# Patient Record
Sex: FEMALE | Race: WHITE | Employment: OTHER | URBAN - METROPOLITAN AREA
[De-identification: names, ages, dates, MRNs, and addresses within clinical notes are randomized per-mention and may not be internally consistent; named-entity substitution may affect disease eponyms.]

---

## 2017-01-19 ENCOUNTER — TELEPHONE (OUTPATIENT)
Dept: INTERNAL MEDICINE | Age: 67
End: 2017-01-19

## 2017-01-19 RX ORDER — AMOXICILLIN 500 MG/1
CAPSULE ORAL
Qty: 4 CAPSULE | Refills: 1 | Status: SHIPPED | OUTPATIENT
Start: 2017-01-19 | End: 2017-09-25

## 2017-01-25 RX ORDER — APIXABAN 5 MG/1
TABLET, FILM COATED ORAL
Qty: 74 TABLET | Refills: 0 | Status: SHIPPED | OUTPATIENT
Start: 2017-01-25 | End: 2017-02-25 | Stop reason: SDUPTHER

## 2017-02-20 ENCOUNTER — OFFICE VISIT (OUTPATIENT)
Dept: INTERNAL MEDICINE | Age: 67
End: 2017-02-20

## 2017-02-20 VITALS
DIASTOLIC BLOOD PRESSURE: 82 MMHG | BODY MASS INDEX: 36.41 KG/M2 | WEIGHT: 232 LBS | HEIGHT: 67 IN | SYSTOLIC BLOOD PRESSURE: 110 MMHG | HEART RATE: 84 BPM

## 2017-02-20 DIAGNOSIS — I82.4Z2 DEEP VEIN THROMBOSIS (DVT) OF DISTAL VEIN OF LEFT LOWER EXTREMITY, UNSPECIFIED CHRONICITY (HCC): Primary | ICD-10-CM

## 2017-02-20 DIAGNOSIS — E11.9 TYPE 2 DIABETES MELLITUS WITHOUT COMPLICATION, WITHOUT LONG-TERM CURRENT USE OF INSULIN (HCC): ICD-10-CM

## 2017-02-20 DIAGNOSIS — D64.9 ANEMIA, UNSPECIFIED TYPE: ICD-10-CM

## 2017-02-20 DIAGNOSIS — I10 ESSENTIAL HYPERTENSION: ICD-10-CM

## 2017-02-20 PROCEDURE — 3014F SCREEN MAMMO DOC REV: CPT | Performed by: INTERNAL MEDICINE

## 2017-02-20 PROCEDURE — G8400 PT W/DXA NO RESULTS DOC: HCPCS | Performed by: INTERNAL MEDICINE

## 2017-02-20 PROCEDURE — 3044F HG A1C LEVEL LT 7.0%: CPT | Performed by: INTERNAL MEDICINE

## 2017-02-20 PROCEDURE — 1123F ACP DISCUSS/DSCN MKR DOCD: CPT | Performed by: INTERNAL MEDICINE

## 2017-02-20 PROCEDURE — 1036F TOBACCO NON-USER: CPT | Performed by: INTERNAL MEDICINE

## 2017-02-20 PROCEDURE — 3017F COLORECTAL CA SCREEN DOC REV: CPT | Performed by: INTERNAL MEDICINE

## 2017-02-20 PROCEDURE — G8484 FLU IMMUNIZE NO ADMIN: HCPCS | Performed by: INTERNAL MEDICINE

## 2017-02-20 PROCEDURE — 1090F PRES/ABSN URINE INCON ASSESS: CPT | Performed by: INTERNAL MEDICINE

## 2017-02-20 PROCEDURE — 99214 OFFICE O/P EST MOD 30 MIN: CPT | Performed by: INTERNAL MEDICINE

## 2017-02-20 PROCEDURE — G8419 CALC BMI OUT NRM PARAM NOF/U: HCPCS | Performed by: INTERNAL MEDICINE

## 2017-02-20 PROCEDURE — G8427 DOCREV CUR MEDS BY ELIG CLIN: HCPCS | Performed by: INTERNAL MEDICINE

## 2017-02-20 PROCEDURE — 4040F PNEUMOC VAC/ADMIN/RCVD: CPT | Performed by: INTERNAL MEDICINE

## 2017-02-20 PROCEDURE — G8598 ASA/ANTIPLAT THER USED: HCPCS | Performed by: INTERNAL MEDICINE

## 2017-02-20 ASSESSMENT — PATIENT HEALTH QUESTIONNAIRE - PHQ9
SUM OF ALL RESPONSES TO PHQ9 QUESTIONS 1 & 2: 0
1. LITTLE INTEREST OR PLEASURE IN DOING THINGS: 0
2. FEELING DOWN, DEPRESSED OR HOPELESS: 0
SUM OF ALL RESPONSES TO PHQ QUESTIONS 1-9: 0

## 2017-02-20 ASSESSMENT — ENCOUNTER SYMPTOMS: RESPIRATORY NEGATIVE: 1

## 2017-02-24 ENCOUNTER — HOSPITAL ENCOUNTER (OUTPATIENT)
Dept: VASCULAR LAB | Age: 67
Discharge: HOME | End: 2017-02-24
Attending: INTERNAL MEDICINE | Admitting: INTERNAL MEDICINE

## 2017-02-24 DIAGNOSIS — I82.4Z2 ACUTE EMBOLISM AND THROMBOSIS OF DEEP VEIN OF LEFT DISTAL LOWER EXTREMITY (HCC): ICD-10-CM

## 2017-02-24 DIAGNOSIS — I82.4Z2 DEEP VEIN THROMBOSIS (DVT) OF DISTAL VEIN OF LEFT LOWER EXTREMITY, UNSPECIFIED CHRONICITY (HCC): Primary | ICD-10-CM

## 2017-02-27 RX ORDER — APIXABAN 5 MG/1
TABLET, FILM COATED ORAL
Qty: 60 TABLET | Refills: 1 | Status: SHIPPED | OUTPATIENT
Start: 2017-02-27 | End: 2017-09-25

## 2017-02-28 ENCOUNTER — TELEPHONE (OUTPATIENT)
Dept: INTERNAL MEDICINE | Age: 67
End: 2017-02-28

## 2017-03-08 RX ORDER — TRIAMTERENE AND HYDROCHLOROTHIAZIDE 75; 50 MG/1; MG/1
TABLET ORAL
Qty: 30 TABLET | Refills: 5 | Status: SHIPPED | OUTPATIENT
Start: 2017-03-08 | End: 2017-09-04 | Stop reason: SDUPTHER

## 2017-03-30 DIAGNOSIS — D64.9 ANEMIA, UNSPECIFIED TYPE: ICD-10-CM

## 2017-03-30 LAB
BASOPHILS ABSOLUTE: 0.1 K/UL (ref 0–0.2)
BASOPHILS RELATIVE PERCENT: 0.9 %
EOSINOPHILS ABSOLUTE: 0 K/UL (ref 0–0.6)
EOSINOPHILS RELATIVE PERCENT: 0.3 %
FERRITIN: 20.9 NG/ML (ref 15–150)
HCT VFR BLD CALC: 40.2 % (ref 36–48)
HEMOGLOBIN: 13 G/DL (ref 12–16)
IRON SATURATION: 64 % (ref 15–50)
IRON: 211 UG/DL (ref 37–145)
LYMPHOCYTES ABSOLUTE: 1.5 K/UL (ref 1–5.1)
LYMPHOCYTES RELATIVE PERCENT: 25.6 %
MCH RBC QN AUTO: 27.1 PG (ref 26–34)
MCHC RBC AUTO-ENTMCNC: 32.4 G/DL (ref 31–36)
MCV RBC AUTO: 83.6 FL (ref 80–100)
MONOCYTES ABSOLUTE: 0.5 K/UL (ref 0–1.3)
MONOCYTES RELATIVE PERCENT: 8.4 %
NEUTROPHILS ABSOLUTE: 3.8 K/UL (ref 1.7–7.7)
NEUTROPHILS RELATIVE PERCENT: 64.8 %
PDW BLD-RTO: 20.4 % (ref 12.4–15.4)
PLATELET # BLD: 326 K/UL (ref 135–450)
PMV BLD AUTO: 8.7 FL (ref 5–10.5)
RBC # BLD: 4.81 M/UL (ref 4–5.2)
TOTAL IRON BINDING CAPACITY: 332 UG/DL (ref 260–445)
WBC # BLD: 5.9 K/UL (ref 4–11)

## 2017-04-10 RX ORDER — VENLAFAXINE HYDROCHLORIDE 75 MG/1
CAPSULE, EXTENDED RELEASE ORAL
Qty: 30 CAPSULE | Refills: 5 | Status: SHIPPED | OUTPATIENT
Start: 2017-04-10 | End: 2017-10-11 | Stop reason: SDUPTHER

## 2017-06-12 RX ORDER — ROPINIROLE 1 MG/1
TABLET, FILM COATED ORAL
Qty: 30 TABLET | Refills: 5 | Status: SHIPPED | OUTPATIENT
Start: 2017-06-12 | End: 2017-12-16 | Stop reason: SDUPTHER

## 2017-09-05 RX ORDER — TRIAMTERENE AND HYDROCHLOROTHIAZIDE 75; 50 MG/1; MG/1
TABLET ORAL
Qty: 30 TABLET | Refills: 5 | Status: SHIPPED | OUTPATIENT
Start: 2017-09-05 | End: 2017-11-17

## 2017-09-14 RX ORDER — LEVOTHYROXINE SODIUM 0.1 MG/1
TABLET ORAL
Qty: 90 TABLET | Refills: 3 | Status: SHIPPED | OUTPATIENT
Start: 2017-09-14 | End: 2018-08-29 | Stop reason: SDUPTHER

## 2017-09-25 ENCOUNTER — OFFICE VISIT (OUTPATIENT)
Dept: INTERNAL MEDICINE | Age: 67
End: 2017-09-25

## 2017-09-25 VITALS
SYSTOLIC BLOOD PRESSURE: 118 MMHG | WEIGHT: 238 LBS | DIASTOLIC BLOOD PRESSURE: 80 MMHG | HEIGHT: 67 IN | BODY MASS INDEX: 37.35 KG/M2 | HEART RATE: 68 BPM

## 2017-09-25 DIAGNOSIS — E11.9 TYPE 2 DIABETES MELLITUS WITHOUT COMPLICATION, WITHOUT LONG-TERM CURRENT USE OF INSULIN (HCC): ICD-10-CM

## 2017-09-25 DIAGNOSIS — F32.A DEPRESSION, UNSPECIFIED DEPRESSION TYPE: ICD-10-CM

## 2017-09-25 DIAGNOSIS — I49.8 BRADYARRHYTHMIA: ICD-10-CM

## 2017-09-25 DIAGNOSIS — F32.4 DEPRESSION, MAJOR, SINGLE EPISODE, IN PARTIAL REMISSION (HCC): ICD-10-CM

## 2017-09-25 DIAGNOSIS — N28.9 RENAL INSUFFICIENCY: ICD-10-CM

## 2017-09-25 DIAGNOSIS — G47.33 OSA (OBSTRUCTIVE SLEEP APNEA): ICD-10-CM

## 2017-09-25 DIAGNOSIS — I25.10 CORONARY ARTERY DISEASE INVOLVING NATIVE CORONARY ARTERY OF NATIVE HEART WITHOUT ANGINA PECTORIS: ICD-10-CM

## 2017-09-25 DIAGNOSIS — I10 ESSENTIAL HYPERTENSION: ICD-10-CM

## 2017-09-25 DIAGNOSIS — E03.9 HYPOTHYROIDISM, UNSPECIFIED TYPE: ICD-10-CM

## 2017-09-25 DIAGNOSIS — R82.81 PYURIA: ICD-10-CM

## 2017-09-25 DIAGNOSIS — M17.11 ARTHRITIS OF KNEE, RIGHT: ICD-10-CM

## 2017-09-25 PROCEDURE — 4040F PNEUMOC VAC/ADMIN/RCVD: CPT | Performed by: INTERNAL MEDICINE

## 2017-09-25 PROCEDURE — 3014F SCREEN MAMMO DOC REV: CPT | Performed by: INTERNAL MEDICINE

## 2017-09-25 PROCEDURE — 99215 OFFICE O/P EST HI 40 MIN: CPT | Performed by: INTERNAL MEDICINE

## 2017-09-25 PROCEDURE — 3017F COLORECTAL CA SCREEN DOC REV: CPT | Performed by: INTERNAL MEDICINE

## 2017-09-25 PROCEDURE — G8419 CALC BMI OUT NRM PARAM NOF/U: HCPCS | Performed by: INTERNAL MEDICINE

## 2017-09-25 PROCEDURE — G8598 ASA/ANTIPLAT THER USED: HCPCS | Performed by: INTERNAL MEDICINE

## 2017-09-25 PROCEDURE — G8427 DOCREV CUR MEDS BY ELIG CLIN: HCPCS | Performed by: INTERNAL MEDICINE

## 2017-09-25 PROCEDURE — 1090F PRES/ABSN URINE INCON ASSESS: CPT | Performed by: INTERNAL MEDICINE

## 2017-09-25 PROCEDURE — 3046F HEMOGLOBIN A1C LEVEL >9.0%: CPT | Performed by: INTERNAL MEDICINE

## 2017-09-25 PROCEDURE — 1036F TOBACCO NON-USER: CPT | Performed by: INTERNAL MEDICINE

## 2017-09-25 PROCEDURE — 1123F ACP DISCUSS/DSCN MKR DOCD: CPT | Performed by: INTERNAL MEDICINE

## 2017-09-25 PROCEDURE — G8400 PT W/DXA NO RESULTS DOC: HCPCS | Performed by: INTERNAL MEDICINE

## 2017-09-25 RX ORDER — CEPHALEXIN 500 MG/1
500 CAPSULE ORAL 3 TIMES DAILY
Qty: 21 CAPSULE | Refills: 0 | Status: SHIPPED | OUTPATIENT
Start: 2017-09-25 | End: 2017-11-17

## 2017-09-25 ASSESSMENT — ENCOUNTER SYMPTOMS
APNEA: 1
TROUBLE SWALLOWING: 0
EYE DISCHARGE: 0
GASTROINTESTINAL NEGATIVE: 1

## 2017-10-11 RX ORDER — VENLAFAXINE HYDROCHLORIDE 75 MG/1
CAPSULE, EXTENDED RELEASE ORAL
Qty: 30 CAPSULE | Refills: 5 | Status: SHIPPED | OUTPATIENT
Start: 2017-10-11 | End: 2018-03-26 | Stop reason: SDUPTHER

## 2017-11-06 DIAGNOSIS — N28.9 RENAL INSUFFICIENCY: ICD-10-CM

## 2017-11-06 DIAGNOSIS — I10 ESSENTIAL HYPERTENSION: ICD-10-CM

## 2017-11-06 DIAGNOSIS — I49.8 BRADYARRHYTHMIA: ICD-10-CM

## 2017-11-06 LAB
A/G RATIO: 1.4 (ref 1.1–2.2)
ALBUMIN SERPL-MCNC: 4 G/DL (ref 3.4–5)
ALP BLD-CCNC: 98 U/L (ref 40–129)
ALT SERPL-CCNC: 9 U/L (ref 10–40)
ANION GAP SERPL CALCULATED.3IONS-SCNC: 16 MMOL/L (ref 3–16)
AST SERPL-CCNC: 14 U/L (ref 15–37)
BACTERIA: ABNORMAL /HPF
BASOPHILS ABSOLUTE: 0.1 K/UL (ref 0–0.2)
BASOPHILS RELATIVE PERCENT: 0.7 %
BILIRUB SERPL-MCNC: 0.4 MG/DL (ref 0–1)
BILIRUBIN URINE: ABNORMAL
BLOOD, URINE: NEGATIVE
BUN BLDV-MCNC: 25 MG/DL (ref 7–20)
CALCIUM SERPL-MCNC: 9.8 MG/DL (ref 8.3–10.6)
CHLORIDE BLD-SCNC: 96 MMOL/L (ref 99–110)
CLARITY: ABNORMAL
CO2: 26 MMOL/L (ref 21–32)
COLOR: ABNORMAL
CREAT SERPL-MCNC: 1.1 MG/DL (ref 0.6–1.2)
EOSINOPHILS ABSOLUTE: 0.1 K/UL (ref 0–0.6)
EOSINOPHILS RELATIVE PERCENT: 1.1 %
EPITHELIAL CELLS, UA: 7 /HPF (ref 0–5)
GFR AFRICAN AMERICAN: 60
GFR NON-AFRICAN AMERICAN: 50
GLOBULIN: 2.8 G/DL
GLUCOSE BLD-MCNC: 116 MG/DL (ref 70–99)
GLUCOSE URINE: NEGATIVE MG/DL
HCT VFR BLD CALC: 41.2 % (ref 36–48)
HEMOGLOBIN: 13.3 G/DL (ref 12–16)
HYALINE CASTS: 5 /LPF (ref 0–8)
KETONES, URINE: NEGATIVE MG/DL
LEUKOCYTE ESTERASE, URINE: ABNORMAL
LYMPHOCYTES ABSOLUTE: 1.3 K/UL (ref 1–5.1)
LYMPHOCYTES RELATIVE PERCENT: 14.5 %
MCH RBC QN AUTO: 28.8 PG (ref 26–34)
MCHC RBC AUTO-ENTMCNC: 32.3 G/DL (ref 31–36)
MCV RBC AUTO: 89.3 FL (ref 80–100)
MICROSCOPIC EXAMINATION: YES
MONOCYTES ABSOLUTE: 0.7 K/UL (ref 0–1.3)
MONOCYTES RELATIVE PERCENT: 7.5 %
NEUTROPHILS ABSOLUTE: 6.9 K/UL (ref 1.7–7.7)
NEUTROPHILS RELATIVE PERCENT: 76.2 %
NITRITE, URINE: NEGATIVE
PDW BLD-RTO: 15.6 % (ref 12.4–15.4)
PH UA: 7
PLATELET # BLD: 473 K/UL (ref 135–450)
PMV BLD AUTO: 8.4 FL (ref 5–10.5)
POTASSIUM SERPL-SCNC: 3.8 MMOL/L (ref 3.5–5.1)
PROTEIN UA: NEGATIVE MG/DL
RBC # BLD: 4.61 M/UL (ref 4–5.2)
RBC UA: ABNORMAL /HPF (ref 0–2)
SODIUM BLD-SCNC: 138 MMOL/L (ref 136–145)
SPECIFIC GRAVITY UA: 1.02
TOTAL PROTEIN: 6.8 G/DL (ref 6.4–8.2)
TSH REFLEX FT4: 3.38 UIU/ML (ref 0.27–4.2)
URINE REFLEX TO CULTURE: YES
URINE TYPE: ABNORMAL
UROBILINOGEN, URINE: 1 E.U./DL
WBC # BLD: 9.1 K/UL (ref 4–11)
WBC UA: 3 /HPF (ref 0–5)

## 2017-11-08 LAB — URINE CULTURE, ROUTINE: NORMAL

## 2017-11-17 ENCOUNTER — OFFICE VISIT (OUTPATIENT)
Dept: INTERNAL MEDICINE | Age: 67
End: 2017-11-17

## 2017-11-17 VITALS
SYSTOLIC BLOOD PRESSURE: 92 MMHG | DIASTOLIC BLOOD PRESSURE: 72 MMHG | HEART RATE: 68 BPM | HEIGHT: 67 IN | BODY MASS INDEX: 36.1 KG/M2 | WEIGHT: 230 LBS

## 2017-11-17 DIAGNOSIS — R82.81 PYURIA: ICD-10-CM

## 2017-11-17 DIAGNOSIS — E11.9 TYPE 2 DIABETES MELLITUS WITHOUT COMPLICATION, WITHOUT LONG-TERM CURRENT USE OF INSULIN (HCC): ICD-10-CM

## 2017-11-17 DIAGNOSIS — N28.9 RENAL INSUFFICIENCY: ICD-10-CM

## 2017-11-17 DIAGNOSIS — G47.33 OSA (OBSTRUCTIVE SLEEP APNEA): ICD-10-CM

## 2017-11-17 DIAGNOSIS — I95.1 ORTHOSTATIC HYPOTENSION: ICD-10-CM

## 2017-11-17 DIAGNOSIS — E03.9 HYPOTHYROIDISM, UNSPECIFIED TYPE: ICD-10-CM

## 2017-11-17 DIAGNOSIS — I25.10 CORONARY ARTERY DISEASE INVOLVING NATIVE CORONARY ARTERY OF NATIVE HEART WITHOUT ANGINA PECTORIS: ICD-10-CM

## 2017-11-17 DIAGNOSIS — I10 ESSENTIAL HYPERTENSION: ICD-10-CM

## 2017-11-17 DIAGNOSIS — D64.9 ANEMIA, UNSPECIFIED TYPE: ICD-10-CM

## 2017-11-17 DIAGNOSIS — M17.11 ARTHRITIS OF KNEE, RIGHT: ICD-10-CM

## 2017-11-17 PROCEDURE — 99215 OFFICE O/P EST HI 40 MIN: CPT | Performed by: INTERNAL MEDICINE

## 2017-11-17 PROCEDURE — G8400 PT W/DXA NO RESULTS DOC: HCPCS | Performed by: INTERNAL MEDICINE

## 2017-11-17 PROCEDURE — 3017F COLORECTAL CA SCREEN DOC REV: CPT | Performed by: INTERNAL MEDICINE

## 2017-11-17 PROCEDURE — G8427 DOCREV CUR MEDS BY ELIG CLIN: HCPCS | Performed by: INTERNAL MEDICINE

## 2017-11-17 PROCEDURE — 4040F PNEUMOC VAC/ADMIN/RCVD: CPT | Performed by: INTERNAL MEDICINE

## 2017-11-17 PROCEDURE — 1090F PRES/ABSN URINE INCON ASSESS: CPT | Performed by: INTERNAL MEDICINE

## 2017-11-17 PROCEDURE — G8598 ASA/ANTIPLAT THER USED: HCPCS | Performed by: INTERNAL MEDICINE

## 2017-11-17 PROCEDURE — G8484 FLU IMMUNIZE NO ADMIN: HCPCS | Performed by: INTERNAL MEDICINE

## 2017-11-17 PROCEDURE — 1036F TOBACCO NON-USER: CPT | Performed by: INTERNAL MEDICINE

## 2017-11-17 PROCEDURE — 1123F ACP DISCUSS/DSCN MKR DOCD: CPT | Performed by: INTERNAL MEDICINE

## 2017-11-17 PROCEDURE — 3014F SCREEN MAMMO DOC REV: CPT | Performed by: INTERNAL MEDICINE

## 2017-11-17 PROCEDURE — 3046F HEMOGLOBIN A1C LEVEL >9.0%: CPT | Performed by: INTERNAL MEDICINE

## 2017-11-17 PROCEDURE — G8417 CALC BMI ABV UP PARAM F/U: HCPCS | Performed by: INTERNAL MEDICINE

## 2017-11-17 ASSESSMENT — ENCOUNTER SYMPTOMS
TROUBLE SWALLOWING: 0
CHEST TIGHTNESS: 0
GASTROINTESTINAL NEGATIVE: 1
SHORTNESS OF BREATH: 0
ALLERGIC/IMMUNOLOGIC NEGATIVE: 1

## 2017-11-17 NOTE — ASSESSMENT & PLAN NOTE
Patient's daughter is here to help give history. Patient has lightheadedness with position change and with bending over. Has 36 mmHg drop today with orthostatic changes. We'll stop Maxide in follow-up in 2 months after labs.

## 2017-11-17 NOTE — PROGRESS NOTES
place, and time. She has normal strength. She displays no tremor. No cranial nerve deficit. Coordination and gait normal.   No focal signs   Skin: She is not diaphoretic. No pallor. Psychiatric: She has a normal mood and affect. Her behavior is normal. Judgment and thought content normal.       ASSESSMENT:       Encounter Diagnoses   Name Primary?  Hypothyroidism, unspecified type     Essential hypertension     Type 2 diabetes mellitus without complication, without long-term current use of insulin (HCC)     Orthostatic hypotension     Renal insufficiency     Coronary artery disease involving native coronary artery of native heart without angina pectoris     Arthritis of knee, right     Pyuria     Anemia, unspecified type     ADILENE (obstructive sleep apnea)        Orthostatic hypotension  Patient's daughter is here to help give history. Patient has lightheadedness with position change and with bending over. Has 36 mmHg drop today with orthostatic changes. We'll stop Maxide in follow-up in 2 months after labs. Renal insufficiency  Creatinine 1.1 with GFR 50. Stopping Maxide and recheck in 2 months. Encourage hydration. No NSAID use. Hypothyroidism  Recheck TFT with next labs    Hypertension  See orthostatic hypotension. Stopping Maxide. Type 2 diabetes mellitus without complication (HCC)  No Rx. Sugars have been okay. A1c with next labs. CAD (coronary artery disease)  Nonobstructive. No chest pain or other cardiac symptoms except orthostatic hypotension. Arthritis of knee, right  Status post total knee replacement approximate 6 weeks ago and she is doing great. Pyuria  Culture-negative. No treatment. Anemia  Hemoglobin 13.3 stable.     ADILENE (obstructive sleep apnea)  CPAP        PLAN:  See ASSESSMENT for evaluation & PLAN    Orders Placed This Encounter   Procedures    Comprehensive Metabolic Panel     Standing Status:   Future     Standing Expiration Date:   11/17/2018    TSH without Reflex     Standing Status:   Future     Standing Expiration Date:   11/17/2018    T4, Free     Standing Status:   Future     Standing Expiration Date:   11/17/2018    Hemoglobin A1C     Standing Status:   Future     Standing Expiration Date:   11/17/2018       PSH, PMH, SH and FH reviewed and noted. Recent and past labs, tests and consults also reviewed. Recent or new meds also reviewed. Lengthy and thorough review of patient's current problem and other potential comorbid issues affecting patient - discussion w/ patient and accompanying family members. Questions and concerns were addressed, as well as any potential treatment options.

## 2017-12-18 RX ORDER — ROPINIROLE 1 MG/1
TABLET, FILM COATED ORAL
Qty: 30 TABLET | Refills: 5 | Status: SHIPPED | OUTPATIENT
Start: 2017-12-18 | End: 2018-06-16 | Stop reason: SDUPTHER

## 2018-01-25 DIAGNOSIS — E11.9 TYPE 2 DIABETES MELLITUS WITHOUT COMPLICATION, WITHOUT LONG-TERM CURRENT USE OF INSULIN (HCC): ICD-10-CM

## 2018-01-25 DIAGNOSIS — E03.9 HYPOTHYROIDISM, UNSPECIFIED TYPE: ICD-10-CM

## 2018-01-25 DIAGNOSIS — I10 ESSENTIAL HYPERTENSION: ICD-10-CM

## 2018-01-25 LAB
A/G RATIO: 1.6 (ref 1.1–2.2)
ALBUMIN SERPL-MCNC: 3.9 G/DL (ref 3.4–5)
ALP BLD-CCNC: 81 U/L (ref 40–129)
ALT SERPL-CCNC: 8 U/L (ref 10–40)
ANION GAP SERPL CALCULATED.3IONS-SCNC: 13 MMOL/L (ref 3–16)
AST SERPL-CCNC: 13 U/L (ref 15–37)
BILIRUB SERPL-MCNC: 0.4 MG/DL (ref 0–1)
BUN BLDV-MCNC: 15 MG/DL (ref 7–20)
CALCIUM SERPL-MCNC: 9.5 MG/DL (ref 8.3–10.6)
CHLORIDE BLD-SCNC: 99 MMOL/L (ref 99–110)
CO2: 28 MMOL/L (ref 21–32)
CREAT SERPL-MCNC: 0.9 MG/DL (ref 0.6–1.2)
GFR AFRICAN AMERICAN: >60
GFR NON-AFRICAN AMERICAN: >60
GLOBULIN: 2.4 G/DL
GLUCOSE BLD-MCNC: 113 MG/DL (ref 70–99)
POTASSIUM SERPL-SCNC: 4.3 MMOL/L (ref 3.5–5.1)
SODIUM BLD-SCNC: 140 MMOL/L (ref 136–145)
T4 FREE: 1.6 NG/DL (ref 0.9–1.8)
TOTAL PROTEIN: 6.3 G/DL (ref 6.4–8.2)
TSH SERPL DL<=0.05 MIU/L-ACNC: 2.1 UIU/ML (ref 0.27–4.2)

## 2018-01-26 LAB
ESTIMATED AVERAGE GLUCOSE: 131.2 MG/DL
HBA1C MFR BLD: 6.2 %

## 2018-01-29 ENCOUNTER — OFFICE VISIT (OUTPATIENT)
Dept: INTERNAL MEDICINE | Age: 68
End: 2018-01-29

## 2018-01-29 VITALS
OXYGEN SATURATION: 98 % | HEIGHT: 67 IN | SYSTOLIC BLOOD PRESSURE: 138 MMHG | DIASTOLIC BLOOD PRESSURE: 88 MMHG | BODY MASS INDEX: 38.3 KG/M2 | WEIGHT: 244 LBS | HEART RATE: 68 BPM

## 2018-01-29 DIAGNOSIS — G47.33 OSA (OBSTRUCTIVE SLEEP APNEA): ICD-10-CM

## 2018-01-29 DIAGNOSIS — F32.A DEPRESSION, UNSPECIFIED DEPRESSION TYPE: ICD-10-CM

## 2018-01-29 DIAGNOSIS — I95.1 ORTHOSTATIC HYPOTENSION: ICD-10-CM

## 2018-01-29 DIAGNOSIS — N28.9 RENAL INSUFFICIENCY: ICD-10-CM

## 2018-01-29 DIAGNOSIS — I10 ESSENTIAL HYPERTENSION: ICD-10-CM

## 2018-01-29 DIAGNOSIS — E11.9 TYPE 2 DIABETES MELLITUS WITHOUT COMPLICATION, WITHOUT LONG-TERM CURRENT USE OF INSULIN (HCC): ICD-10-CM

## 2018-01-29 DIAGNOSIS — E03.9 HYPOTHYROIDISM, UNSPECIFIED TYPE: ICD-10-CM

## 2018-01-29 DIAGNOSIS — G25.81 RESTLESS LEG SYNDROME: ICD-10-CM

## 2018-01-29 DIAGNOSIS — F32.5 MAJOR DEPRESSION, SINGLE EPISODE, IN COMPLETE REMISSION (HCC): ICD-10-CM

## 2018-01-29 DIAGNOSIS — I25.10 CORONARY ARTERY DISEASE INVOLVING NATIVE CORONARY ARTERY OF NATIVE HEART WITHOUT ANGINA PECTORIS: ICD-10-CM

## 2018-01-29 DIAGNOSIS — Z00.00 PREVENTATIVE HEALTH CARE: ICD-10-CM

## 2018-01-29 DIAGNOSIS — E04.1 THYROID NODULE: ICD-10-CM

## 2018-01-29 PROBLEM — M17.11 ARTHRITIS OF KNEE, RIGHT: Status: RESOLVED | Noted: 2017-09-25 | Resolved: 2018-01-29

## 2018-01-29 PROCEDURE — G8599 NO ASA/ANTIPLAT THER USE RNG: HCPCS | Performed by: INTERNAL MEDICINE

## 2018-01-29 PROCEDURE — 3044F HG A1C LEVEL LT 7.0%: CPT | Performed by: INTERNAL MEDICINE

## 2018-01-29 PROCEDURE — G0439 PPPS, SUBSEQ VISIT: HCPCS | Performed by: INTERNAL MEDICINE

## 2018-01-29 ASSESSMENT — ENCOUNTER SYMPTOMS
ALLERGIC/IMMUNOLOGIC NEGATIVE: 1
GASTROINTESTINAL NEGATIVE: 1
TROUBLE SWALLOWING: 0
RESPIRATORY NEGATIVE: 1

## 2018-01-29 NOTE — PROGRESS NOTES
complication (HCC)  P0N stable 6.2. No Rx. Thyroid nodule  Possible small nodule on the right. Get ultrasound. Restless leg syndrome  Controlled    Renal insufficiency  GFR now greater than 60. Off diuretic. Orthostatic hypotension  Resolved off hydrochlorothiazide. Hypertension  Off hydrochlorothiazide, she feels better and no orthostatic symptoms. BP slightly elevated todayshe is having her dog euthanized today. She will monitor BP follow-up in 6 months. Depression  Clinically doing wellcontinue Effexor. CAD (coronary artery disease)  No angina symptoms. ADILENE (obstructive sleep apnea)  CPAP        PLAN:  See ASSESSMENT for evaluation & PLAN    Orders Placed This Encounter   Procedures    US Thyroid     Standing Status:   Future     Standing Expiration Date:   1/29/2019     Order Specific Question:   Reason for exam:     Answer:   possible small right nodule noted on exam    Comprehensive Metabolic Panel     Standing Status:   Future     Standing Expiration Date:   1/29/2019    Lipid Panel     Standing Status:   Future     Standing Expiration Date:   1/29/2019     Order Specific Question:   Is Patient Fasting?/# of Hours     Answer:   yes - 8 hours    Hemoglobin A1C     Standing Status:   Future     Standing Expiration Date:   1/29/2019    TSH without Reflex     Standing Status:   Future     Standing Expiration Date:   1/29/2019    T4, Free     Standing Status:   Future     Standing Expiration Date:   1/29/2019       PSH, PMH, SH and FH reviewed and noted. Recent and past labs, tests and consults also reviewed. Recent or new meds also reviewed.

## 2018-02-05 ENCOUNTER — HOSPITAL ENCOUNTER (OUTPATIENT)
Dept: ULTRASOUND IMAGING | Age: 68
Discharge: HOME | End: 2018-02-05
Attending: INTERNAL MEDICINE | Admitting: INTERNAL MEDICINE

## 2018-02-05 DIAGNOSIS — E04.1 NONTOXIC SINGLE THYROID NODULE: ICD-10-CM

## 2018-02-05 DIAGNOSIS — E04.1 THYROID NODULE: ICD-10-CM

## 2018-03-26 RX ORDER — VENLAFAXINE HYDROCHLORIDE 75 MG/1
CAPSULE, EXTENDED RELEASE ORAL
Qty: 30 CAPSULE | Refills: 5 | Status: SHIPPED | OUTPATIENT
Start: 2018-03-26 | End: 2018-09-15 | Stop reason: SDUPTHER

## 2018-04-11 PROBLEM — Z00.00 PREVENTATIVE HEALTH CARE: Status: RESOLVED | Noted: 2018-01-29 | Resolved: 2018-04-11

## 2018-06-18 RX ORDER — ROPINIROLE 1 MG/1
TABLET, FILM COATED ORAL
Qty: 30 TABLET | Refills: 5 | Status: SHIPPED | OUTPATIENT
Start: 2018-06-18 | End: 2018-12-15 | Stop reason: SDUPTHER

## 2018-07-31 ENCOUNTER — TELEPHONE (OUTPATIENT)
Dept: INTERNAL MEDICINE | Age: 68
End: 2018-07-31

## 2018-08-29 RX ORDER — LEVOTHYROXINE SODIUM 0.1 MG/1
TABLET ORAL
Qty: 90 TABLET | Refills: 3 | Status: SHIPPED | OUTPATIENT
Start: 2018-08-29 | End: 2019-08-12 | Stop reason: SDUPTHER

## 2018-09-14 DIAGNOSIS — E03.9 HYPOTHYROIDISM, UNSPECIFIED TYPE: ICD-10-CM

## 2018-09-14 DIAGNOSIS — E11.9 TYPE 2 DIABETES MELLITUS WITHOUT COMPLICATION, WITHOUT LONG-TERM CURRENT USE OF INSULIN (HCC): ICD-10-CM

## 2018-09-14 DIAGNOSIS — I10 ESSENTIAL HYPERTENSION: ICD-10-CM

## 2018-09-14 LAB
A/G RATIO: 1.5 (ref 1.1–2.2)
ALBUMIN SERPL-MCNC: 4.1 G/DL (ref 3.4–5)
ALP BLD-CCNC: 92 U/L (ref 40–129)
ALT SERPL-CCNC: 15 U/L (ref 10–40)
ANION GAP SERPL CALCULATED.3IONS-SCNC: 16 MMOL/L (ref 3–16)
AST SERPL-CCNC: 20 U/L (ref 15–37)
BILIRUB SERPL-MCNC: 0.4 MG/DL (ref 0–1)
BUN BLDV-MCNC: 23 MG/DL (ref 7–20)
CALCIUM SERPL-MCNC: 9.7 MG/DL (ref 8.3–10.6)
CHLORIDE BLD-SCNC: 95 MMOL/L (ref 99–110)
CHOLESTEROL, TOTAL: 246 MG/DL (ref 0–199)
CO2: 29 MMOL/L (ref 21–32)
CREAT SERPL-MCNC: 1 MG/DL (ref 0.6–1.2)
GFR AFRICAN AMERICAN: >60
GFR NON-AFRICAN AMERICAN: 55
GLOBULIN: 2.7 G/DL
GLUCOSE BLD-MCNC: 137 MG/DL (ref 70–99)
HDLC SERPL-MCNC: 44 MG/DL (ref 40–60)
LDL CHOLESTEROL CALCULATED: 144 MG/DL
POTASSIUM SERPL-SCNC: 4.1 MMOL/L (ref 3.5–5.1)
SODIUM BLD-SCNC: 140 MMOL/L (ref 136–145)
T4 FREE: 1.8 NG/DL (ref 0.9–1.8)
TOTAL PROTEIN: 6.8 G/DL (ref 6.4–8.2)
TRIGL SERPL-MCNC: 292 MG/DL (ref 0–150)
TSH SERPL DL<=0.05 MIU/L-ACNC: 0.84 UIU/ML (ref 0.27–4.2)
VLDLC SERPL CALC-MCNC: 58 MG/DL

## 2018-09-15 LAB
ESTIMATED AVERAGE GLUCOSE: 157.1 MG/DL
HBA1C MFR BLD: 7.1 %

## 2018-09-15 RX ORDER — VENLAFAXINE HYDROCHLORIDE 75 MG/1
CAPSULE, EXTENDED RELEASE ORAL
Qty: 30 CAPSULE | Refills: 5 | Status: SHIPPED | OUTPATIENT
Start: 2018-09-15 | End: 2019-08-16

## 2018-10-22 ENCOUNTER — TELEPHONE (OUTPATIENT)
Dept: INTERNAL MEDICINE CLINIC | Age: 68
End: 2018-10-22

## 2018-10-22 RX ORDER — ALBUTEROL SULFATE 90 UG/1
2 AEROSOL, METERED RESPIRATORY (INHALATION) EVERY 4 HOURS PRN
Qty: 1 INHALER | Refills: 0 | Status: SHIPPED | OUTPATIENT
Start: 2018-10-22 | End: 2019-09-17

## 2018-10-22 NOTE — TELEPHONE ENCOUNTER
Patient has appt 10/26 but has URI with iwheezing, coughing. Patient has a hx of asthma and requesting to be put on Dr. Sheri Orta schedule before then. Patient doesn't have an inhaler so she isn't sleeping due to coughing.

## 2018-10-26 ENCOUNTER — OFFICE VISIT (OUTPATIENT)
Dept: INTERNAL MEDICINE CLINIC | Age: 68
End: 2018-10-26
Payer: MEDICARE

## 2018-10-26 VITALS
DIASTOLIC BLOOD PRESSURE: 68 MMHG | SYSTOLIC BLOOD PRESSURE: 128 MMHG | HEIGHT: 67 IN | BODY MASS INDEX: 41.12 KG/M2 | WEIGHT: 262 LBS

## 2018-10-26 DIAGNOSIS — E11.9 TYPE 2 DIABETES MELLITUS WITHOUT COMPLICATION, WITHOUT LONG-TERM CURRENT USE OF INSULIN (HCC): ICD-10-CM

## 2018-10-26 DIAGNOSIS — E66.01 MORBID OBESITY WITH BMI OF 40.0-44.9, ADULT (HCC): ICD-10-CM

## 2018-10-26 DIAGNOSIS — E04.1 THYROID NODULE: ICD-10-CM

## 2018-10-26 DIAGNOSIS — I10 ESSENTIAL HYPERTENSION: ICD-10-CM

## 2018-10-26 DIAGNOSIS — E03.9 HYPOTHYROIDISM, UNSPECIFIED TYPE: ICD-10-CM

## 2018-10-26 DIAGNOSIS — E78.2 HYPERLIPIDEMIA, MIXED: ICD-10-CM

## 2018-10-26 DIAGNOSIS — D64.9 ANEMIA, UNSPECIFIED TYPE: Primary | ICD-10-CM

## 2018-10-26 DIAGNOSIS — N28.9 RENAL INSUFFICIENCY: ICD-10-CM

## 2018-10-26 DIAGNOSIS — J45.909 ACUTE ASTHMATIC BRONCHITIS: ICD-10-CM

## 2018-10-26 PROBLEM — I95.1 ORTHOSTATIC HYPOTENSION: Status: RESOLVED | Noted: 2017-11-17 | Resolved: 2018-10-26

## 2018-10-26 PROCEDURE — 1090F PRES/ABSN URINE INCON ASSESS: CPT | Performed by: INTERNAL MEDICINE

## 2018-10-26 PROCEDURE — G8599 NO ASA/ANTIPLAT THER USE RNG: HCPCS | Performed by: INTERNAL MEDICINE

## 2018-10-26 PROCEDURE — 3017F COLORECTAL CA SCREEN DOC REV: CPT | Performed by: INTERNAL MEDICINE

## 2018-10-26 PROCEDURE — G8417 CALC BMI ABV UP PARAM F/U: HCPCS | Performed by: INTERNAL MEDICINE

## 2018-10-26 PROCEDURE — G8400 PT W/DXA NO RESULTS DOC: HCPCS | Performed by: INTERNAL MEDICINE

## 2018-10-26 PROCEDURE — 1036F TOBACCO NON-USER: CPT | Performed by: INTERNAL MEDICINE

## 2018-10-26 PROCEDURE — 3288F FALL RISK ASSESSMENT DOCD: CPT | Performed by: INTERNAL MEDICINE

## 2018-10-26 PROCEDURE — 1101F PT FALLS ASSESS-DOCD LE1/YR: CPT | Performed by: INTERNAL MEDICINE

## 2018-10-26 PROCEDURE — G8484 FLU IMMUNIZE NO ADMIN: HCPCS | Performed by: INTERNAL MEDICINE

## 2018-10-26 PROCEDURE — 2022F DILAT RTA XM EVC RTNOPTHY: CPT | Performed by: INTERNAL MEDICINE

## 2018-10-26 PROCEDURE — 1123F ACP DISCUSS/DSCN MKR DOCD: CPT | Performed by: INTERNAL MEDICINE

## 2018-10-26 PROCEDURE — 3045F PR MOST RECENT HEMOGLOBIN A1C LEVEL 7.0-9.0%: CPT | Performed by: INTERNAL MEDICINE

## 2018-10-26 PROCEDURE — G8427 DOCREV CUR MEDS BY ELIG CLIN: HCPCS | Performed by: INTERNAL MEDICINE

## 2018-10-26 PROCEDURE — 4040F PNEUMOC VAC/ADMIN/RCVD: CPT | Performed by: INTERNAL MEDICINE

## 2018-10-26 PROCEDURE — 99214 OFFICE O/P EST MOD 30 MIN: CPT | Performed by: INTERNAL MEDICINE

## 2018-10-26 RX ORDER — ATORVASTATIN CALCIUM 10 MG/1
10 TABLET, FILM COATED ORAL DAILY
Qty: 90 TABLET | Refills: 3 | Status: SHIPPED | OUTPATIENT
Start: 2018-10-26 | End: 2019-05-24 | Stop reason: SDUPTHER

## 2018-10-26 RX ORDER — AZITHROMYCIN 500 MG/1
TABLET, FILM COATED ORAL
Qty: 3 TABLET | Refills: 0 | Status: SHIPPED | OUTPATIENT
Start: 2018-10-26 | End: 2018-11-05

## 2018-10-26 RX ORDER — METFORMIN HYDROCHLORIDE 500 MG/1
500 TABLET, EXTENDED RELEASE ORAL DAILY
Qty: 90 TABLET | Refills: 3 | Status: SHIPPED | OUTPATIENT
Start: 2018-10-26 | End: 2019-05-24 | Stop reason: SDUPTHER

## 2018-10-26 ASSESSMENT — ENCOUNTER SYMPTOMS
RHINORRHEA: 1
BACK PAIN: 1
WHEEZING: 1
SORE THROAT: 0
SHORTNESS OF BREATH: 0
COUGH: 1
GASTROINTESTINAL NEGATIVE: 1

## 2018-10-26 NOTE — PROGRESS NOTES
SUBJECTIVE:  Patient ID: Karla Zamora is an 79 y.o. female. HPI: Patient here today for the f/u of chronic problems-- see Problem List and associated comments. New issues or complaints include (alsosee Assessment for more details): The labs reviewed. Patient's sugar and weight are both increased. Her cholesterols gone up as well. We discussed her diet and she does have a poor diet with usually prepared dinners. She is also having approximately 1-2 weeks of respiratory symptoms with cough and wheezing. Her sister is also sick with the same thing. Denies any cardiovascular symptoms. Nonsmoker. Review of Systems   Constitutional: Positive for unexpected weight change. Negative for chills and fever. HENT: Positive for rhinorrhea. Negative for sore throat. Respiratory: Positive for cough and wheezing. Negative for shortness of breath. Cardiovascular: Negative for chest pain. Gastrointestinal: Negative. Genitourinary: Negative. Musculoskeletal: Positive for arthralgias and back pain. Neurological: Negative. Psychiatric/Behavioral: Negative. OBJECTIVE:    /68 (Site: Right Upper Arm)   Ht 5' 7\" (1.702 m)   Wt 262 lb (118.8 kg)   BMI 41.04 kg/m²      Physical Exam   Constitutional: She is oriented to person, place, and time. She appears well-developed and well-nourished. No distress. Overweight   HENT:   Head: Normocephalic and atraumatic. Mouth/Throat: No oropharyngeal exudate. Eyes: Right eye exhibits no discharge. Left eye exhibits no discharge. No scleral icterus. Neck: No thyromegaly present. Cardiovascular: Normal rate, regular rhythm and normal heart sounds. Pulmonary/Chest: Effort normal. No stridor. No respiratory distress. She has wheezes. She has no rales. Semi-productive cough   Abdominal: Soft. Bowel sounds are normal.   Musculoskeletal: She exhibits no edema. Lymphadenopathy:     She has no cervical adenopathy.    Neurological: She is

## 2018-10-29 PROBLEM — M47.817 LUMBOSACRAL SPONDYLOSIS WITHOUT MYELOPATHY: Chronic | Status: ACTIVE | Noted: 2018-10-29

## 2018-10-29 PROBLEM — M51.379 DEGENERATION OF LUMBAR OR LUMBOSACRAL INTERVERTEBRAL DISC: Status: ACTIVE | Noted: 2018-10-29

## 2018-10-29 PROBLEM — M51.37 DEGENERATION OF LUMBAR OR LUMBOSACRAL INTERVERTEBRAL DISC: Chronic | Status: ACTIVE | Noted: 2018-10-29

## 2018-10-29 PROBLEM — M51.26 DISPLACEMENT OF LUMBAR INTERVERTEBRAL DISC WITHOUT MYELOPATHY: Chronic | Status: ACTIVE | Noted: 2018-10-29

## 2018-10-29 PROBLEM — M51.37 DEGENERATION OF LUMBAR OR LUMBOSACRAL INTERVERTEBRAL DISC: Status: ACTIVE | Noted: 2018-10-29

## 2018-10-29 PROBLEM — M51.26 DISPLACEMENT OF LUMBAR INTERVERTEBRAL DISC WITHOUT MYELOPATHY: Status: ACTIVE | Noted: 2018-10-29

## 2018-10-29 PROBLEM — M48.061 SPINAL STENOSIS, LUMBAR REGION, WITHOUT NEUROGENIC CLAUDICATION: Status: ACTIVE | Noted: 2018-10-29

## 2018-10-29 PROBLEM — M51.379 DEGENERATION OF LUMBAR OR LUMBOSACRAL INTERVERTEBRAL DISC: Chronic | Status: ACTIVE | Noted: 2018-10-29

## 2018-10-29 PROBLEM — M47.817 LUMBOSACRAL SPONDYLOSIS WITHOUT MYELOPATHY: Status: ACTIVE | Noted: 2018-10-29

## 2018-10-29 PROBLEM — M48.061 SPINAL STENOSIS, LUMBAR REGION, WITHOUT NEUROGENIC CLAUDICATION: Chronic | Status: ACTIVE | Noted: 2018-10-29

## 2018-11-09 ENCOUNTER — OFFICE VISIT (OUTPATIENT)
Dept: INTERNAL MEDICINE CLINIC | Age: 68
End: 2018-11-09
Payer: MEDICARE

## 2018-11-09 VITALS
HEIGHT: 67 IN | DIASTOLIC BLOOD PRESSURE: 84 MMHG | TEMPERATURE: 97.8 F | OXYGEN SATURATION: 98 % | WEIGHT: 263.8 LBS | RESPIRATION RATE: 16 BRPM | SYSTOLIC BLOOD PRESSURE: 130 MMHG | HEART RATE: 81 BPM | BODY MASS INDEX: 41.4 KG/M2

## 2018-11-09 DIAGNOSIS — J45.909 ACUTE ASTHMATIC BRONCHITIS: Primary | ICD-10-CM

## 2018-11-09 PROCEDURE — G8400 PT W/DXA NO RESULTS DOC: HCPCS | Performed by: INTERNAL MEDICINE

## 2018-11-09 PROCEDURE — 1101F PT FALLS ASSESS-DOCD LE1/YR: CPT | Performed by: INTERNAL MEDICINE

## 2018-11-09 PROCEDURE — 1123F ACP DISCUSS/DSCN MKR DOCD: CPT | Performed by: INTERNAL MEDICINE

## 2018-11-09 PROCEDURE — G8427 DOCREV CUR MEDS BY ELIG CLIN: HCPCS | Performed by: INTERNAL MEDICINE

## 2018-11-09 PROCEDURE — G8484 FLU IMMUNIZE NO ADMIN: HCPCS | Performed by: INTERNAL MEDICINE

## 2018-11-09 PROCEDURE — 99213 OFFICE O/P EST LOW 20 MIN: CPT | Performed by: INTERNAL MEDICINE

## 2018-11-09 PROCEDURE — 4040F PNEUMOC VAC/ADMIN/RCVD: CPT | Performed by: INTERNAL MEDICINE

## 2018-11-09 PROCEDURE — G8417 CALC BMI ABV UP PARAM F/U: HCPCS | Performed by: INTERNAL MEDICINE

## 2018-11-09 PROCEDURE — 1090F PRES/ABSN URINE INCON ASSESS: CPT | Performed by: INTERNAL MEDICINE

## 2018-11-09 PROCEDURE — 3017F COLORECTAL CA SCREEN DOC REV: CPT | Performed by: INTERNAL MEDICINE

## 2018-11-09 PROCEDURE — 1036F TOBACCO NON-USER: CPT | Performed by: INTERNAL MEDICINE

## 2018-11-09 PROCEDURE — G8599 NO ASA/ANTIPLAT THER USE RNG: HCPCS | Performed by: INTERNAL MEDICINE

## 2018-11-09 RX ORDER — METHYLPREDNISOLONE 4 MG/1
TABLET ORAL
Qty: 1 KIT | Refills: 1 | Status: SHIPPED | OUTPATIENT
Start: 2018-11-09 | End: 2018-11-15

## 2018-11-09 ASSESSMENT — PATIENT HEALTH QUESTIONNAIRE - PHQ9
SUM OF ALL RESPONSES TO PHQ QUESTIONS 1-9: 0
SUM OF ALL RESPONSES TO PHQ QUESTIONS 1-9: 0
2. FEELING DOWN, DEPRESSED OR HOPELESS: 0
SUM OF ALL RESPONSES TO PHQ9 QUESTIONS 1 & 2: 0
1. LITTLE INTEREST OR PLEASURE IN DOING THINGS: 0

## 2018-11-09 ASSESSMENT — ENCOUNTER SYMPTOMS
ABDOMINAL PAIN: 0
WHEEZING: 1
BACK PAIN: 0
NAUSEA: 0
EYE REDNESS: 0
SHORTNESS OF BREATH: 0
CHEST TIGHTNESS: 0
COUGH: 1

## 2018-12-17 RX ORDER — ROPINIROLE 1 MG/1
TABLET, FILM COATED ORAL
Qty: 30 TABLET | Refills: 5 | Status: SHIPPED | OUTPATIENT
Start: 2018-12-17 | End: 2019-06-12 | Stop reason: SDUPTHER

## 2019-01-28 PROBLEM — M51.26 DISC DISPLACEMENT, LUMBAR: Status: ACTIVE | Noted: 2019-01-28

## 2019-01-28 PROBLEM — M51.369 LUMBAR DEGENERATIVE DISC DISEASE: Status: ACTIVE | Noted: 2019-01-28

## 2019-01-28 PROBLEM — M51.369 LUMBAR DEGENERATIVE DISC DISEASE: Chronic | Status: ACTIVE | Noted: 2019-01-28

## 2019-01-28 PROBLEM — M48.061 SPINAL STENOSIS OF LUMBAR REGION: Status: ACTIVE | Noted: 2019-01-28

## 2019-01-28 PROBLEM — M51.26 DISC DISPLACEMENT, LUMBAR: Chronic | Status: ACTIVE | Noted: 2019-01-28

## 2019-01-28 PROBLEM — M51.36 LUMBAR DEGENERATIVE DISC DISEASE: Status: ACTIVE | Noted: 2019-01-28

## 2019-01-28 PROBLEM — M51.36 LUMBAR DEGENERATIVE DISC DISEASE: Chronic | Status: ACTIVE | Noted: 2019-01-28

## 2019-01-28 PROBLEM — M48.061 SPINAL STENOSIS OF LUMBAR REGION: Chronic | Status: ACTIVE | Noted: 2019-01-28

## 2019-03-03 DIAGNOSIS — J45.909 ACUTE ASTHMATIC BRONCHITIS: ICD-10-CM

## 2019-05-20 DIAGNOSIS — E11.9 TYPE 2 DIABETES MELLITUS WITHOUT COMPLICATION, WITHOUT LONG-TERM CURRENT USE OF INSULIN (HCC): ICD-10-CM

## 2019-05-20 DIAGNOSIS — I10 ESSENTIAL HYPERTENSION: ICD-10-CM

## 2019-05-20 DIAGNOSIS — D64.9 ANEMIA, UNSPECIFIED TYPE: ICD-10-CM

## 2019-05-20 DIAGNOSIS — E03.9 HYPOTHYROIDISM, UNSPECIFIED TYPE: ICD-10-CM

## 2019-05-20 LAB
A/G RATIO: 1.5 (ref 1.1–2.2)
ALBUMIN SERPL-MCNC: 4 G/DL (ref 3.4–5)
ALP BLD-CCNC: 89 U/L (ref 40–129)
ALT SERPL-CCNC: 12 U/L (ref 10–40)
ANION GAP SERPL CALCULATED.3IONS-SCNC: 17 MMOL/L (ref 3–16)
AST SERPL-CCNC: 14 U/L (ref 15–37)
BASOPHILS ABSOLUTE: 0 K/UL (ref 0–0.2)
BASOPHILS RELATIVE PERCENT: 0.5 %
BILIRUB SERPL-MCNC: 0.3 MG/DL (ref 0–1)
BUN BLDV-MCNC: 20 MG/DL (ref 7–20)
CALCIUM SERPL-MCNC: 9.9 MG/DL (ref 8.3–10.6)
CHLORIDE BLD-SCNC: 105 MMOL/L (ref 99–110)
CHOLESTEROL, TOTAL: 153 MG/DL (ref 0–199)
CO2: 23 MMOL/L (ref 21–32)
CREAT SERPL-MCNC: 0.9 MG/DL (ref 0.6–1.2)
EOSINOPHILS ABSOLUTE: 0.2 K/UL (ref 0–0.6)
EOSINOPHILS RELATIVE PERCENT: 2.2 %
GFR AFRICAN AMERICAN: >60
GFR NON-AFRICAN AMERICAN: >60
GLOBULIN: 2.6 G/DL
GLUCOSE BLD-MCNC: 131 MG/DL (ref 70–99)
HCT VFR BLD CALC: 37.8 % (ref 36–48)
HDLC SERPL-MCNC: 47 MG/DL (ref 40–60)
HEMOGLOBIN: 12.3 G/DL (ref 12–16)
LDL CHOLESTEROL CALCULATED: 65 MG/DL
LYMPHOCYTES ABSOLUTE: 1.7 K/UL (ref 1–5.1)
LYMPHOCYTES RELATIVE PERCENT: 21.8 %
MCH RBC QN AUTO: 26 PG (ref 26–34)
MCHC RBC AUTO-ENTMCNC: 32.5 G/DL (ref 31–36)
MCV RBC AUTO: 79.9 FL (ref 80–100)
MONOCYTES ABSOLUTE: 0.6 K/UL (ref 0–1.3)
MONOCYTES RELATIVE PERCENT: 7.7 %
NEUTROPHILS ABSOLUTE: 5.2 K/UL (ref 1.7–7.7)
NEUTROPHILS RELATIVE PERCENT: 67.8 %
PDW BLD-RTO: 18 % (ref 12.4–15.4)
PLATELET # BLD: 347 K/UL (ref 135–450)
PMV BLD AUTO: 8.6 FL (ref 5–10.5)
POTASSIUM SERPL-SCNC: 4.6 MMOL/L (ref 3.5–5.1)
RBC # BLD: 4.72 M/UL (ref 4–5.2)
SODIUM BLD-SCNC: 145 MMOL/L (ref 136–145)
T4 FREE: 1.6 NG/DL (ref 0.9–1.8)
TOTAL PROTEIN: 6.6 G/DL (ref 6.4–8.2)
TRIGL SERPL-MCNC: 207 MG/DL (ref 0–150)
TSH SERPL DL<=0.05 MIU/L-ACNC: 1.72 UIU/ML (ref 0.27–4.2)
VLDLC SERPL CALC-MCNC: 41 MG/DL
WBC # BLD: 7.7 K/UL (ref 4–11)

## 2019-05-21 LAB
ESTIMATED AVERAGE GLUCOSE: 148.5 MG/DL
HBA1C MFR BLD: 6.8 %

## 2019-05-24 ENCOUNTER — OFFICE VISIT (OUTPATIENT)
Dept: INTERNAL MEDICINE CLINIC | Age: 69
End: 2019-05-24
Payer: MEDICARE

## 2019-05-24 VITALS
HEIGHT: 67 IN | BODY MASS INDEX: 40.18 KG/M2 | OXYGEN SATURATION: 98 % | HEART RATE: 66 BPM | SYSTOLIC BLOOD PRESSURE: 128 MMHG | WEIGHT: 256 LBS | DIASTOLIC BLOOD PRESSURE: 80 MMHG

## 2019-05-24 DIAGNOSIS — F32.4 MAJOR DEPRESSIVE DISORDER, SINGLE EPISODE, IN PARTIAL REMISSION (HCC): ICD-10-CM

## 2019-05-24 DIAGNOSIS — E11.9 TYPE 2 DIABETES MELLITUS WITHOUT COMPLICATION, WITHOUT LONG-TERM CURRENT USE OF INSULIN (HCC): ICD-10-CM

## 2019-05-24 DIAGNOSIS — I10 ESSENTIAL HYPERTENSION: ICD-10-CM

## 2019-05-24 DIAGNOSIS — E78.2 HYPERLIPIDEMIA, MIXED: ICD-10-CM

## 2019-05-24 DIAGNOSIS — N28.9 RENAL INSUFFICIENCY: ICD-10-CM

## 2019-05-24 DIAGNOSIS — R82.81 PYURIA: ICD-10-CM

## 2019-05-24 DIAGNOSIS — E03.9 HYPOTHYROIDISM, UNSPECIFIED TYPE: Primary | ICD-10-CM

## 2019-05-24 DIAGNOSIS — D64.9 ANEMIA, UNSPECIFIED TYPE: ICD-10-CM

## 2019-05-24 DIAGNOSIS — E66.01 MORBID OBESITY WITH BMI OF 40.0-44.9, ADULT (HCC): ICD-10-CM

## 2019-05-24 DIAGNOSIS — I25.10 CORONARY ARTERY DISEASE INVOLVING NATIVE CORONARY ARTERY OF NATIVE HEART WITHOUT ANGINA PECTORIS: ICD-10-CM

## 2019-05-24 PROBLEM — J45.909 ACUTE ASTHMATIC BRONCHITIS: Status: RESOLVED | Noted: 2018-10-26 | Resolved: 2019-05-24

## 2019-05-24 PROCEDURE — G8400 PT W/DXA NO RESULTS DOC: HCPCS | Performed by: INTERNAL MEDICINE

## 2019-05-24 PROCEDURE — G8417 CALC BMI ABV UP PARAM F/U: HCPCS | Performed by: INTERNAL MEDICINE

## 2019-05-24 PROCEDURE — G8427 DOCREV CUR MEDS BY ELIG CLIN: HCPCS | Performed by: INTERNAL MEDICINE

## 2019-05-24 PROCEDURE — 4040F PNEUMOC VAC/ADMIN/RCVD: CPT | Performed by: INTERNAL MEDICINE

## 2019-05-24 PROCEDURE — 99214 OFFICE O/P EST MOD 30 MIN: CPT | Performed by: INTERNAL MEDICINE

## 2019-05-24 PROCEDURE — 1123F ACP DISCUSS/DSCN MKR DOCD: CPT | Performed by: INTERNAL MEDICINE

## 2019-05-24 PROCEDURE — 3017F COLORECTAL CA SCREEN DOC REV: CPT | Performed by: INTERNAL MEDICINE

## 2019-05-24 PROCEDURE — 1036F TOBACCO NON-USER: CPT | Performed by: INTERNAL MEDICINE

## 2019-05-24 PROCEDURE — G8599 NO ASA/ANTIPLAT THER USE RNG: HCPCS | Performed by: INTERNAL MEDICINE

## 2019-05-24 PROCEDURE — 2022F DILAT RTA XM EVC RTNOPTHY: CPT | Performed by: INTERNAL MEDICINE

## 2019-05-24 PROCEDURE — 1090F PRES/ABSN URINE INCON ASSESS: CPT | Performed by: INTERNAL MEDICINE

## 2019-05-24 PROCEDURE — 3044F HG A1C LEVEL LT 7.0%: CPT | Performed by: INTERNAL MEDICINE

## 2019-05-24 RX ORDER — METFORMIN HYDROCHLORIDE 500 MG/1
500 TABLET, EXTENDED RELEASE ORAL DAILY
Qty: 90 TABLET | Refills: 3 | Status: SHIPPED | OUTPATIENT
Start: 2019-05-24 | End: 2019-08-12 | Stop reason: SDUPTHER

## 2019-05-24 RX ORDER — ATORVASTATIN CALCIUM 10 MG/1
10 TABLET, FILM COATED ORAL DAILY
Qty: 90 TABLET | Refills: 3 | Status: SHIPPED | OUTPATIENT
Start: 2019-05-24 | End: 2019-08-20 | Stop reason: SDUPTHER

## 2019-05-24 ASSESSMENT — ENCOUNTER SYMPTOMS
GASTROINTESTINAL NEGATIVE: 1
BACK PAIN: 1
RESPIRATORY NEGATIVE: 1

## 2019-05-24 ASSESSMENT — PATIENT HEALTH QUESTIONNAIRE - PHQ9
SUM OF ALL RESPONSES TO PHQ QUESTIONS 1-9: 0
2. FEELING DOWN, DEPRESSED OR HOPELESS: 0
1. LITTLE INTEREST OR PLEASURE IN DOING THINGS: 0
SUM OF ALL RESPONSES TO PHQ QUESTIONS 1-9: 0
SUM OF ALL RESPONSES TO PHQ9 QUESTIONS 1 & 2: 0

## 2019-05-24 NOTE — PROGRESS NOTES
Lymphadenopathy:     She has no cervical adenopathy. Neurological: She is alert and oriented to person, place, and time. She has normal strength. She displays no tremor. No cranial nerve deficit. Coordination and gait normal.   No focal signs   Skin: She is not diaphoretic. No pallor. Psychiatric: She has a normal mood and affect. Her behavior is normal. Judgment and thought content normal.       ASSESSMENT:       Encounter Diagnoses   Name Primary?  Hypothyroidism, unspecified type Yes    Morbid obesity with BMI of 40.0-44.9, adult ([facility])     Type 2 diabetes mellitus without complication, without long-term current use of insulin ([facility])     Major depressive disorder, single episode, in partial remission ([address]     Essential hypertension     Anemia, unspecified type     Hyperlipidemia, mixed     Renal insufficiency     Pyuria     Coronary artery disease involving native coronary artery of native heart without angina pectoris        Type 2 diabetes mellitus without complication ([address]  Improved to 6.8. Continue Rx-metformin. Continue weight loss. She may meet with our dietitian.     Hypertension  BP well controlled    Hypothyroidism  Continue levothyroxine 100 µg    CAD (coronary artery disease)  No angina symptoms    Renal insufficiency  GFR greater than 60    Hyperlipidemia, mixed  Lipids much better on 10 mg Lipitor continue Rx        PLAN:See ASSESSMENT for evaluation & PLAN     Orders Placed This Encounter   Procedures    Comprehensive Metabolic Panel     Standing Status:   Future     Standing Expiration Date:   5/23/2020    Lipid Panel     Standing Status:   Future     Standing Expiration Date:   5/23/2020     Order Specific Question:   Is Patient Fasting?/# of Hours     Answer:   yes - 8 hours    Hemoglobin A1C     Standing Status:   Future     Standing Expiration Date:   5/23/2020    T4, Free     Standing Status:   Future     Standing Expiration Date:   5/23/2020    TSH without Reflex Standing Status:   Future     Standing Expiration Date:   5/23/2020    CBC Auto Differential     Standing Status:   Future     Standing Expiration Date:   5/23/2020    Microalbumin / Creatinine Urine Ratio     Standing Status:   Future     Standing Expiration Date:   5/23/2020    Urinalysis Reflex to Culture     Standing Status:   Future     Standing Expiration Date:   5/24/2020     Order Specific Question:   SPECIFY(EX-CATH,MIDSTREAM,CYSTO,ETC)? Answer:   NA       PSH, PMH, SH and FH reviewed and noted. Recent and past labs, tests and consultsalso reviewed. Recent or new meds also reviewed.

## 2019-06-12 RX ORDER — ROPINIROLE 1 MG/1
TABLET, FILM COATED ORAL
Qty: 30 TABLET | Refills: 5 | Status: SHIPPED | OUTPATIENT
Start: 2019-06-12 | End: 2019-08-12 | Stop reason: SDUPTHER

## 2019-06-21 DIAGNOSIS — J45.909 ACUTE ASTHMATIC BRONCHITIS: ICD-10-CM

## 2019-07-01 ENCOUNTER — OFFICE VISIT (OUTPATIENT)
Dept: INTERNAL MEDICINE CLINIC | Age: 69
End: 2019-07-01
Payer: MEDICARE

## 2019-07-01 VITALS
DIASTOLIC BLOOD PRESSURE: 88 MMHG | HEIGHT: 67 IN | BODY MASS INDEX: 40.62 KG/M2 | WEIGHT: 258.8 LBS | HEART RATE: 64 BPM | SYSTOLIC BLOOD PRESSURE: 138 MMHG | OXYGEN SATURATION: 97 %

## 2019-07-01 DIAGNOSIS — M19.071 ARTHRITIS OF RIGHT FOOT: Primary | ICD-10-CM

## 2019-07-01 PROCEDURE — G8417 CALC BMI ABV UP PARAM F/U: HCPCS | Performed by: INTERNAL MEDICINE

## 2019-07-01 PROCEDURE — 1036F TOBACCO NON-USER: CPT | Performed by: INTERNAL MEDICINE

## 2019-07-01 PROCEDURE — 3017F COLORECTAL CA SCREEN DOC REV: CPT | Performed by: INTERNAL MEDICINE

## 2019-07-01 PROCEDURE — G8400 PT W/DXA NO RESULTS DOC: HCPCS | Performed by: INTERNAL MEDICINE

## 2019-07-01 PROCEDURE — G8427 DOCREV CUR MEDS BY ELIG CLIN: HCPCS | Performed by: INTERNAL MEDICINE

## 2019-07-01 PROCEDURE — 4040F PNEUMOC VAC/ADMIN/RCVD: CPT | Performed by: INTERNAL MEDICINE

## 2019-07-01 PROCEDURE — 1090F PRES/ABSN URINE INCON ASSESS: CPT | Performed by: INTERNAL MEDICINE

## 2019-07-01 PROCEDURE — 99213 OFFICE O/P EST LOW 20 MIN: CPT | Performed by: INTERNAL MEDICINE

## 2019-07-01 PROCEDURE — G8599 NO ASA/ANTIPLAT THER USE RNG: HCPCS | Performed by: INTERNAL MEDICINE

## 2019-07-01 PROCEDURE — 1123F ACP DISCUSS/DSCN MKR DOCD: CPT | Performed by: INTERNAL MEDICINE

## 2019-07-01 ASSESSMENT — ENCOUNTER SYMPTOMS
COUGH: 0
EYE REDNESS: 0
SHORTNESS OF BREATH: 0
ABDOMINAL PAIN: 0
CHEST TIGHTNESS: 0
BACK PAIN: 0
NAUSEA: 0

## 2019-07-16 DIAGNOSIS — J45.909 ACUTE ASTHMATIC BRONCHITIS: ICD-10-CM

## 2019-07-22 ENCOUNTER — APPOINTMENT (RX ONLY)
Dept: URBAN - METROPOLITAN AREA CLINIC 170 | Facility: CLINIC | Age: 69
Setting detail: DERMATOLOGY
End: 2019-07-22

## 2019-07-22 DIAGNOSIS — Z85.820 PERSONAL HISTORY OF MALIGNANT MELANOMA OF SKIN: ICD-10-CM

## 2019-07-22 DIAGNOSIS — D18.0 HEMANGIOMA: ICD-10-CM

## 2019-07-22 DIAGNOSIS — L81.4 OTHER MELANIN HYPERPIGMENTATION: ICD-10-CM

## 2019-07-22 DIAGNOSIS — L82.1 OTHER SEBORRHEIC KERATOSIS: ICD-10-CM

## 2019-07-22 DIAGNOSIS — D22 MELANOCYTIC NEVI: ICD-10-CM

## 2019-07-22 PROBLEM — D22.5 MELANOCYTIC NEVI OF TRUNK: Status: ACTIVE | Noted: 2019-07-22

## 2019-07-22 PROBLEM — D18.01 HEMANGIOMA OF SKIN AND SUBCUTANEOUS TISSUE: Status: ACTIVE | Noted: 2019-07-22

## 2019-07-22 PROCEDURE — ? COUNSELING

## 2019-07-22 PROCEDURE — 99213 OFFICE O/P EST LOW 20 MIN: CPT

## 2019-07-22 PROCEDURE — ? INVENTORY

## 2019-07-22 ASSESSMENT — LOCATION SIMPLE DESCRIPTION DERM
LOCATION SIMPLE: RIGHT BREAST
LOCATION SIMPLE: CHEST
LOCATION SIMPLE: RIGHT UPPER ARM

## 2019-07-22 ASSESSMENT — LOCATION DETAILED DESCRIPTION DERM
LOCATION DETAILED: RIGHT MEDIAL SUPERIOR CHEST
LOCATION DETAILED: LOWER STERNUM
LOCATION DETAILED: RIGHT ANTECUBITAL SKIN
LOCATION DETAILED: RIGHT MEDIAL BREAST 2-3:00 REGION

## 2019-07-22 ASSESSMENT — LOCATION ZONE DERM
LOCATION ZONE: ARM
LOCATION ZONE: TRUNK

## 2019-07-22 NOTE — HPI: EVALUATION OF SKIN LESION(S)
What Type Of Note Output Would You Prefer (Optional)?: Bullet Format
Hpi Title: Evaluation of Skin Lesions
How Severe Are Your Spot(S)?: mild
Have Your Spot(S) Been Treated In The Past?: has not been treated
Location: Right forearm
Year Removed: 2012

## 2019-08-07 ENCOUNTER — OFFICE VISIT (OUTPATIENT)
Dept: INTERNAL MEDICINE CLINIC | Age: 69
End: 2019-08-07

## 2019-08-07 DIAGNOSIS — E11.9 TYPE 2 DIABETES MELLITUS WITHOUT COMPLICATION, WITHOUT LONG-TERM CURRENT USE OF INSULIN (HCC): Primary | ICD-10-CM

## 2019-08-07 NOTE — PROGRESS NOTES
05/20/2019    K 4.6 05/20/2019     05/20/2019    CO2 23 05/20/2019       Diabetes Medications: Yes  Knows name and dose of prescribed medications Yes  Knows prescribed schedule for medicationsYes  Recent change in medication type/dosage: No  Stores  medications properlyYes  Comments:     Monitoring:   Has BG meter: No    Anthropometric Measurements: Wt:   Wt Readings from Last 3 Encounters:   07/01/19 258 lb 12.8 oz (117.4 kg)   05/24/19 256 lb (116.1 kg)   11/09/18 263 lb 12.8 oz (119.7 kg)      BMI:   BMI Readings from Last 3 Encounters:   07/01/19 40.53 kg/m²   05/24/19 40.10 kg/m²   11/09/18 41.32 kg/m²     260 before Gastric Bypass in 2012, lost down to 180.   Kept off for 6 months, then regained   After surgery, drank protein shakes for 2 - 3 months  Till has problems tolerating rice, noodles, steak, roast beef  Now limits meat intake to chicken, pork chop    Patient's stated goal weight: under 200  7% Weight loss goal weight: 240 lb    Physical Activity History:   Physical activity: not really, other than routine household chores  Obstacles to activity: back pain due to arthritis    Food and Nutrition History:   Nutrition Awareness/Previous DSMES: none  Beverage consumption: water, skim milk occasionally coffee with powdered lightener  Alcohol consumption: none  Frequency of Meals Eaten away from home:none  Food Availability Problems: No  Usual Food consumption:   FOOD RECALL  Up at 9 - 10 am  First meal--Usual time: 10 - 11:30 or skips  Today: egg and coffee  Different Day: [place] breakfast biscuits (4) and coffee    Snack  none    Second meal--Usual time: 6 pm   Recent: leftover chicken noodle soup, egg salad sandwich  Different Day: chicken tenders (unbreaded), zucchini, salad, italian dressing    Snack  Pretzels    Third meal--Usual time: none     Barriers:   -asthma, arthritis, emotions         Follow Up Plan: declined    Referring Provider: Sylvie Calix MD    Time spent with patient: 39

## 2019-08-12 RX ORDER — LEVOTHYROXINE SODIUM 0.1 MG/1
TABLET ORAL
Qty: 90 TABLET | Refills: 3 | Status: SHIPPED | OUTPATIENT
Start: 2019-08-12 | End: 2019-08-20 | Stop reason: SDUPTHER

## 2019-08-12 RX ORDER — ROPINIROLE 1 MG/1
TABLET, FILM COATED ORAL
Qty: 90 TABLET | Refills: 3 | Status: SHIPPED | OUTPATIENT
Start: 2019-08-12 | End: 2020-06-09

## 2019-08-12 RX ORDER — METFORMIN HYDROCHLORIDE 500 MG/1
500 TABLET, EXTENDED RELEASE ORAL DAILY
Qty: 90 TABLET | Refills: 3 | Status: SHIPPED | OUTPATIENT
Start: 2019-08-12 | End: 2019-08-20 | Stop reason: SDUPTHER

## 2019-08-15 ENCOUNTER — TELEPHONE (OUTPATIENT)
Dept: INTERNAL MEDICINE CLINIC | Age: 69
End: 2019-08-15

## 2019-08-15 ENCOUNTER — NURSE ONLY (OUTPATIENT)
Dept: INTERNAL MEDICINE CLINIC | Age: 69
End: 2019-08-15

## 2019-08-15 VITALS — DIASTOLIC BLOOD PRESSURE: 96 MMHG | SYSTOLIC BLOOD PRESSURE: 160 MMHG

## 2019-08-15 PROCEDURE — 99999 PR OFFICE/OUTPT VISIT,PROCEDURE ONLY: CPT | Performed by: INTERNAL MEDICINE

## 2019-08-15 PROCEDURE — 2000F BLOOD PRESSURE MEASURE: CPT | Performed by: INTERNAL MEDICINE

## 2019-08-15 NOTE — TELEPHONE ENCOUNTER
Patient here for nurse visit for BP check. Patient stated that she had been experiencing dizziness and left sided face tingling sensation. Patient's BP in left arm was 160/100. Rechecked BP in right arm and it was 162/110. Spoke with Dr. Michel Robles due to Dr. Aldo Eduardo being out of the office and she recommended patient go to the ED. Patient verbalized understanding and stated that she will be going today.

## 2019-08-16 ENCOUNTER — OFFICE VISIT (OUTPATIENT)
Dept: INTERNAL MEDICINE CLINIC | Age: 69
End: 2019-08-16
Payer: MEDICARE

## 2019-08-16 VITALS
DIASTOLIC BLOOD PRESSURE: 96 MMHG | HEART RATE: 69 BPM | HEIGHT: 67 IN | BODY MASS INDEX: 39.39 KG/M2 | SYSTOLIC BLOOD PRESSURE: 158 MMHG | WEIGHT: 251 LBS | OXYGEN SATURATION: 99 %

## 2019-08-16 DIAGNOSIS — R42 VERTIGO: ICD-10-CM

## 2019-08-16 DIAGNOSIS — M48.061 SPINAL STENOSIS OF LUMBAR REGION WITHOUT NEUROGENIC CLAUDICATION: Chronic | ICD-10-CM

## 2019-08-16 DIAGNOSIS — N28.9 RENAL INSUFFICIENCY: ICD-10-CM

## 2019-08-16 DIAGNOSIS — I10 ESSENTIAL HYPERTENSION: ICD-10-CM

## 2019-08-16 PROCEDURE — G8400 PT W/DXA NO RESULTS DOC: HCPCS | Performed by: INTERNAL MEDICINE

## 2019-08-16 PROCEDURE — 99214 OFFICE O/P EST MOD 30 MIN: CPT | Performed by: INTERNAL MEDICINE

## 2019-08-16 PROCEDURE — 4040F PNEUMOC VAC/ADMIN/RCVD: CPT | Performed by: INTERNAL MEDICINE

## 2019-08-16 PROCEDURE — G8427 DOCREV CUR MEDS BY ELIG CLIN: HCPCS | Performed by: INTERNAL MEDICINE

## 2019-08-16 PROCEDURE — 1090F PRES/ABSN URINE INCON ASSESS: CPT | Performed by: INTERNAL MEDICINE

## 2019-08-16 PROCEDURE — G8417 CALC BMI ABV UP PARAM F/U: HCPCS | Performed by: INTERNAL MEDICINE

## 2019-08-16 PROCEDURE — 3017F COLORECTAL CA SCREEN DOC REV: CPT | Performed by: INTERNAL MEDICINE

## 2019-08-16 PROCEDURE — 1036F TOBACCO NON-USER: CPT | Performed by: INTERNAL MEDICINE

## 2019-08-16 PROCEDURE — 1123F ACP DISCUSS/DSCN MKR DOCD: CPT | Performed by: INTERNAL MEDICINE

## 2019-08-16 PROCEDURE — G8599 NO ASA/ANTIPLAT THER USE RNG: HCPCS | Performed by: INTERNAL MEDICINE

## 2019-08-16 RX ORDER — TRIAMTERENE AND HYDROCHLOROTHIAZIDE 37.5; 25 MG/1; MG/1
1 TABLET ORAL DAILY
Qty: 90 TABLET | Refills: 3 | Status: SHIPPED | OUTPATIENT
Start: 2019-08-16 | End: 2020-08-25

## 2019-08-16 ASSESSMENT — ENCOUNTER SYMPTOMS
RESPIRATORY NEGATIVE: 1
BACK PAIN: 1

## 2019-08-16 NOTE — PROGRESS NOTES
SUBJECTIVE:  Patient ID: Irlanda Reyes is an 76 y.o. female. HPI: Patient here today for the f/u of chronic problems-- see Problem List and associated comments. New issues or complaints include (alsosee Assessment for more details): Patient blood pressure has been creeping up recently. It was noticed to be elevated at her pain doctor's office. She stopped by our office yesterday and it was still elevated. Patient went to the emergency room was confirmed all elevated as well. She had multiple tests done which were all normal.  She has been off her hydrochlorothiazide for several months now. She still has occasional lightheadedness which sounds more like occasional episodes of vertigo. She recently received an RAE for her back but none of her pain medications have been changed recently. Review of Systems   Constitutional: Negative for activity change and appetite change. Respiratory: Negative. Cardiovascular: Negative. Genitourinary: Negative. Musculoskeletal: Positive for back pain. Neurological: Positive for dizziness. Negative for headaches. Psychiatric/Behavioral: Negative. OBJECTIVE:    BP (!) 158/96 (Site: Right Upper Arm, Position: Sitting, Cuff Size: Large Adult)   Pulse 69   Ht 5' 7\" (1.702 m)   Wt 251 lb (113.9 kg)   SpO2 99%   BMI 39.31 kg/m²      Physical Exam   Constitutional: She is oriented to person, place, and time. She appears well-developed and well-nourished. Overweight   Eyes: Pupils are equal, round, and reactive to light. EOM are normal.   Cardiovascular: Normal rate, regular rhythm and normal heart sounds. Pulmonary/Chest: Effort normal and breath sounds normal. No respiratory distress. Abdominal: Soft. Bowel sounds are normal.   Musculoskeletal: She exhibits no edema. Neurological: She is alert and oriented to person, place, and time. She has normal strength. She displays no tremor.  Coordination and gait normal.   Mildly positive Jorge-Hallpike

## 2019-08-20 RX ORDER — METFORMIN HYDROCHLORIDE 500 MG/1
500 TABLET, EXTENDED RELEASE ORAL DAILY
Qty: 90 TABLET | Refills: 3 | Status: SHIPPED | OUTPATIENT
Start: 2019-08-20 | End: 2019-10-06 | Stop reason: SDUPTHER

## 2019-08-20 RX ORDER — ATORVASTATIN CALCIUM 10 MG/1
10 TABLET, FILM COATED ORAL DAILY
Qty: 90 TABLET | Refills: 3 | Status: SHIPPED | OUTPATIENT
Start: 2019-08-20 | End: 2019-10-07 | Stop reason: SDUPTHER

## 2019-08-20 RX ORDER — LEVOTHYROXINE SODIUM 0.1 MG/1
TABLET ORAL
Qty: 90 TABLET | Refills: 3 | Status: SHIPPED | OUTPATIENT
Start: 2019-08-20 | End: 2021-02-22 | Stop reason: SDUPTHER

## 2019-08-23 ENCOUNTER — TELEPHONE (OUTPATIENT)
Dept: INTERNAL MEDICINE CLINIC | Age: 69
End: 2019-08-23

## 2019-08-23 RX ORDER — LEVOTHYROXINE SODIUM 0.05 MG/1
TABLET ORAL
Qty: 180 TABLET | Refills: 2 | Status: SHIPPED | OUTPATIENT
Start: 2019-08-23 | End: 2020-04-07

## 2019-09-04 ENCOUNTER — TELEPHONE (OUTPATIENT)
Dept: INTERNAL MEDICINE CLINIC | Age: 69
End: 2019-09-04

## 2019-09-04 RX ORDER — AMOXICILLIN AND CLAVULANATE POTASSIUM 875; 125 MG/1; MG/1
1 TABLET, FILM COATED ORAL 2 TIMES DAILY
Qty: 20 TABLET | Refills: 0 | Status: SHIPPED | OUTPATIENT
Start: 2019-09-04 | End: 2019-09-14

## 2019-09-05 ENCOUNTER — TELEPHONE (OUTPATIENT)
Dept: INTERNAL MEDICINE CLINIC | Age: 69
End: 2019-09-05

## 2019-09-05 RX ORDER — ALBUTEROL SULFATE 90 UG/1
2 AEROSOL, METERED RESPIRATORY (INHALATION) EVERY 6 HOURS PRN
Qty: 1 INHALER | Refills: 1 | Status: SHIPPED | OUTPATIENT
Start: 2019-09-05 | End: 2020-03-27 | Stop reason: SDUPTHER

## 2019-09-05 NOTE — TELEPHONE ENCOUNTER
Pt states she needs a rescue inhaler also sent to pharmacy. Pt inform every time she experience allergy symptoms it triggers her asthma.      [facility] [address] [facility]

## 2019-09-13 ENCOUNTER — TELEPHONE (OUTPATIENT)
Dept: INTERNAL MEDICINE CLINIC | Age: 69
End: 2019-09-13

## 2019-09-13 RX ORDER — AZITHROMYCIN 250 MG/1
250 TABLET, FILM COATED ORAL SEE ADMIN INSTRUCTIONS
Qty: 6 TABLET | Refills: 0 | Status: SHIPPED | OUTPATIENT
Start: 2019-09-13 | End: 2019-09-17 | Stop reason: SDUPTHER

## 2019-09-17 ENCOUNTER — HOSPITAL ENCOUNTER (OUTPATIENT)
Age: 69
End: 2019-09-17
Payer: MEDICARE

## 2019-09-17 ENCOUNTER — OFFICE VISIT (OUTPATIENT)
Dept: INTERNAL MEDICINE CLINIC | Age: 69
End: 2019-09-17
Payer: MEDICARE

## 2019-09-17 ENCOUNTER — HOSPITAL ENCOUNTER (OUTPATIENT)
Dept: GENERAL RADIOLOGY | Age: 69
Discharge: HOME | End: 2019-09-17
Payer: MEDICARE

## 2019-09-17 VITALS
SYSTOLIC BLOOD PRESSURE: 128 MMHG | BODY MASS INDEX: 39.55 KG/M2 | TEMPERATURE: 98.7 F | DIASTOLIC BLOOD PRESSURE: 80 MMHG | HEIGHT: 67 IN | WEIGHT: 252 LBS | HEART RATE: 68 BPM | OXYGEN SATURATION: 96 %

## 2019-09-17 DIAGNOSIS — J22 LOWER RESPIRATORY INFECTION: ICD-10-CM

## 2019-09-17 PROCEDURE — 3017F COLORECTAL CA SCREEN DOC REV: CPT | Performed by: INTERNAL MEDICINE

## 2019-09-17 PROCEDURE — G8427 DOCREV CUR MEDS BY ELIG CLIN: HCPCS | Performed by: INTERNAL MEDICINE

## 2019-09-17 PROCEDURE — 99213 OFFICE O/P EST LOW 20 MIN: CPT | Performed by: INTERNAL MEDICINE

## 2019-09-17 PROCEDURE — G8599 NO ASA/ANTIPLAT THER USE RNG: HCPCS | Performed by: INTERNAL MEDICINE

## 2019-09-17 PROCEDURE — 1123F ACP DISCUSS/DSCN MKR DOCD: CPT | Performed by: INTERNAL MEDICINE

## 2019-09-17 PROCEDURE — G8417 CALC BMI ABV UP PARAM F/U: HCPCS | Performed by: INTERNAL MEDICINE

## 2019-09-17 PROCEDURE — 4040F PNEUMOC VAC/ADMIN/RCVD: CPT | Performed by: INTERNAL MEDICINE

## 2019-09-17 PROCEDURE — 1036F TOBACCO NON-USER: CPT | Performed by: INTERNAL MEDICINE

## 2019-09-17 PROCEDURE — 1090F PRES/ABSN URINE INCON ASSESS: CPT | Performed by: INTERNAL MEDICINE

## 2019-09-17 PROCEDURE — G8400 PT W/DXA NO RESULTS DOC: HCPCS | Performed by: INTERNAL MEDICINE

## 2019-09-17 PROCEDURE — 71046 X-RAY EXAM CHEST 2 VIEWS: CPT

## 2019-09-17 RX ORDER — AZITHROMYCIN 250 MG/1
250 TABLET, FILM COATED ORAL SEE ADMIN INSTRUCTIONS
Qty: 6 TABLET | Refills: 0 | Status: SHIPPED | OUTPATIENT
Start: 2019-09-17 | End: 2019-09-22

## 2019-09-17 ASSESSMENT — ENCOUNTER SYMPTOMS
GASTROINTESTINAL NEGATIVE: 1
COUGH: 1
WHEEZING: 0
STRIDOR: 0
RHINORRHEA: 1
SHORTNESS OF BREATH: 0
SORE THROAT: 0

## 2019-09-17 ASSESSMENT — PATIENT HEALTH QUESTIONNAIRE - PHQ9
SUM OF ALL RESPONSES TO PHQ QUESTIONS 1-9: 0
2. FEELING DOWN, DEPRESSED OR HOPELESS: 0
SUM OF ALL RESPONSES TO PHQ9 QUESTIONS 1 & 2: 0
1. LITTLE INTEREST OR PLEASURE IN DOING THINGS: 0
SUM OF ALL RESPONSES TO PHQ QUESTIONS 1-9: 0

## 2019-10-07 RX ORDER — METFORMIN HYDROCHLORIDE 500 MG/1
TABLET, EXTENDED RELEASE ORAL
Qty: 90 TABLET | Refills: 3 | Status: SHIPPED | OUTPATIENT
Start: 2019-10-07 | End: 2020-12-09

## 2019-10-07 RX ORDER — ATORVASTATIN CALCIUM 10 MG/1
TABLET, FILM COATED ORAL
Qty: 90 TABLET | Refills: 3 | Status: SHIPPED | OUTPATIENT
Start: 2019-10-07 | End: 2020-12-09

## 2019-12-19 ENCOUNTER — HOSPITAL ENCOUNTER (OUTPATIENT)
Age: 69
Setting detail: OUTPATIENT SURGERY
Discharge: HOME | End: 2019-12-19
Attending: PAIN MEDICINE | Admitting: PAIN MEDICINE
Payer: MEDICARE

## 2019-12-19 VITALS
BODY MASS INDEX: 37.03 KG/M2 | WEIGHT: 250 LBS | SYSTOLIC BLOOD PRESSURE: 148 MMHG | OXYGEN SATURATION: 100 % | HEIGHT: 69 IN | DIASTOLIC BLOOD PRESSURE: 78 MMHG | TEMPERATURE: 96.7 F | HEART RATE: 63 BPM | RESPIRATION RATE: 16 BRPM

## 2019-12-19 LAB
GLUCOSE BLD-MCNC: 117 MG/DL (ref 70–99)
PERFORMED ON: ABNORMAL

## 2019-12-19 PROCEDURE — 7100000011 HC PHASE II RECOVERY - ADDTL 15 MIN: Performed by: PAIN MEDICINE

## 2019-12-19 PROCEDURE — 99152 MOD SED SAME PHYS/QHP 5/>YRS: CPT | Performed by: PAIN MEDICINE

## 2019-12-19 PROCEDURE — 77003 FLUOROGUIDE FOR SPINE INJECT: CPT | Performed by: PAIN MEDICINE

## 2019-12-19 PROCEDURE — 6360000002 HC RX W HCPCS: Performed by: PAIN MEDICINE

## 2019-12-19 PROCEDURE — 3600000012 HC SURGERY LEVEL 2 ADDTL 15MIN: Performed by: PAIN MEDICINE

## 2019-12-19 PROCEDURE — 2709999900 HC NON-CHARGEABLE SUPPLY: Performed by: PAIN MEDICINE

## 2019-12-19 PROCEDURE — 2500000003 HC RX 250 WO HCPCS: Performed by: PAIN MEDICINE

## 2019-12-19 PROCEDURE — 7100000010 HC PHASE II RECOVERY - FIRST 15 MIN: Performed by: PAIN MEDICINE

## 2019-12-19 PROCEDURE — 3600000002 HC SURGERY LEVEL 2 BASE: Performed by: PAIN MEDICINE

## 2019-12-19 PROCEDURE — 99153 MOD SED SAME PHYS/QHP EA: CPT | Performed by: PAIN MEDICINE

## 2019-12-19 PROCEDURE — 2580000003 HC RX 258

## 2019-12-19 PROCEDURE — 64635 DESTROY LUMB/SAC FACET JNT: CPT | Performed by: PAIN MEDICINE

## 2019-12-19 PROCEDURE — 64636 DESTROY L/S FACET JNT ADDL: CPT | Performed by: PAIN MEDICINE

## 2019-12-19 RX ORDER — LIDOCAINE HYDROCHLORIDE 10 MG/ML
INJECTION, SOLUTION EPIDURAL; INFILTRATION; INTRACAUDAL; PERINEURAL PRN
Status: DISCONTINUED | OUTPATIENT
Start: 2019-12-19 | End: 2019-12-19 | Stop reason: ALTCHOICE

## 2019-12-19 RX ORDER — FENTANYL CITRATE 50 UG/ML
INJECTION, SOLUTION INTRAMUSCULAR; INTRAVENOUS PRN
Status: DISCONTINUED | OUTPATIENT
Start: 2019-12-19 | End: 2019-12-19 | Stop reason: ALTCHOICE

## 2019-12-19 RX ORDER — SODIUM CHLORIDE, SODIUM LACTATE, POTASSIUM CHLORIDE, CALCIUM CHLORIDE 600; 310; 30; 20 MG/100ML; MG/100ML; MG/100ML; MG/100ML
INJECTION, SOLUTION INTRAVENOUS
Status: COMPLETED
Start: 2019-12-19 | End: 2019-12-19

## 2019-12-19 RX ORDER — SODIUM CHLORIDE, SODIUM LACTATE, POTASSIUM CHLORIDE, CALCIUM CHLORIDE 600; 310; 30; 20 MG/100ML; MG/100ML; MG/100ML; MG/100ML
INJECTION, SOLUTION INTRAVENOUS CONTINUOUS
Status: CANCELLED | OUTPATIENT
Start: 2019-12-19

## 2019-12-19 RX ORDER — MIDAZOLAM HYDROCHLORIDE 1 MG/ML
INJECTION INTRAMUSCULAR; INTRAVENOUS PRN
Status: DISCONTINUED | OUTPATIENT
Start: 2019-12-19 | End: 2019-12-19 | Stop reason: ALTCHOICE

## 2019-12-19 RX ORDER — LIDOCAINE HYDROCHLORIDE 20 MG/ML
INJECTION, SOLUTION EPIDURAL; INFILTRATION; INTRACAUDAL; PERINEURAL PRN
Status: DISCONTINUED | OUTPATIENT
Start: 2019-12-19 | End: 2019-12-19 | Stop reason: ALTCHOICE

## 2019-12-19 RX ADMIN — LIDOCAINE HYDROCHLORIDE 0.1 ML: 10 INJECTION, SOLUTION EPIDURAL; INFILTRATION; INTRACAUDAL; PERINEURAL at 12:36

## 2019-12-19 RX ADMIN — SODIUM CHLORIDE, POTASSIUM CHLORIDE, SODIUM LACTATE AND CALCIUM CHLORIDE 1000 ML: 600; 310; 30; 20 INJECTION, SOLUTION INTRAVENOUS at 12:36

## 2019-12-19 ASSESSMENT — PAIN - FUNCTIONAL ASSESSMENT
PAIN_FUNCTIONAL_ASSESSMENT: 0-10
PAIN_FUNCTIONAL_ASSESSMENT: PREVENTS OR INTERFERES WITH MANY ACTIVE NOT PASSIVE ACTIVITIES

## 2019-12-19 ASSESSMENT — PAIN DESCRIPTION - DESCRIPTORS: DESCRIPTORS: STABBING;SQUEEZING

## 2020-03-27 RX ORDER — ALBUTEROL SULFATE 90 UG/1
2 AEROSOL, METERED RESPIRATORY (INHALATION) EVERY 6 HOURS PRN
Qty: 1 INHALER | Refills: 1 | Status: SHIPPED | OUTPATIENT
Start: 2020-03-27 | End: 2021-01-05

## 2020-03-27 NOTE — TELEPHONE ENCOUNTER
The patient is requesting a refill    albuterol sulfate  (90 Base) MCG/ACT inhaler     [facility]/pharmacy #[id number]- [place], [place] - [address]. - P [phone] - F [phone]

## 2020-04-07 RX ORDER — LEVOTHYROXINE SODIUM 0.05 MG/1
TABLET ORAL
Qty: 180 TABLET | Refills: 0 | Status: SHIPPED | OUTPATIENT
Start: 2020-04-07 | End: 2020-07-13

## 2020-06-09 RX ORDER — ROPINIROLE 1 MG/1
TABLET, FILM COATED ORAL
Qty: 90 TABLET | Refills: 1 | Status: SHIPPED | OUTPATIENT
Start: 2020-06-09 | End: 2020-12-09

## 2020-07-13 RX ORDER — LEVOTHYROXINE SODIUM 0.05 MG/1
TABLET ORAL
Qty: 180 TABLET | Refills: 1 | Status: SHIPPED | OUTPATIENT
Start: 2020-07-13 | End: 2020-07-29 | Stop reason: SDUPTHER

## 2020-07-22 ENCOUNTER — APPOINTMENT (RX ONLY)
Dept: URBAN - METROPOLITAN AREA CLINIC 170 | Facility: CLINIC | Age: 70
Setting detail: DERMATOLOGY
End: 2020-07-22

## 2020-07-22 DIAGNOSIS — L82.1 OTHER SEBORRHEIC KERATOSIS: ICD-10-CM

## 2020-07-22 DIAGNOSIS — L81.4 OTHER MELANIN HYPERPIGMENTATION: ICD-10-CM

## 2020-07-22 DIAGNOSIS — D18.0 HEMANGIOMA: ICD-10-CM

## 2020-07-22 DIAGNOSIS — Z85.820 PERSONAL HISTORY OF MALIGNANT MELANOMA OF SKIN: ICD-10-CM

## 2020-07-22 DIAGNOSIS — D22 MELANOCYTIC NEVI: ICD-10-CM

## 2020-07-22 PROBLEM — D22.5 MELANOCYTIC NEVI OF TRUNK: Status: ACTIVE | Noted: 2020-07-22

## 2020-07-22 PROBLEM — D18.01 HEMANGIOMA OF SKIN AND SUBCUTANEOUS TISSUE: Status: ACTIVE | Noted: 2020-07-22

## 2020-07-22 PROCEDURE — 99213 OFFICE O/P EST LOW 20 MIN: CPT

## 2020-07-22 PROCEDURE — ? ADDITIONAL NOTES

## 2020-07-22 PROCEDURE — ? FULL BODY SKIN EXAM

## 2020-07-22 PROCEDURE — ? COUNSELING

## 2020-07-22 ASSESSMENT — LOCATION SIMPLE DESCRIPTION DERM
LOCATION SIMPLE: RIGHT UPPER ARM
LOCATION SIMPLE: RIGHT BREAST
LOCATION SIMPLE: CHEST

## 2020-07-22 ASSESSMENT — LOCATION ZONE DERM
LOCATION ZONE: TRUNK
LOCATION ZONE: ARM

## 2020-07-22 ASSESSMENT — LOCATION DETAILED DESCRIPTION DERM
LOCATION DETAILED: RIGHT MEDIAL SUPERIOR CHEST
LOCATION DETAILED: RIGHT ANTECUBITAL SKIN
LOCATION DETAILED: RIGHT MEDIAL BREAST 2-3:00 REGION
LOCATION DETAILED: LOWER STERNUM

## 2020-07-29 ENCOUNTER — OFFICE VISIT (OUTPATIENT)
Dept: INTERNAL MEDICINE CLINIC | Age: 70
End: 2020-07-29
Payer: MEDICARE

## 2020-07-29 VITALS
HEIGHT: 69 IN | SYSTOLIC BLOOD PRESSURE: 128 MMHG | TEMPERATURE: 98 F | WEIGHT: 256.6 LBS | BODY MASS INDEX: 38 KG/M2 | DIASTOLIC BLOOD PRESSURE: 68 MMHG

## 2020-07-29 DIAGNOSIS — E78.2 HYPERLIPIDEMIA, MIXED: ICD-10-CM

## 2020-07-29 DIAGNOSIS — D64.9 ANEMIA, UNSPECIFIED TYPE: ICD-10-CM

## 2020-07-29 DIAGNOSIS — I10 ESSENTIAL HYPERTENSION: ICD-10-CM

## 2020-07-29 DIAGNOSIS — M10.072 ACUTE IDIOPATHIC GOUT INVOLVING TOE OF LEFT FOOT: ICD-10-CM

## 2020-07-29 DIAGNOSIS — E03.9 HYPOTHYROIDISM, UNSPECIFIED TYPE: ICD-10-CM

## 2020-07-29 DIAGNOSIS — N28.9 RENAL INSUFFICIENCY: ICD-10-CM

## 2020-07-29 DIAGNOSIS — E11.9 TYPE 2 DIABETES MELLITUS WITHOUT COMPLICATION, WITHOUT LONG-TERM CURRENT USE OF INSULIN (HCC): ICD-10-CM

## 2020-07-29 PROBLEM — R82.81 PYURIA: Status: RESOLVED | Noted: 2017-09-25 | Resolved: 2020-07-29

## 2020-07-29 PROBLEM — R42 VERTIGO: Status: RESOLVED | Noted: 2019-08-16 | Resolved: 2020-07-29

## 2020-07-29 PROBLEM — J22 LOWER RESPIRATORY INFECTION: Status: RESOLVED | Noted: 2019-09-17 | Resolved: 2020-07-29

## 2020-07-29 PROBLEM — M48.061 SPINAL STENOSIS, LUMBAR REGION, WITHOUT NEUROGENIC CLAUDICATION: Status: RESOLVED | Noted: 2018-10-29 | Resolved: 2020-07-29

## 2020-07-29 PROBLEM — M10.071 ACUTE IDIOPATHIC GOUT INVOLVING TOE OF RIGHT FOOT: Status: ACTIVE | Noted: 2020-07-29

## 2020-07-29 LAB
A/G RATIO: 1.6 (ref 1.1–2.2)
ALBUMIN SERPL-MCNC: 4.1 G/DL (ref 3.4–5)
ALP BLD-CCNC: 89 U/L (ref 40–129)
ALT SERPL-CCNC: 16 U/L (ref 10–40)
ANION GAP SERPL CALCULATED.3IONS-SCNC: 19 MMOL/L (ref 3–16)
AST SERPL-CCNC: 19 U/L (ref 15–37)
BASOPHILS ABSOLUTE: 0 K/UL (ref 0–0.2)
BASOPHILS RELATIVE PERCENT: 0.5 %
BILIRUB SERPL-MCNC: 0.4 MG/DL (ref 0–1)
BILIRUBIN URINE: NEGATIVE
BLOOD, URINE: NEGATIVE
BUN BLDV-MCNC: 21 MG/DL (ref 7–20)
CALCIUM SERPL-MCNC: 9.8 MG/DL (ref 8.3–10.6)
CHLORIDE BLD-SCNC: 97 MMOL/L (ref 99–110)
CHOLESTEROL, TOTAL: 158 MG/DL (ref 0–199)
CLARITY: CLEAR
CO2: 26 MMOL/L (ref 21–32)
COLOR: YELLOW
CREAT SERPL-MCNC: 0.9 MG/DL (ref 0.6–1.2)
CREATININE URINE: 176 MG/DL (ref 28–259)
EOSINOPHILS ABSOLUTE: 0.2 K/UL (ref 0–0.6)
EOSINOPHILS RELATIVE PERCENT: 2.4 %
EPITHELIAL CELLS, UA: 5 /HPF (ref 0–5)
ESTIMATED AVERAGE GLUCOSE: 180 MG/DL
GFR AFRICAN AMERICAN: >60
GFR NON-AFRICAN AMERICAN: >60
GLOBULIN: 2.5 G/DL
GLUCOSE BLD-MCNC: 153 MG/DL (ref 70–99)
GLUCOSE URINE: NEGATIVE MG/DL
HBA1C MFR BLD: 7.9 %
HCT VFR BLD CALC: 38.9 % (ref 36–48)
HDLC SERPL-MCNC: 45 MG/DL (ref 40–60)
HEMOGLOBIN: 12.5 G/DL (ref 12–16)
HYALINE CASTS: 5 /LPF (ref 0–8)
KETONES, URINE: NEGATIVE MG/DL
LDL CHOLESTEROL CALCULATED: 59 MG/DL
LEUKOCYTE ESTERASE, URINE: ABNORMAL
LYMPHOCYTES ABSOLUTE: 2 K/UL (ref 1–5.1)
LYMPHOCYTES RELATIVE PERCENT: 19.4 %
MCH RBC QN AUTO: 24.1 PG (ref 26–34)
MCHC RBC AUTO-ENTMCNC: 32.1 G/DL (ref 31–36)
MCV RBC AUTO: 75.1 FL (ref 80–100)
MICROALBUMIN UR-MCNC: <1.2 MG/DL
MICROALBUMIN/CREAT UR-RTO: NORMAL MG/G (ref 0–30)
MICROSCOPIC EXAMINATION: YES
MONOCYTES ABSOLUTE: 0.8 K/UL (ref 0–1.3)
MONOCYTES RELATIVE PERCENT: 7.7 %
NEUTROPHILS ABSOLUTE: 7.1 K/UL (ref 1.7–7.7)
NEUTROPHILS RELATIVE PERCENT: 70 %
NITRITE, URINE: NEGATIVE
PDW BLD-RTO: 17.3 % (ref 12.4–15.4)
PH UA: 7 (ref 5–8)
PLATELET # BLD: 409 K/UL (ref 135–450)
PMV BLD AUTO: 8.6 FL (ref 5–10.5)
POTASSIUM SERPL-SCNC: 4.4 MMOL/L (ref 3.5–5.1)
PROTEIN UA: ABNORMAL MG/DL
RBC # BLD: 5.18 M/UL (ref 4–5.2)
RBC UA: 5 /HPF (ref 0–4)
SODIUM BLD-SCNC: 142 MMOL/L (ref 136–145)
SPECIFIC GRAVITY UA: 1.02 (ref 1–1.03)
T4 FREE: 1.6 NG/DL (ref 0.9–1.8)
TOTAL PROTEIN: 6.6 G/DL (ref 6.4–8.2)
TRIGL SERPL-MCNC: 269 MG/DL (ref 0–150)
TSH SERPL DL<=0.05 MIU/L-ACNC: 2.13 UIU/ML (ref 0.27–4.2)
URIC ACID, SERUM: 9.5 MG/DL (ref 2.6–6)
URINE REFLEX TO CULTURE: ABNORMAL
URINE TYPE: ABNORMAL
UROBILINOGEN, URINE: 0.2 E.U./DL
VLDLC SERPL CALC-MCNC: 54 MG/DL
WBC # BLD: 10.1 K/UL (ref 4–11)
WBC UA: 6 /HPF (ref 0–5)

## 2020-07-29 PROCEDURE — 1090F PRES/ABSN URINE INCON ASSESS: CPT | Performed by: INTERNAL MEDICINE

## 2020-07-29 PROCEDURE — 3017F COLORECTAL CA SCREEN DOC REV: CPT | Performed by: INTERNAL MEDICINE

## 2020-07-29 PROCEDURE — G8417 CALC BMI ABV UP PARAM F/U: HCPCS | Performed by: INTERNAL MEDICINE

## 2020-07-29 PROCEDURE — 2022F DILAT RTA XM EVC RTNOPTHY: CPT | Performed by: INTERNAL MEDICINE

## 2020-07-29 PROCEDURE — 99214 OFFICE O/P EST MOD 30 MIN: CPT | Performed by: INTERNAL MEDICINE

## 2020-07-29 PROCEDURE — 4040F PNEUMOC VAC/ADMIN/RCVD: CPT | Performed by: INTERNAL MEDICINE

## 2020-07-29 PROCEDURE — 1123F ACP DISCUSS/DSCN MKR DOCD: CPT | Performed by: INTERNAL MEDICINE

## 2020-07-29 PROCEDURE — G8400 PT W/DXA NO RESULTS DOC: HCPCS | Performed by: INTERNAL MEDICINE

## 2020-07-29 PROCEDURE — 3051F HG A1C>EQUAL 7.0%<8.0%: CPT | Performed by: INTERNAL MEDICINE

## 2020-07-29 PROCEDURE — 1036F TOBACCO NON-USER: CPT | Performed by: INTERNAL MEDICINE

## 2020-07-29 PROCEDURE — G8427 DOCREV CUR MEDS BY ELIG CLIN: HCPCS | Performed by: INTERNAL MEDICINE

## 2020-07-29 RX ORDER — PREDNISONE 10 MG/1
TABLET ORAL
Qty: 30 TABLET | Refills: 0 | Status: SHIPPED | OUTPATIENT
Start: 2020-07-29 | End: 2020-08-03

## 2020-07-29 ASSESSMENT — ENCOUNTER SYMPTOMS
BACK PAIN: 1
RESPIRATORY NEGATIVE: 1
GASTROINTESTINAL NEGATIVE: 1
CHEST TIGHTNESS: 0

## 2020-07-29 NOTE — PROGRESS NOTES
SUBJECTIVE:  Patient ID: Anson Hayes is an 71 y.o. female. HPI: Patient here today for the f/u of chronic problems-- see Problem List and associated comments. New issues or complaints include (also see Assessment for more details): Approximately 3 days of pain in her left great toe. She awoke with the pain suddenly. She recalls no trauma. She has never had gout attacks before. She has had significant lumbar disc disease and she is seeing some specialist.  She is now going to pain management where she has received RAE's. Her last injection was approximately 5 months ago. It did raise her sugar temporarily. She believes her sugars are doing well but she is not sure. She is compliant with all of her other medication. To the [place] pandemic she has not been back here for follow-up. She has not any signs or symptoms of infection during the pandemic. Review of Systems   Constitutional: Negative for fatigue and fever. Eyes: Negative for visual disturbance. Respiratory: Negative. Negative for chest tightness. Cardiovascular: Negative. Negative for chest pain. Gastrointestinal: Negative. Genitourinary: Negative for dysuria. Musculoskeletal: Positive for arthralgias and back pain. Allergic/Immunologic: Negative for immunocompromised state. Neurological: Negative for light-headedness. Psychiatric/Behavioral: Negative. OBJECTIVE:    /68 (Site: Right Upper Arm)   Temp 98 °F (36.7 °C)   Ht 5' 9\" (1.753 m)   Wt 256 lb 9.6 oz (116.4 kg)   BMI 37.89 kg/m²      Physical Exam  Constitutional:       Appearance: Normal appearance. She is obese. She is not ill-appearing. Eyes:      General: No scleral icterus. Extraocular Movements: Extraocular movements intact. Neck:      Vascular: No carotid bruit. Cardiovascular:      Rate and Rhythm: Normal rate and regular rhythm. Pulses: Normal pulses.            Carotid pulses are 2+ on the right side and 2+ on the left side.       Radial pulses are 2+ on the right side and 2+ on the left side. Posterior tibial pulses are 2+ on the right side and 2+ on the left side. Heart sounds: No murmur. No gallop. Pulmonary:      Effort: Pulmonary effort is normal. No respiratory distress. Breath sounds: Normal breath sounds. No wheezing or rales. Abdominal:      General: Bowel sounds are normal.      Palpations: Abdomen is soft. Musculoskeletal:      Right lower leg: No edema. Left lower leg: No edema. Feet:    Lymphadenopathy:      Cervical: No cervical adenopathy. Skin:     Coloration: Skin is not pale. Neurological:      General: No focal deficit present. Mental Status: She is alert and oriented to person, place, and time. Psychiatric:         Mood and Affect: Mood normal.         Thought Content: Thought content normal.         Judgment: Judgment normal.         ASSESSMENT:       Encounter Diagnoses   Name Primary?  Anemia, unspecified type Yes    Acute idiopathic gout involving toe of left foot     Hyperlipidemia, mixed     Essential hypertension     Hypothyroidism, unspecified type     Renal insufficiency     Type 2 diabetes mellitus without complication, without long-term current use of insulin (HCC)     Lumbar degenerative disc disease     Coronary artery disease involving native coronary artery of native heart without angina pectoris        Acute idiopathic gout involving toe of left foot  First attack. Check labs and uric acid level. Prednisone Rx-will elevate sugars temporarily. May need allopurinol. Lumbar degenerative disc disease  Status post evaluation. She has been to pain management and received RAE. Type 2 diabetes mellitus without complication (HCC)  Oral medications. Check labs today. Sugars will temporarily elevate with prednisone.     Renal insufficiency  Check labs-no NSAIDs for her gout attack    Hypothyroidism  Check TFT on 100

## 2020-07-30 LAB — URINE CULTURE, ROUTINE: NORMAL

## 2020-07-31 ENCOUNTER — TELEPHONE (OUTPATIENT)
Dept: INTERNAL MEDICINE CLINIC | Age: 70
End: 2020-07-31

## 2020-08-03 ENCOUNTER — OFFICE VISIT (OUTPATIENT)
Dept: INTERNAL MEDICINE CLINIC | Age: 70
End: 2020-08-03
Payer: MEDICARE

## 2020-08-03 VITALS
TEMPERATURE: 97.9 F | WEIGHT: 256.2 LBS | HEIGHT: 69 IN | BODY MASS INDEX: 37.95 KG/M2 | SYSTOLIC BLOOD PRESSURE: 128 MMHG | DIASTOLIC BLOOD PRESSURE: 70 MMHG

## 2020-08-03 PROBLEM — E79.0 HYPERURICEMIA: Status: ACTIVE | Noted: 2020-07-29

## 2020-08-03 PROCEDURE — 2022F DILAT RTA XM EVC RTNOPTHY: CPT | Performed by: INTERNAL MEDICINE

## 2020-08-03 PROCEDURE — 3051F HG A1C>EQUAL 7.0%<8.0%: CPT | Performed by: INTERNAL MEDICINE

## 2020-08-03 PROCEDURE — 1036F TOBACCO NON-USER: CPT | Performed by: INTERNAL MEDICINE

## 2020-08-03 PROCEDURE — 4040F PNEUMOC VAC/ADMIN/RCVD: CPT | Performed by: INTERNAL MEDICINE

## 2020-08-03 PROCEDURE — 1123F ACP DISCUSS/DSCN MKR DOCD: CPT | Performed by: INTERNAL MEDICINE

## 2020-08-03 PROCEDURE — 1090F PRES/ABSN URINE INCON ASSESS: CPT | Performed by: INTERNAL MEDICINE

## 2020-08-03 PROCEDURE — 99214 OFFICE O/P EST MOD 30 MIN: CPT | Performed by: INTERNAL MEDICINE

## 2020-08-03 PROCEDURE — G8417 CALC BMI ABV UP PARAM F/U: HCPCS | Performed by: INTERNAL MEDICINE

## 2020-08-03 PROCEDURE — G8400 PT W/DXA NO RESULTS DOC: HCPCS | Performed by: INTERNAL MEDICINE

## 2020-08-03 PROCEDURE — G8427 DOCREV CUR MEDS BY ELIG CLIN: HCPCS | Performed by: INTERNAL MEDICINE

## 2020-08-03 PROCEDURE — 3017F COLORECTAL CA SCREEN DOC REV: CPT | Performed by: INTERNAL MEDICINE

## 2020-08-03 RX ORDER — ALLOPURINOL 100 MG/1
100 TABLET ORAL DAILY
Qty: 90 TABLET | Refills: 3 | Status: SHIPPED | OUTPATIENT
Start: 2020-08-03 | End: 2021-05-18

## 2020-08-03 RX ORDER — PREDNISONE 10 MG/1
TABLET ORAL
Qty: 30 TABLET | Refills: 0 | Status: SHIPPED | OUTPATIENT
Start: 2020-08-03 | End: 2020-08-13

## 2020-08-03 RX ORDER — CANAGLIFLOZIN 100 MG/1
100 TABLET, FILM COATED ORAL
Qty: 30 TABLET | Refills: 11 | Status: SHIPPED | OUTPATIENT
Start: 2020-08-03 | End: 2021-07-13

## 2020-08-03 ASSESSMENT — ENCOUNTER SYMPTOMS
TROUBLE SWALLOWING: 0
RESPIRATORY NEGATIVE: 1
ABDOMINAL PAIN: 0
ABDOMINAL DISTENTION: 1
DIARRHEA: 0

## 2020-08-03 NOTE — ASSESSMENT & PLAN NOTE
Sugars increased-A1c 7.9. She has already met with our dietitian and failed dietary compliance. Status post bypass surgery. Discussed diet. Trying to add SGLT2 medication for her diabetes and hopefully is also helpful weight loss. Continue metformin.

## 2020-08-03 NOTE — PROGRESS NOTES
Neurological:      General: No focal deficit present. Mental Status: She is alert and oriented to person, place, and time. Psychiatric:         Mood and Affect: Mood normal.         Behavior: Behavior normal.         Judgment: Judgment normal.         ASSESSMENT:       Encounter Diagnoses   Name Primary?  Type 2 diabetes mellitus without complication, without long-term current use of insulin (HCC) Yes    Pyuria     Anemia, unspecified type     Acute idiopathic gout involving toe of left foot     Hyperlipidemia, mixed     Essential hypertension     Hypothyroidism, unspecified type     Renal insufficiency     Hyperuricemia        Hyperuricemia  9.5. Start allopurinol under milligrams. Gave prednisone prescription to keep on hand for travel. Hypertension  BP okay    Type 2 diabetes mellitus without complication ([facility])  Sugars increased-A1c 7.9. She has already met with our dietitian and failed dietary compliance. Status post bypass surgery. Discussed diet. Trying to add SGLT2 medication for her diabetes and hopefully is also helpful weight loss. Continue metformin. Renal insufficiency  GFR greater than 60    Hypothyroidism  Continue levothyroxine 100 mcg    Pyuria  Asymptomatic. No Rx at this time.         PLAN:See ASSESSMENT for evaluation & PLAN     Orders Placed This Encounter   Procedures    CBC Auto Differential     Standing Status:   Future     Standing Expiration Date:   8/3/2021    Comprehensive Metabolic Panel     Standing Status:   Future     Standing Expiration Date:   8/3/2021    Lipid Panel     Standing Status:   Future     Standing Expiration Date:   8/3/2021     Order Specific Question:   Is Patient Fasting?/# of Hours     Answer:   yes - 8 hours    Hemoglobin A1C     Standing Status:   Future     Standing Expiration Date:   8/3/2021    TSH without Reflex     Standing Status:   Future     Standing Expiration Date:   8/3/2021    T4, Free     Standing Status:   Future Standing Expiration Date:   8/3/2021    Uric Acid     Standing Status:   Future     Standing Expiration Date:   8/3/2021    Urinalysis Reflex to Culture     Standing Status:   Future     Standing Expiration Date:   8/3/2021     Order Specific Question:   SPECIFY(EX-CATH,MIDSTREAM,CYSTO,ETC)? Answer:   NA     , PMH, SH and FH reviewed and noted. Recent and past labs, tests and consultsalso reviewed. Recent or new meds also reviewed.

## 2020-08-25 RX ORDER — TRIAMTERENE AND HYDROCHLOROTHIAZIDE 37.5; 25 MG/1; MG/1
TABLET ORAL
Qty: 90 TABLET | Refills: 3 | Status: SHIPPED | OUTPATIENT
Start: 2020-08-25 | End: 2021-02-09

## 2020-10-20 NOTE — ASSESSMENT & PLAN NOTE
Off hydrochlorothiazide, she feels better and no orthostatic symptoms. BP slightly elevated todayshconine is having her dog euthanized today. She will monitor BP follow-up in 6 months. See Occupational Health Nurse/MA/Tech visit only.

## 2020-11-17 DIAGNOSIS — E11.9 TYPE 2 DIABETES MELLITUS WITHOUT COMPLICATION, WITHOUT LONG-TERM CURRENT USE OF INSULIN (HCC): ICD-10-CM

## 2020-11-17 DIAGNOSIS — E78.2 HYPERLIPIDEMIA, MIXED: ICD-10-CM

## 2020-11-17 DIAGNOSIS — R82.81 PYURIA: ICD-10-CM

## 2020-11-17 DIAGNOSIS — I10 ESSENTIAL HYPERTENSION: ICD-10-CM

## 2020-11-17 DIAGNOSIS — D64.9 ANEMIA, UNSPECIFIED TYPE: ICD-10-CM

## 2020-11-17 DIAGNOSIS — M10.072 ACUTE IDIOPATHIC GOUT INVOLVING TOE OF LEFT FOOT: ICD-10-CM

## 2020-11-17 DIAGNOSIS — N28.9 RENAL INSUFFICIENCY: ICD-10-CM

## 2020-11-17 DIAGNOSIS — E03.9 HYPOTHYROIDISM, UNSPECIFIED TYPE: ICD-10-CM

## 2020-11-17 LAB
A/G RATIO: 1.6 (ref 1.1–2.2)
ALBUMIN SERPL-MCNC: 3.9 G/DL (ref 3.4–5)
ALP BLD-CCNC: 72 U/L (ref 40–129)
ALT SERPL-CCNC: 12 U/L (ref 10–40)
ANION GAP SERPL CALCULATED.3IONS-SCNC: 13 MMOL/L (ref 3–16)
AST SERPL-CCNC: 17 U/L (ref 15–37)
BASOPHILS ABSOLUTE: 0.1 K/UL (ref 0–0.2)
BASOPHILS RELATIVE PERCENT: 0.7 %
BILIRUB SERPL-MCNC: 0.6 MG/DL (ref 0–1)
BUN BLDV-MCNC: 24 MG/DL (ref 7–20)
CALCIUM SERPL-MCNC: 10.2 MG/DL (ref 8.3–10.6)
CHLORIDE BLD-SCNC: 101 MMOL/L (ref 99–110)
CHOLESTEROL, TOTAL: 153 MG/DL (ref 0–199)
CO2: 28 MMOL/L (ref 21–32)
CREAT SERPL-MCNC: 1 MG/DL (ref 0.6–1.2)
EOSINOPHILS ABSOLUTE: 0.2 K/UL (ref 0–0.6)
EOSINOPHILS RELATIVE PERCENT: 2.6 %
GFR AFRICAN AMERICAN: >60
GFR NON-AFRICAN AMERICAN: 55
GLOBULIN: 2.4 G/DL
GLUCOSE BLD-MCNC: 162 MG/DL (ref 70–99)
HCT VFR BLD CALC: 38.5 % (ref 36–48)
HDLC SERPL-MCNC: 43 MG/DL (ref 40–60)
HEMOGLOBIN: 12 G/DL (ref 12–16)
LDL CHOLESTEROL CALCULATED: 67 MG/DL
LYMPHOCYTES ABSOLUTE: 1.5 K/UL (ref 1–5.1)
LYMPHOCYTES RELATIVE PERCENT: 21.4 %
MCH RBC QN AUTO: 23.3 PG (ref 26–34)
MCHC RBC AUTO-ENTMCNC: 31.3 G/DL (ref 31–36)
MCV RBC AUTO: 74.6 FL (ref 80–100)
MONOCYTES ABSOLUTE: 0.6 K/UL (ref 0–1.3)
MONOCYTES RELATIVE PERCENT: 8.3 %
NEUTROPHILS ABSOLUTE: 4.7 K/UL (ref 1.7–7.7)
NEUTROPHILS RELATIVE PERCENT: 67 %
PDW BLD-RTO: 18.4 % (ref 12.4–15.4)
PLATELET # BLD: 460 K/UL (ref 135–450)
PMV BLD AUTO: 8.7 FL (ref 5–10.5)
POTASSIUM SERPL-SCNC: 4.1 MMOL/L (ref 3.5–5.1)
RBC # BLD: 5.16 M/UL (ref 4–5.2)
SODIUM BLD-SCNC: 142 MMOL/L (ref 136–145)
T4 FREE: 1.5 NG/DL (ref 0.9–1.8)
TOTAL PROTEIN: 6.3 G/DL (ref 6.4–8.2)
TRIGL SERPL-MCNC: 217 MG/DL (ref 0–150)
TSH SERPL DL<=0.05 MIU/L-ACNC: 1.32 UIU/ML (ref 0.27–4.2)
URIC ACID, SERUM: 6.4 MG/DL (ref 2.6–6)
VLDLC SERPL CALC-MCNC: 43 MG/DL
WBC # BLD: 7 K/UL (ref 4–11)

## 2020-11-18 LAB
ESTIMATED AVERAGE GLUCOSE: 165.7 MG/DL
HBA1C MFR BLD: 7.4 %

## 2020-11-23 RX ORDER — FLUCONAZOLE 100 MG/1
100 TABLET ORAL DAILY
Qty: 10 TABLET | Refills: 0 | Status: SHIPPED | OUTPATIENT
Start: 2020-11-23 | End: 2020-12-03

## 2020-12-09 RX ORDER — ROPINIROLE 1 MG/1
TABLET, FILM COATED ORAL
Qty: 90 TABLET | Refills: 1 | Status: SHIPPED | OUTPATIENT
Start: 2020-12-09 | End: 2021-05-18

## 2020-12-09 RX ORDER — METFORMIN HYDROCHLORIDE 500 MG/1
TABLET, EXTENDED RELEASE ORAL
Qty: 90 TABLET | Refills: 3 | Status: SHIPPED | OUTPATIENT
Start: 2020-12-09 | End: 2022-02-09

## 2020-12-09 RX ORDER — ATORVASTATIN CALCIUM 10 MG/1
TABLET, FILM COATED ORAL
Qty: 90 TABLET | Refills: 3 | Status: SHIPPED | OUTPATIENT
Start: 2020-12-09 | End: 2021-11-12

## 2021-02-09 ENCOUNTER — OFFICE VISIT (OUTPATIENT)
Dept: INTERNAL MEDICINE CLINIC | Age: 71
End: 2021-02-09
Payer: MEDICARE

## 2021-02-09 VITALS
TEMPERATURE: 97.5 F | WEIGHT: 233.2 LBS | DIASTOLIC BLOOD PRESSURE: 70 MMHG | SYSTOLIC BLOOD PRESSURE: 116 MMHG | BODY MASS INDEX: 34.54 KG/M2 | HEIGHT: 69 IN

## 2021-02-09 DIAGNOSIS — G47.33 OSA (OBSTRUCTIVE SLEEP APNEA): ICD-10-CM

## 2021-02-09 DIAGNOSIS — E11.9 TYPE 2 DIABETES MELLITUS WITHOUT COMPLICATION, WITHOUT LONG-TERM CURRENT USE OF INSULIN (HCC): ICD-10-CM

## 2021-02-09 DIAGNOSIS — E78.2 HYPERLIPIDEMIA, MIXED: ICD-10-CM

## 2021-02-09 DIAGNOSIS — E03.9 HYPOTHYROIDISM, UNSPECIFIED TYPE: ICD-10-CM

## 2021-02-09 DIAGNOSIS — Z00.00 PREVENTATIVE HEALTH CARE: Primary | ICD-10-CM

## 2021-02-09 DIAGNOSIS — R82.81 PYURIA: ICD-10-CM

## 2021-02-09 DIAGNOSIS — E79.0 HYPERURICEMIA: ICD-10-CM

## 2021-02-09 DIAGNOSIS — Z00.00 ROUTINE GENERAL MEDICAL EXAMINATION AT A HEALTH CARE FACILITY: ICD-10-CM

## 2021-02-09 DIAGNOSIS — F32.A DEPRESSION, UNSPECIFIED DEPRESSION TYPE: ICD-10-CM

## 2021-02-09 DIAGNOSIS — Z95.0 PACEMAKER: ICD-10-CM

## 2021-02-09 DIAGNOSIS — N28.9 RENAL INSUFFICIENCY: ICD-10-CM

## 2021-02-09 DIAGNOSIS — D64.9 ANEMIA, UNSPECIFIED TYPE: ICD-10-CM

## 2021-02-09 DIAGNOSIS — I10 ESSENTIAL HYPERTENSION: ICD-10-CM

## 2021-02-09 PROCEDURE — G0439 PPPS, SUBSEQ VISIT: HCPCS | Performed by: INTERNAL MEDICINE

## 2021-02-09 PROCEDURE — 1090F PRES/ABSN URINE INCON ASSESS: CPT | Performed by: INTERNAL MEDICINE

## 2021-02-09 PROCEDURE — 1036F TOBACCO NON-USER: CPT | Performed by: INTERNAL MEDICINE

## 2021-02-09 PROCEDURE — G8484 FLU IMMUNIZE NO ADMIN: HCPCS | Performed by: INTERNAL MEDICINE

## 2021-02-09 PROCEDURE — 3017F COLORECTAL CA SCREEN DOC REV: CPT | Performed by: INTERNAL MEDICINE

## 2021-02-09 PROCEDURE — 2022F DILAT RTA XM EVC RTNOPTHY: CPT | Performed by: INTERNAL MEDICINE

## 2021-02-09 PROCEDURE — 99214 OFFICE O/P EST MOD 30 MIN: CPT | Performed by: INTERNAL MEDICINE

## 2021-02-09 PROCEDURE — G8417 CALC BMI ABV UP PARAM F/U: HCPCS | Performed by: INTERNAL MEDICINE

## 2021-02-09 PROCEDURE — G8400 PT W/DXA NO RESULTS DOC: HCPCS | Performed by: INTERNAL MEDICINE

## 2021-02-09 PROCEDURE — 3046F HEMOGLOBIN A1C LEVEL >9.0%: CPT | Performed by: INTERNAL MEDICINE

## 2021-02-09 PROCEDURE — 1123F ACP DISCUSS/DSCN MKR DOCD: CPT | Performed by: INTERNAL MEDICINE

## 2021-02-09 PROCEDURE — 4040F PNEUMOC VAC/ADMIN/RCVD: CPT | Performed by: INTERNAL MEDICINE

## 2021-02-09 PROCEDURE — G8427 DOCREV CUR MEDS BY ELIG CLIN: HCPCS | Performed by: INTERNAL MEDICINE

## 2021-02-09 ASSESSMENT — ENCOUNTER SYMPTOMS
RESPIRATORY NEGATIVE: 1
GASTROINTESTINAL NEGATIVE: 1
BACK PAIN: 1

## 2021-02-09 ASSESSMENT — LIFESTYLE VARIABLES: HOW OFTEN DO YOU HAVE A DRINK CONTAINING ALCOHOL: 0

## 2021-02-09 ASSESSMENT — PATIENT HEALTH QUESTIONNAIRE - PHQ9
1. LITTLE INTEREST OR PLEASURE IN DOING THINGS: 0
SUM OF ALL RESPONSES TO PHQ QUESTIONS 1-9: 0
SUM OF ALL RESPONSES TO PHQ QUESTIONS 1-9: 0
SUM OF ALL RESPONSES TO PHQ9 QUESTIONS 1 & 2: 0

## 2021-02-09 NOTE — ASSESSMENT & PLAN NOTE
Placed in 2012she was told recently she needs a new battery within the next several months. Per cardiology.

## 2021-02-09 NOTE — PROGRESS NOTES
SUBJECTIVE:  Patient ID: Sd Cousin is an 79 y.o. female. HPI: Patient here today for the f/u of chronic problems-- see Problem List and associated comments. New issues or complaints include (also see Assessment for more details): Here for general checkup. Overall she is doing very well during the winter. No signs or symptoms of Covid. She has been on Invokana since last summer and doing well. She had 1 incident of vaginitis but no recurrence. She has lost a substantial amount of weight taking the medication. She does feel lightheaded with position change. This is been going on the last couple weeks. She is due for a battery change on her pacemaker. Review of Systems   Constitutional: Negative for fatigue and fever. Eyes: Negative for visual disturbance. Respiratory: Negative. Cardiovascular: Negative. Gastrointestinal: Negative. Genitourinary: Negative. Negative for dysuria and vaginal discharge. Musculoskeletal: Positive for arthralgias and back pain. Allergic/Immunologic: Negative for immunocompromised state. Neurological: Positive for dizziness and light-headedness. Negative for headaches. Psychiatric/Behavioral: Negative for dysphoric mood. OBJECTIVE:    /70 (Site: Right Upper Arm, Position: Sitting)   Temp 97.5 °F (36.4 °C)   Ht 5' 9\" (1.753 m)   Wt 233 lb 3.2 oz (105.8 kg)   BMI 34.44 kg/m²      Physical Exam  Constitutional:       General: She is not in acute distress. Appearance: She is not ill-appearing. Comments: Overweight   HENT:      Head: Normocephalic and atraumatic. Right Ear: External ear normal.      Left Ear: External ear normal.   Eyes:      General: No scleral icterus. Right eye: No discharge. Left eye: No discharge. Extraocular Movements: Extraocular movements intact. Neck:      Vascular: No carotid bruit. Cardiovascular:      Rate and Rhythm: Normal rate and regular rhythm. Pulses:           Carotid pulses are 2+ on the right side and 2+ on the left side. Radial pulses are 2+ on the right side and 2+ on the left side. Posterior tibial pulses are 2+ on the right side and 2+ on the left side. Heart sounds: No murmur. No gallop. Pulmonary:      Effort: Pulmonary effort is normal. No respiratory distress. Breath sounds: Normal breath sounds. No stridor. No wheezing. Abdominal:      General: Bowel sounds are normal. There is no distension or abdominal bruit. Palpations: Abdomen is soft. There is no hepatomegaly, splenomegaly or mass. Musculoskeletal:      Right lower leg: No edema. Left lower leg: No edema. Lymphadenopathy:      Head:      Right side of head: No submandibular adenopathy. Left side of head: No submandibular adenopathy. Cervical: No cervical adenopathy. Upper Body:      Right upper body: No supraclavicular adenopathy. Left upper body: No supraclavicular adenopathy. Skin:     Coloration: Skin is not jaundiced or pale. Neurological:      General: No focal deficit present. Mental Status: She is alert and oriented to person, place, and time. Cranial Nerves: Cranial nerves are intact. Motor: Motor function is intact. No tremor. Coordination: Coordination is intact. Gait: Gait is intact. Psychiatric:         Attention and Perception: Attention normal.         Mood and Affect: Mood normal.         Speech: Speech normal.         Behavior: Behavior normal.         Thought Content: Thought content normal.         Cognition and Memory: Cognition normal.         Judgment: Judgment normal.         ASSESSMENT:       Encounter Diagnoses   Name Primary?     Routine general medical examination at a health care facility Yes    Type 2 diabetes mellitus without complication, without long-term current use of insulin (HCC)     Hypothyroidism, unspecified type     Hyperlipidemia, mixed  Essential hypertension     Renal insufficiency     Anemia, unspecified type     Pyuria     Hyperuricemia     Preventative health care     ADILENE (obstructive sleep apnea)     Depression, unspecified depression type     Pacemaker        Preventative health care  Here for routine checkup. Doing well. Labs reviewed. She is trying to get the Covid vaccine. No signs or symptoms of infection during the pandemic. Hyperuricemia  No gout attacks    Hyperlipidemia, mixed  Lipids okaycontinue Lipitor    Renal insufficiency  Stablecreatinine 1.0 with GFR 55recheck in 2 months    Anemia  Last CBC okay    ADILENE (obstructive sleep apnea)  CPAP    Type 2 diabetes mellitus without complication (HCC)  Last A1c improved to 7.4 on Invokana. This was 3 months ago. She is due for labs again in another 2 to 3 months. Continue Invokana. Noted weight loss of 23 pounds since last summer. Depression  Clinically doing well    Hypertension  She is having some orthostatic symptoms. BP systolic dropped over 20 points today. We will stop triamterene hydrochlorothiazide. Follow-up after labs in a couple months. Hypothyroidism  Continue 100 mcg    Pacemaker  Placed in 2012she was told recently she needs a new battery within the next several months. Per cardiology.         PLAN:See ASSESSMENT for evaluation & PLAN     Orders Placed This Encounter   Procedures    CBC Auto Differential     Standing Status:   Future     Standing Expiration Date:   2/9/2022    Comprehensive Metabolic Panel     Standing Status:   Future     Standing Expiration Date:   2/9/2022    Lipid Panel     Standing Status:   Future     Standing Expiration Date:   2/9/2022     Order Specific Question:   Is Patient Fasting?/# of Hours     Answer:   yes - 8 hours    Hemoglobin A1C     Standing Status:   Future     Standing Expiration Date:   2/9/2022    Urinalysis Reflex to Culture     Standing Status:   Future Standing Expiration Date:   2/9/2022     Order Specific Question:   SPECIFY(EX-CATH,MIDSTREAM,CYSTO,ETC)? Answer:   NA    T4, Free     Standing Status:   Future     Standing Expiration Date:   2/9/2022    TSH without Reflex     Standing Status:   Future     Standing Expiration Date:   2/9/2022    Uric Acid     Standing Status:   Future     Standing Expiration Date:   2/9/2022     , PMH, SH and FH reviewed and noted. Recent and past labs, tests and consultsalso reviewed. Recent or new meds also reviewed.

## 2021-02-09 NOTE — ASSESSMENT & PLAN NOTE
She is having some orthostatic symptoms. BP systolic dropped over 20 points today. We will stop triamterene hydrochlorothiazide. Follow-up after labs in a couple months.

## 2021-02-09 NOTE — ASSESSMENT & PLAN NOTE
Last A1c improved to 7.4 on Invokana. This was 3 months ago. She is due for labs again in another 2 to 3 months. Continue Invokana. Noted weight loss of 23 pounds since last summer.

## 2021-02-09 NOTE — PATIENT INSTRUCTIONS
Personalized Preventive Plan for Maikel Ellis - 2/9/2021  Medicare offers a range of preventive health benefits. Some of the tests and screenings are paid in full while other may be subject to a deductible, co-insurance, and/or copay. Some of these benefits include a comprehensive review of your medical history including lifestyle, illnesses that may run in your family, and various assessments and screenings as appropriate. After reviewing your medical record and screening and assessments performed today your provider may have ordered immunizations, labs, imaging, and/or referrals for you. A list of these orders (if applicable) as well as your Preventive Care list are included within your After Visit Summary for your review. Other Preventive Recommendations:    · A preventive eye exam performed by an eye specialist is recommended every 1-2 years to screen for glaucoma; cataracts, macular degeneration, and other eye disorders. · A preventive dental visit is recommended every 6 months. · Try to get at least 150 minutes of exercise per week or 10,000 steps per day on a pedometer . · Order or download the FREE \"Exercise & Physical Activity: Your Everyday Guide\" from [employer]. Call 4-934.926.7230 or search [employer] online. · You need 5674-4027 mg of calcium and 2170-4708 IU of vitamin D per day. It is possible to meet your calcium requirement with diet alone, but a vitamin D supplement is usually necessary to meet this goal.  · When exposed to the sun, use a sunscreen that protects against both UVA and UVB radiation with an SPF of 30 or greater. Reapply every 2 to 3 hours or after sweating, drying off with a towel, or swimming. · Always wear a seat belt when traveling in a car. Always wear a helmet when riding a bicycle or motorcycle.

## 2021-02-09 NOTE — ASSESSMENT & PLAN NOTE
Here for routine checkup. Doing well. Labs reviewed. She is trying to get the Covid vaccine. No signs or symptoms of infection during the pandemic.

## 2021-02-22 RX ORDER — LEVOTHYROXINE SODIUM 0.1 MG/1
TABLET ORAL
Qty: 90 TABLET | Refills: 3 | Status: SHIPPED | OUTPATIENT
Start: 2021-02-22 | End: 2021-12-09

## 2021-02-22 NOTE — TELEPHONE ENCOUNTER
Pt needs refills on   levothyroxine (SYNTHROID) 100 MCG tablet [[id number]    [facility]/pharmacy #[id number]- [place], [place] - [address] - P [phone] - F [phone]   [address] [facility] [address]   Phone:  [phone]  Fax:  [phone]

## 2021-03-02 ENCOUNTER — OFFICE VISIT (OUTPATIENT)
Dept: INTERNAL MEDICINE CLINIC | Age: 71
End: 2021-03-02
Payer: MEDICARE

## 2021-03-02 VITALS
WEIGHT: 242.8 LBS | TEMPERATURE: 97.9 F | SYSTOLIC BLOOD PRESSURE: 146 MMHG | BODY MASS INDEX: 35.96 KG/M2 | HEIGHT: 69 IN | DIASTOLIC BLOOD PRESSURE: 72 MMHG

## 2021-03-02 DIAGNOSIS — G25.81 RESTLESS LEG SYNDROME: ICD-10-CM

## 2021-03-02 DIAGNOSIS — E11.9 TYPE 2 DIABETES MELLITUS WITHOUT COMPLICATION, WITHOUT LONG-TERM CURRENT USE OF INSULIN (HCC): ICD-10-CM

## 2021-03-02 DIAGNOSIS — I10 ESSENTIAL HYPERTENSION: ICD-10-CM

## 2021-03-02 DIAGNOSIS — I95.1 ORTHOSTATIC HYPOTENSION: ICD-10-CM

## 2021-03-02 PROCEDURE — 4040F PNEUMOC VAC/ADMIN/RCVD: CPT | Performed by: INTERNAL MEDICINE

## 2021-03-02 PROCEDURE — 1123F ACP DISCUSS/DSCN MKR DOCD: CPT | Performed by: INTERNAL MEDICINE

## 2021-03-02 PROCEDURE — 1036F TOBACCO NON-USER: CPT | Performed by: INTERNAL MEDICINE

## 2021-03-02 PROCEDURE — G8427 DOCREV CUR MEDS BY ELIG CLIN: HCPCS | Performed by: INTERNAL MEDICINE

## 2021-03-02 PROCEDURE — G8484 FLU IMMUNIZE NO ADMIN: HCPCS | Performed by: INTERNAL MEDICINE

## 2021-03-02 PROCEDURE — 2022F DILAT RTA XM EVC RTNOPTHY: CPT | Performed by: INTERNAL MEDICINE

## 2021-03-02 PROCEDURE — 99214 OFFICE O/P EST MOD 30 MIN: CPT | Performed by: INTERNAL MEDICINE

## 2021-03-02 PROCEDURE — 1090F PRES/ABSN URINE INCON ASSESS: CPT | Performed by: INTERNAL MEDICINE

## 2021-03-02 PROCEDURE — 3017F COLORECTAL CA SCREEN DOC REV: CPT | Performed by: INTERNAL MEDICINE

## 2021-03-02 PROCEDURE — G8400 PT W/DXA NO RESULTS DOC: HCPCS | Performed by: INTERNAL MEDICINE

## 2021-03-02 PROCEDURE — 3046F HEMOGLOBIN A1C LEVEL >9.0%: CPT | Performed by: INTERNAL MEDICINE

## 2021-03-02 PROCEDURE — G8417 CALC BMI ABV UP PARAM F/U: HCPCS | Performed by: INTERNAL MEDICINE

## 2021-03-02 RX ORDER — TRIAMTERENE AND HYDROCHLOROTHIAZIDE 37.5; 25 MG/1; MG/1
TABLET ORAL
Qty: 45 TABLET | Refills: 3 | Status: SHIPPED | OUTPATIENT
Start: 2021-03-02 | End: 2021-12-09

## 2021-03-02 ASSESSMENT — PATIENT HEALTH QUESTIONNAIRE - PHQ9
SUM OF ALL RESPONSES TO PHQ QUESTIONS 1-9: 0
2. FEELING DOWN, DEPRESSED OR HOPELESS: 0
SUM OF ALL RESPONSES TO PHQ QUESTIONS 1-9: 0

## 2021-03-02 ASSESSMENT — ENCOUNTER SYMPTOMS: SHORTNESS OF BREATH: 1

## 2021-03-02 NOTE — ASSESSMENT & PLAN NOTE
BP up slightly since stopping the Maxide. The orthostatic symptoms have resolved. Some edema. Will restart Maxide 25 at 1/2 tablet daily. Monitor BP and swelling. Labs in 2 months.

## 2021-03-02 NOTE — PROGRESS NOTES
SUBJECTIVE:  Patient ID: Skyler King is an 79 y.o. female. HPI: Patient here today for the f/u of chronic problems-- see Problem List and associated comments. New issues or complaints include (also see Assessment for more details): Here for follow-up after the Maxide was held. The lightheadedness cleared but her blood pressure is going up and she has had occasional edema in her lower extremities. She recently saw cardiology and she may need a battery change in her pacer later this year. Her other medications are all stable and the same. Review of Systems   Constitutional: Negative for fatigue. Respiratory: Positive for shortness of breath (Stable). No PND or orthopnea   Cardiovascular: Positive for leg swelling. OBJECTIVE:    BP (!) 146/72 (Site: Left Upper Arm)   Temp 97.9 °F (36.6 °C)   Ht 5' 9\" (1.753 m)   Wt 242 lb 12.8 oz (110.1 kg)   BMI 35.86 kg/m²      Physical Exam  Constitutional:       General: She is not in acute distress. Appearance: She is not ill-appearing. Eyes:      Extraocular Movements: Extraocular movements intact. Cardiovascular:      Rate and Rhythm: Normal rate and regular rhythm. Pulmonary:      Effort: Pulmonary effort is normal. No respiratory distress. Breath sounds: Normal breath sounds. Musculoskeletal:      Right lower leg: Edema (Nonpitting ankle edema) present. Left lower leg: Edema (Nonpitting ankle edema) present. Neurological:      General: No focal deficit present. Mental Status: She is oriented to person, place, and time. Psychiatric:         Mood and Affect: Mood normal.         Thought Content: Thought content normal.         Judgment: Judgment normal.         ASSESSMENT:       Encounter Diagnoses   Name Primary?     Restless leg syndrome     Type 2 diabetes mellitus without complication, without long-term current use of insulin (HCC)     Essential hypertension     Orthostatic hypotension Restless leg syndrome  Continue ropiniroleworking well    Type 2 diabetes mellitus without complication ([facility]  Continue Invokanashe is able to get covered at $47 per month. Hypertension  BP up slightly since stopping the Maxide. The orthostatic symptoms have resolved. Some edema. Will restart Maxide 25 at 1/2 tablet daily. Monitor BP and swelling. Labs in 2 months. Orthostatic hypotension  Symptoms clinically resolved off Maxide. However BP is going up and some water retention. Will start 1/2 tablet of Maxide daily. PLAN:See ASSESSMENT for evaluation & PLAN     No orders of the defined types were placed in this encounter.    , PMH, SH and FH reviewed and noted. Recent and past labs, tests and consultsalso reviewed. Recent or new meds also reviewed.

## 2021-03-02 NOTE — ASSESSMENT & PLAN NOTE
Symptoms clinically resolved off Maxide. However BP is going up and some water retention. Will start 1/2 tablet of Maxide daily.

## 2021-03-08 ENCOUNTER — IMMUNIZATION (OUTPATIENT)
Dept: FAMILY MEDICINE CLINIC | Age: 71
End: 2021-03-08
Payer: MEDICARE

## 2021-03-08 PROCEDURE — 0001A COVID-19, PFIZER VACCINE 30MCG/0.3ML DOSE: CPT | Performed by: FAMILY MEDICINE

## 2021-03-08 PROCEDURE — 91300 COVID-19, PFIZER VACCINE 30MCG/0.3ML DOSE: CPT | Performed by: FAMILY MEDICINE

## 2021-03-11 PROBLEM — Z00.00 PREVENTATIVE HEALTH CARE: Status: RESOLVED | Noted: 2018-01-29 | Resolved: 2021-03-11

## 2021-03-29 ENCOUNTER — IMMUNIZATION (OUTPATIENT)
Dept: FAMILY MEDICINE CLINIC | Age: 71
End: 2021-03-29
Payer: MEDICARE

## 2021-03-29 PROCEDURE — 0002A COVID-19, PFIZER VACCINE 30MCG/0.3ML DOSE: CPT | Performed by: NURSE PRACTITIONER

## 2021-03-29 PROCEDURE — 91300 COVID-19, PFIZER VACCINE 30MCG/0.3ML DOSE: CPT | Performed by: NURSE PRACTITIONER

## 2021-05-03 DIAGNOSIS — N28.9 RENAL INSUFFICIENCY: ICD-10-CM

## 2021-05-03 DIAGNOSIS — E11.9 TYPE 2 DIABETES MELLITUS WITHOUT COMPLICATION, WITHOUT LONG-TERM CURRENT USE OF INSULIN (HCC): ICD-10-CM

## 2021-05-03 DIAGNOSIS — D64.9 ANEMIA, UNSPECIFIED TYPE: ICD-10-CM

## 2021-05-03 DIAGNOSIS — E03.9 HYPOTHYROIDISM, UNSPECIFIED TYPE: ICD-10-CM

## 2021-05-03 DIAGNOSIS — E78.2 HYPERLIPIDEMIA, MIXED: ICD-10-CM

## 2021-05-03 DIAGNOSIS — E79.0 HYPERURICEMIA: ICD-10-CM

## 2021-05-03 DIAGNOSIS — R82.81 PYURIA: ICD-10-CM

## 2021-05-03 DIAGNOSIS — I10 ESSENTIAL HYPERTENSION: ICD-10-CM

## 2021-05-04 LAB
A/G RATIO: 2 (ref 1.1–2.2)
ALBUMIN SERPL-MCNC: 4.4 G/DL (ref 3.4–5)
ALP BLD-CCNC: 78 U/L (ref 40–129)
ALT SERPL-CCNC: 11 U/L (ref 10–40)
ANION GAP SERPL CALCULATED.3IONS-SCNC: 17 MMOL/L (ref 3–16)
AST SERPL-CCNC: 18 U/L (ref 15–37)
BASOPHILS ABSOLUTE: 0.1 K/UL (ref 0–0.2)
BASOPHILS RELATIVE PERCENT: 0.9 %
BILIRUB SERPL-MCNC: 0.6 MG/DL (ref 0–1)
BILIRUBIN URINE: ABNORMAL
BLOOD, URINE: NEGATIVE
BUN BLDV-MCNC: 28 MG/DL (ref 7–20)
CALCIUM SERPL-MCNC: 9.5 MG/DL (ref 8.3–10.6)
CHLORIDE BLD-SCNC: 97 MMOL/L (ref 99–110)
CHOLESTEROL, TOTAL: 148 MG/DL (ref 0–199)
CLARITY: CLEAR
CO2: 24 MMOL/L (ref 21–32)
COLOR: YELLOW
CREAT SERPL-MCNC: 0.8 MG/DL (ref 0.6–1.2)
EOSINOPHILS ABSOLUTE: 0.2 K/UL (ref 0–0.6)
EOSINOPHILS RELATIVE PERCENT: 1.8 %
EPITHELIAL CELLS, UA: 5 /HPF (ref 0–5)
ESTIMATED AVERAGE GLUCOSE: 151.3 MG/DL
GFR AFRICAN AMERICAN: >60
GFR NON-AFRICAN AMERICAN: >60
GLOBULIN: 2.2 G/DL
GLUCOSE BLD-MCNC: 108 MG/DL (ref 70–99)
GLUCOSE URINE: >=1000 MG/DL
HBA1C MFR BLD: 6.9 %
HCT VFR BLD CALC: 39.3 % (ref 36–48)
HDLC SERPL-MCNC: 42 MG/DL (ref 40–60)
HEMOGLOBIN: 12.4 G/DL (ref 12–16)
HYALINE CASTS: 4 /LPF (ref 0–8)
KETONES, URINE: NEGATIVE MG/DL
LDL CHOLESTEROL CALCULATED: 66 MG/DL
LEUKOCYTE ESTERASE, URINE: ABNORMAL
LYMPHOCYTES ABSOLUTE: 1.9 K/UL (ref 1–5.1)
LYMPHOCYTES RELATIVE PERCENT: 18.3 %
MCH RBC QN AUTO: 23.7 PG (ref 26–34)
MCHC RBC AUTO-ENTMCNC: 31.5 G/DL (ref 31–36)
MCV RBC AUTO: 75.3 FL (ref 80–100)
MICROSCOPIC EXAMINATION: YES
MONOCYTES ABSOLUTE: 0.8 K/UL (ref 0–1.3)
MONOCYTES RELATIVE PERCENT: 8.1 %
NEUTROPHILS ABSOLUTE: 7.4 K/UL (ref 1.7–7.7)
NEUTROPHILS RELATIVE PERCENT: 70.9 %
NITRITE, URINE: NEGATIVE
PDW BLD-RTO: 19.6 % (ref 12.4–15.4)
PH UA: 6 (ref 5–8)
PLATELET # BLD: 416 K/UL (ref 135–450)
PMV BLD AUTO: 8.9 FL (ref 5–10.5)
POTASSIUM SERPL-SCNC: 4.1 MMOL/L (ref 3.5–5.1)
PROTEIN UA: NEGATIVE MG/DL
RBC # BLD: 5.22 M/UL (ref 4–5.2)
RBC UA: 2 /HPF (ref 0–4)
SODIUM BLD-SCNC: 138 MMOL/L (ref 136–145)
SPECIFIC GRAVITY UA: 1.03 (ref 1–1.03)
T4 FREE: 1.8 NG/DL (ref 0.9–1.8)
TOTAL PROTEIN: 6.6 G/DL (ref 6.4–8.2)
TRIGL SERPL-MCNC: 198 MG/DL (ref 0–150)
TSH SERPL DL<=0.05 MIU/L-ACNC: 2.27 UIU/ML (ref 0.27–4.2)
URIC ACID, SERUM: 4.7 MG/DL (ref 2.6–6)
URINE REFLEX TO CULTURE: ABNORMAL
URINE TYPE: ABNORMAL
UROBILINOGEN, URINE: 1 E.U./DL
VLDLC SERPL CALC-MCNC: 40 MG/DL
WBC # BLD: 10.5 K/UL (ref 4–11)
WBC UA: 1 /HPF (ref 0–5)

## 2021-05-18 RX ORDER — ALLOPURINOL 100 MG/1
TABLET ORAL
Qty: 90 TABLET | Refills: 3 | Status: SHIPPED | OUTPATIENT
Start: 2021-05-18 | End: 2022-07-21

## 2021-05-18 RX ORDER — ROPINIROLE 1 MG/1
TABLET, FILM COATED ORAL
Qty: 90 TABLET | Refills: 1 | Status: SHIPPED | OUTPATIENT
Start: 2021-05-18 | End: 2021-11-11

## 2021-07-13 DIAGNOSIS — E11.9 TYPE 2 DIABETES MELLITUS WITHOUT COMPLICATION, WITHOUT LONG-TERM CURRENT USE OF INSULIN (HCC): ICD-10-CM

## 2021-07-13 RX ORDER — CANAGLIFLOZIN 100 MG/1
TABLET, FILM COATED ORAL
Qty: 30 TABLET | Refills: 2 | Status: SHIPPED | OUTPATIENT
Start: 2021-07-13 | End: 2021-10-06

## 2021-07-30 DIAGNOSIS — N64.4 BREAST PAIN: Primary | ICD-10-CM

## 2021-08-24 ENCOUNTER — TELEPHONE (OUTPATIENT)
Dept: INTERNAL MEDICINE CLINIC | Age: 71
End: 2021-08-24

## 2021-08-26 ENCOUNTER — HOSPITAL ENCOUNTER (OUTPATIENT)
Age: 71
Setting detail: OUTPATIENT SURGERY
Discharge: HOME | End: 2021-08-26
Attending: PAIN MEDICINE | Admitting: PAIN MEDICINE
Payer: MEDICARE

## 2021-08-26 VITALS
DIASTOLIC BLOOD PRESSURE: 71 MMHG | BODY MASS INDEX: 34.21 KG/M2 | SYSTOLIC BLOOD PRESSURE: 137 MMHG | WEIGHT: 231 LBS | TEMPERATURE: 97.9 F | OXYGEN SATURATION: 97 % | HEART RATE: 60 BPM | RESPIRATION RATE: 18 BRPM | HEIGHT: 69 IN

## 2021-08-26 LAB
GLUCOSE BLD-MCNC: 126 MG/DL (ref 70–99)
PERFORMED ON: ABNORMAL

## 2021-08-26 PROCEDURE — 7100000010 HC PHASE II RECOVERY - FIRST 15 MIN: Performed by: PAIN MEDICINE

## 2021-08-26 PROCEDURE — 2500000003 HC RX 250 WO HCPCS

## 2021-08-26 PROCEDURE — 64635 DESTROY LUMB/SAC FACET JNT: CPT | Performed by: PAIN MEDICINE

## 2021-08-26 PROCEDURE — 77003 FLUOROGUIDE FOR SPINE INJECT: CPT | Performed by: PAIN MEDICINE

## 2021-08-26 PROCEDURE — 64636 DESTROY L/S FACET JNT ADDL: CPT | Performed by: PAIN MEDICINE

## 2021-08-26 PROCEDURE — 2709999900 HC NON-CHARGEABLE SUPPLY: Performed by: PAIN MEDICINE

## 2021-08-26 PROCEDURE — 99152 MOD SED SAME PHYS/QHP 5/>YRS: CPT | Performed by: PAIN MEDICINE

## 2021-08-26 PROCEDURE — 6360000002 HC RX W HCPCS: Performed by: PAIN MEDICINE

## 2021-08-26 PROCEDURE — 3600000012 HC SURGERY LEVEL 2 ADDTL 15MIN: Performed by: PAIN MEDICINE

## 2021-08-26 PROCEDURE — 99153 MOD SED SAME PHYS/QHP EA: CPT | Performed by: PAIN MEDICINE

## 2021-08-26 PROCEDURE — 7100000011 HC PHASE II RECOVERY - ADDTL 15 MIN: Performed by: PAIN MEDICINE

## 2021-08-26 PROCEDURE — 2580000003 HC RX 258: Performed by: PAIN MEDICINE

## 2021-08-26 PROCEDURE — 3600000002 HC SURGERY LEVEL 2 BASE: Performed by: PAIN MEDICINE

## 2021-08-26 PROCEDURE — 2500000003 HC RX 250 WO HCPCS: Performed by: PAIN MEDICINE

## 2021-08-26 RX ORDER — MIDAZOLAM HYDROCHLORIDE 1 MG/ML
INJECTION INTRAMUSCULAR; INTRAVENOUS PRN
Status: DISCONTINUED | OUTPATIENT
Start: 2021-08-26 | End: 2021-08-26 | Stop reason: ALTCHOICE

## 2021-08-26 RX ORDER — FENTANYL CITRATE 50 UG/ML
INJECTION, SOLUTION INTRAMUSCULAR; INTRAVENOUS PRN
Status: DISCONTINUED | OUTPATIENT
Start: 2021-08-26 | End: 2021-08-26 | Stop reason: ALTCHOICE

## 2021-08-26 RX ORDER — LIDOCAINE HYDROCHLORIDE 20 MG/ML
INJECTION, SOLUTION EPIDURAL; INFILTRATION; INTRACAUDAL; PERINEURAL
Status: DISCONTINUED
Start: 2021-08-26 | End: 2021-08-26 | Stop reason: HOSPADM

## 2021-08-26 RX ORDER — LIDOCAINE HYDROCHLORIDE 20 MG/ML
INJECTION, SOLUTION INFILTRATION; PERINEURAL PRN
Status: DISCONTINUED | OUTPATIENT
Start: 2021-08-26 | End: 2021-08-26 | Stop reason: ALTCHOICE

## 2021-08-26 RX ORDER — SODIUM CHLORIDE, SODIUM LACTATE, POTASSIUM CHLORIDE, CALCIUM CHLORIDE 600; 310; 30; 20 MG/100ML; MG/100ML; MG/100ML; MG/100ML
INJECTION, SOLUTION INTRAVENOUS CONTINUOUS
Status: DISCONTINUED | OUTPATIENT
Start: 2021-08-26 | End: 2021-08-26 | Stop reason: HOSPADM

## 2021-08-26 RX ORDER — LIDOCAINE HYDROCHLORIDE 10 MG/ML
INJECTION, SOLUTION EPIDURAL; INFILTRATION; INTRACAUDAL; PERINEURAL
Status: COMPLETED
Start: 2021-08-26 | End: 2021-08-26

## 2021-08-26 RX ORDER — MIDAZOLAM HYDROCHLORIDE 1 MG/ML
INJECTION INTRAMUSCULAR; INTRAVENOUS
Status: DISCONTINUED
Start: 2021-08-26 | End: 2021-08-26 | Stop reason: HOSPADM

## 2021-08-26 RX ORDER — LIDOCAINE HYDROCHLORIDE 10 MG/ML
INJECTION, SOLUTION EPIDURAL; INFILTRATION; INTRACAUDAL; PERINEURAL PRN
Status: DISCONTINUED | OUTPATIENT
Start: 2021-08-26 | End: 2021-08-26 | Stop reason: ALTCHOICE

## 2021-08-26 RX ORDER — FENTANYL CITRATE 50 UG/ML
INJECTION, SOLUTION INTRAMUSCULAR; INTRAVENOUS
Status: DISCONTINUED
Start: 2021-08-26 | End: 2021-08-26 | Stop reason: HOSPADM

## 2021-08-26 RX ADMIN — SODIUM CHLORIDE, POTASSIUM CHLORIDE, SODIUM LACTATE AND CALCIUM CHLORIDE: 600; 310; 30; 20 INJECTION, SOLUTION INTRAVENOUS at 13:38

## 2021-08-26 RX ADMIN — LIDOCAINE HYDROCHLORIDE 0.1 ML: 10 INJECTION, SOLUTION EPIDURAL; INFILTRATION; INTRACAUDAL; PERINEURAL at 13:37

## 2021-08-26 ASSESSMENT — PAIN - FUNCTIONAL ASSESSMENT
PAIN_FUNCTIONAL_ASSESSMENT: 0-10
PAIN_FUNCTIONAL_ASSESSMENT: PREVENTS OR INTERFERES SOME ACTIVE ACTIVITIES AND ADLS

## 2021-08-26 NOTE — PROCEDURES
Sanna Merlin is a 79 y.o. female patient. No diagnosis found. Past Medical History:   Diagnosis Date    Asthma     CAD (coronary artery disease)     nonobstructive    [facility]     Hyperlipidemia     Hypertension     Hypothyroidism     Obesity 2010    bypass    ADILENE (obstructive sleep apnea)     CPAP    Restless leg syndrome     SSS (sick sinus syndrome) ([facility] 2012    dual chamber pacer - [facility]     Blood pressure 130/67, pulse 60, temperature 97.6 °F (36.4 °C), temperature source Temporal, resp. rate 20, height 5' 9\" (1.753 m), weight 231 lb (104.8 kg), SpO2 97 %. Jon Cox MD  8/26/2021  DICTATING PHYSICIAN: Barak Dial M.D        PREOPERATIVE DIAGNOSES: Lumbar Spondylosis 721.3, M47.816, M47.817       POSTOPERATIVE DIAGNOSES: Lumbar Spondylosis       OPERATIVE PROCEDURES:  Radiofrequency Neurotomy of medial branches at _B L3,L4,L5__ levels to block the L45 and L5S1 facet joints. 40129 and 64636 X 1 WITH 87895,  BILATERAL   SURGEON: Barak Dial M.D   INDICATIONS:  Lumbar Spondylosis  OPERATIVE PROCEDURE:  Consent signed by patient after risks and benefits explained. Patient was in prone position. Back was prepped with Prevail and draped. Aseptic technique used at every stage of the procedure. Skin wheal with 1% Lidocaine with 30-gauge needle. A _18__ -gauge, _100__ -mm, curved tip radiofrequency needle with _10__ -mm active tip was placed under fluoroscopic guidance using A.P. views at the junction of superior articular process and sacral ala on the _B__ sides to access the __L5__ medial branches. The needle was walked anteriorly and superiorly by couple of millimeters. Then the lateral view was checked to make sure the tip of the needle is at the posterior part of the spine, clearly posterior to the posterior margin of the neural foramen at that level by at least couple of millimeters.  Sensory stimulation at 50 hertz at 0.5 v did not create any sensation in the legs. Motor stimulation at 2 hertz at 2 volts did not produce any muscle contraction in the _B__ legs. Sensory stimulation at 50hz at 0.5v did not create any pain down the leg. 0.3 cc of 1% lidocaine was injected and radiofrequency lesioning was done for 90 seconds at 80 degree centigrade. Needle pulled out intact. Exact similar procedure was performed at the junction of superior articular process and transverse process at _B L4,L5__ vertebral body levels to burn the __B L3,L4__ medial branches. Paraspinal muscle twitching was seen at _B L4,L5 __ vertebral levels. Patient did not feel radicular pain either during needle placement or during lesioning. Patient tolerated the procedure well. Intravenous sedation was with _100  mcg of Fentanyl and _2__ mg of Versed. ESTIMATED BLOOD LOSS:  Less than 1 cc      Patient was monitored and stable. Discharge instructions were given.

## 2021-08-26 NOTE — PROGRESS NOTES
Discharge  instructions reviewed. Pt and family verbalize understanding with no further questions. VSS. Pt discharged via [facility] to car. Assessment unchanged.

## 2021-08-30 ENCOUNTER — OFFICE VISIT (OUTPATIENT)
Dept: INTERNAL MEDICINE CLINIC | Age: 71
End: 2021-08-30
Payer: MEDICARE

## 2021-08-30 VITALS
HEIGHT: 69 IN | BODY MASS INDEX: 34.27 KG/M2 | WEIGHT: 231.4 LBS | DIASTOLIC BLOOD PRESSURE: 78 MMHG | SYSTOLIC BLOOD PRESSURE: 138 MMHG

## 2021-08-30 DIAGNOSIS — B36.9 FUNGAL DERMATITIS: Primary | ICD-10-CM

## 2021-08-30 PROCEDURE — 1123F ACP DISCUSS/DSCN MKR DOCD: CPT | Performed by: INTERNAL MEDICINE

## 2021-08-30 PROCEDURE — 4040F PNEUMOC VAC/ADMIN/RCVD: CPT | Performed by: INTERNAL MEDICINE

## 2021-08-30 PROCEDURE — G8417 CALC BMI ABV UP PARAM F/U: HCPCS | Performed by: INTERNAL MEDICINE

## 2021-08-30 PROCEDURE — G8427 DOCREV CUR MEDS BY ELIG CLIN: HCPCS | Performed by: INTERNAL MEDICINE

## 2021-08-30 PROCEDURE — G8400 PT W/DXA NO RESULTS DOC: HCPCS | Performed by: INTERNAL MEDICINE

## 2021-08-30 PROCEDURE — 99213 OFFICE O/P EST LOW 20 MIN: CPT | Performed by: INTERNAL MEDICINE

## 2021-08-30 PROCEDURE — 1090F PRES/ABSN URINE INCON ASSESS: CPT | Performed by: INTERNAL MEDICINE

## 2021-08-30 PROCEDURE — 1036F TOBACCO NON-USER: CPT | Performed by: INTERNAL MEDICINE

## 2021-08-30 PROCEDURE — 3017F COLORECTAL CA SCREEN DOC REV: CPT | Performed by: INTERNAL MEDICINE

## 2021-08-30 RX ORDER — NYSTATIN 100000 [USP'U]/G
POWDER TOPICAL
Qty: 60 G | Refills: 3 | Status: SHIPPED | OUTPATIENT
Start: 2021-08-30 | End: 2022-07-12 | Stop reason: ALTCHOICE

## 2021-08-30 RX ORDER — HYDROCODONE BITARTRATE AND ACETAMINOPHEN 5; 325 MG/1; MG/1
1 TABLET ORAL 2 TIMES DAILY
COMMUNITY
End: 2022-04-08

## 2021-08-30 RX ORDER — KETOCONAZOLE 20 MG/G
CREAM TOPICAL
Qty: 30 G | Refills: 1 | Status: SHIPPED | OUTPATIENT
Start: 2021-08-30

## 2021-08-30 SDOH — ECONOMIC STABILITY: FOOD INSECURITY: WITHIN THE PAST 12 MONTHS, YOU WORRIED THAT YOUR FOOD WOULD RUN OUT BEFORE YOU GOT MONEY TO BUY MORE.: NEVER TRUE

## 2021-08-30 SDOH — ECONOMIC STABILITY: FOOD INSECURITY: WITHIN THE PAST 12 MONTHS, THE FOOD YOU BOUGHT JUST DIDN'T LAST AND YOU DIDN'T HAVE MONEY TO GET MORE.: NEVER TRUE

## 2021-08-30 ASSESSMENT — ENCOUNTER SYMPTOMS
ABDOMINAL PAIN: 0
SHORTNESS OF BREATH: 0
BACK PAIN: 0
NAUSEA: 0
COUGH: 0
EYE REDNESS: 0
CHEST TIGHTNESS: 0

## 2021-08-30 ASSESSMENT — PATIENT HEALTH QUESTIONNAIRE - PHQ9
2. FEELING DOWN, DEPRESSED OR HOPELESS: 0
SUM OF ALL RESPONSES TO PHQ QUESTIONS 1-9: 0
1. LITTLE INTEREST OR PLEASURE IN DOING THINGS: 0
SUM OF ALL RESPONSES TO PHQ QUESTIONS 1-9: 0
SUM OF ALL RESPONSES TO PHQ QUESTIONS 1-9: 0
SUM OF ALL RESPONSES TO PHQ9 QUESTIONS 1 & 2: 0

## 2021-08-30 ASSESSMENT — SOCIAL DETERMINANTS OF HEALTH (SDOH): HOW HARD IS IT FOR YOU TO PAY FOR THE VERY BASICS LIKE FOOD, HOUSING, MEDICAL CARE, AND HEATING?: NOT HARD AT ALL

## 2021-08-30 NOTE — PROGRESS NOTES
Subjective:      Patient ID: Coby Caldwell is a 79 y.o. female    Chief Complaint   Patient presents with    Rash     under left breast off & on x 1 week slight burn        Rash  The affected locations include the chest. The rash is characterized by burning, itchiness, redness and scaling. She was exposed to nothing. Pertinent negatives include no congestion, cough, fatigue, fever or shortness of breath. Past treatments include moisturizer and anti-itch cream. The treatment provided no relief. There is no history of allergies, asthma, eczema or varicella. Current Outpatient Medications on File Prior to Visit   Medication Sig Dispense Refill    HYDROcodone-acetaminophen (NORCO) 5-325 MG per tablet Take 1 tablet by mouth 2 times daily.  INVOKANA 100 MG TABS tablet TAKE 1 TABLET BY MOUTH EVERY DAY IN THE MORNING BEFORE BREAKFAST 30 tablet 2    rOPINIRole (REQUIP) 1 MG tablet TAKE 1 TABLET BY MOUTH EVERY DAY 90 tablet 1    allopurinol (ZYLOPRIM) 100 MG tablet TAKE 1 TABLET BY MOUTH EVERY DAY 90 tablet 3    triamterene-hydroCHLOROthiazide (MAXZIDE-25) 37.5-25 MG per tablet 1/2 QD 45 tablet 3    levothyroxine (SYNTHROID) 100 MCG tablet TAKE 1 TABLET EVERY DAY 90 tablet 3    albuterol sulfate  (90 Base) MCG/ACT inhaler TAKE 2 PUFFS BY MOUTH EVERY 6 HOURS AS NEEDED FOR WHEEZE 6.7 Inhaler 2    atorvastatin (LIPITOR) 10 MG tablet TAKE 1 TABLET BY MOUTH EVERY DAY 90 tablet 3    metFORMIN (GLUCOPHAGE-XR) 500 MG extended release tablet TAKE 1 TABLET EVERY DAY 90 tablet 3     No current facility-administered medications on file prior to visit. No Known Allergies    Review of Systems   Constitutional: Negative for fatigue, fever and unexpected weight change. HENT: Negative for congestion and hearing loss. Eyes: Negative for redness and visual disturbance. Respiratory: Negative for cough, chest tightness and shortness of breath. Cardiovascular: Negative for chest pain and leg swelling. Gastrointestinal: Negative for abdominal pain and nausea. Endocrine: Negative for polydipsia and polyuria. Genitourinary: Negative for dysuria and frequency. Musculoskeletal: Negative for arthralgias, back pain and neck pain. Skin: Positive for rash. Negative for wound. Allergic/Immunologic: Negative for environmental allergies. Neurological: Negative for dizziness, weakness and headaches. Hematological: Negative for adenopathy. Does not bruise/bleed easily. Psychiatric/Behavioral: Negative for sleep disturbance. The patient is not nervous/anxious. Objective:   Physical Exam  Constitutional:       Appearance: Normal appearance. Eyes:      Extraocular Movements: Extraocular movements intact. Cardiovascular:      Rate and Rhythm: Normal rate and regular rhythm. Pulmonary:      Effort: Pulmonary effort is normal.      Breath sounds: No wheezing or rales. Skin:     Findings: Rash present. Comments: Likely fungal dermatitis under the left breast    Neurological:      Mental Status: She is alert and oriented to person, place, and time. Psychiatric:         Mood and Affect: Mood normal.         Assessment and plan       1. Fungal dermatitis  Topical fungal dermatitis. Try antifungal powder. Call if not improving.  - nystatin (MYCOSTATIN) 887018 UNIT/GM powder; Apply 3 times daily. Dispense: 60 g; Refill: 3  - ketoconazole (NIZORAL) 2 % cream; Apply topically daily.   Dispense: 30 g; Refill: 1

## 2021-09-15 ENCOUNTER — APPOINTMENT (RX ONLY)
Dept: URBAN - METROPOLITAN AREA CLINIC 170 | Facility: CLINIC | Age: 71
Setting detail: DERMATOLOGY
End: 2021-09-15

## 2021-09-15 DIAGNOSIS — Z85.820 PERSONAL HISTORY OF MALIGNANT MELANOMA OF SKIN: ICD-10-CM

## 2021-09-15 DIAGNOSIS — D18.0 HEMANGIOMA: ICD-10-CM

## 2021-09-15 DIAGNOSIS — L30.4 ERYTHEMA INTERTRIGO: ICD-10-CM

## 2021-09-15 DIAGNOSIS — D22 MELANOCYTIC NEVI: ICD-10-CM

## 2021-09-15 DIAGNOSIS — L82.1 OTHER SEBORRHEIC KERATOSIS: ICD-10-CM

## 2021-09-15 DIAGNOSIS — L81.4 OTHER MELANIN HYPERPIGMENTATION: ICD-10-CM

## 2021-09-15 PROBLEM — D22.5 MELANOCYTIC NEVI OF TRUNK: Status: ACTIVE | Noted: 2021-09-15

## 2021-09-15 PROBLEM — D18.01 HEMANGIOMA OF SKIN AND SUBCUTANEOUS TISSUE: Status: ACTIVE | Noted: 2021-09-15

## 2021-09-15 PROCEDURE — ? PRESCRIPTION

## 2021-09-15 PROCEDURE — ? TREATMENT REGIMEN

## 2021-09-15 PROCEDURE — ? COUNSELING

## 2021-09-15 PROCEDURE — ? ADDITIONAL NOTES

## 2021-09-15 PROCEDURE — 99214 OFFICE O/P EST MOD 30 MIN: CPT

## 2021-09-15 PROCEDURE — ? FULL BODY SKIN EXAM

## 2021-09-15 RX ORDER — HYDROCORTISONE 25 MG/G
OINTMENT TOPICAL BID
Qty: 28.35 | Refills: 2 | Status: ERX | COMMUNITY
Start: 2021-09-15

## 2021-09-15 RX ORDER — KETOCONAZOLE 20 MG/G
CREAM TOPICAL
Qty: 60 | Refills: 3 | Status: ERX | COMMUNITY
Start: 2021-09-15

## 2021-09-15 RX ADMIN — KETOCONAZOLE: 20 CREAM TOPICAL at 00:00

## 2021-09-15 RX ADMIN — HYDROCORTISONE: 25 OINTMENT TOPICAL at 00:00

## 2021-09-15 ASSESSMENT — LOCATION SIMPLE DESCRIPTION DERM
LOCATION SIMPLE: CHEST
LOCATION SIMPLE: LEFT BREAST
LOCATION SIMPLE: RIGHT BREAST

## 2021-09-15 ASSESSMENT — LOCATION DETAILED DESCRIPTION DERM
LOCATION DETAILED: RIGHT MEDIAL SUPERIOR CHEST
LOCATION DETAILED: LOWER STERNUM
LOCATION DETAILED: RIGHT MEDIAL BREAST 5-6:00 REGION
LOCATION DETAILED: RIGHT MEDIAL BREAST 2-3:00 REGION
LOCATION DETAILED: LEFT MEDIAL BREAST 7-8:00 REGION

## 2021-09-15 ASSESSMENT — LOCATION ZONE DERM: LOCATION ZONE: TRUNK

## 2021-09-15 NOTE — HPI: EVALUATION OF SKIN LESION(S)
What Type Of Note Output Would You Prefer (Optional)?: Bullet Format
Hpi Title: Evaluation of Skin Lesions
Location: Right forearm
Year Removed: 2012

## 2021-10-02 DIAGNOSIS — E11.9 TYPE 2 DIABETES MELLITUS WITHOUT COMPLICATION, WITHOUT LONG-TERM CURRENT USE OF INSULIN (HCC): ICD-10-CM

## 2021-10-06 RX ORDER — CANAGLIFLOZIN 100 MG/1
TABLET, FILM COATED ORAL
Qty: 30 TABLET | Refills: 2 | Status: SHIPPED | OUTPATIENT
Start: 2021-10-06 | End: 2022-07-12 | Stop reason: ALTCHOICE

## 2021-11-11 RX ORDER — ROPINIROLE 1 MG/1
TABLET, FILM COATED ORAL
Qty: 90 TABLET | Refills: 1 | Status: SHIPPED | OUTPATIENT
Start: 2021-11-11 | End: 2022-05-04

## 2021-11-12 RX ORDER — ATORVASTATIN CALCIUM 10 MG/1
TABLET, FILM COATED ORAL
Qty: 90 TABLET | Refills: 3 | Status: SHIPPED | OUTPATIENT
Start: 2021-11-12 | End: 2022-02-09

## 2021-12-09 RX ORDER — LEVOTHYROXINE SODIUM 0.1 MG/1
TABLET ORAL
Qty: 90 TABLET | Refills: 3 | Status: SHIPPED | OUTPATIENT
Start: 2021-12-09

## 2021-12-09 RX ORDER — TRIAMTERENE AND HYDROCHLOROTHIAZIDE 37.5; 25 MG/1; MG/1
TABLET ORAL
Qty: 45 TABLET | Refills: 3 | Status: SHIPPED | OUTPATIENT
Start: 2021-12-09

## 2022-02-09 RX ORDER — ATORVASTATIN CALCIUM 10 MG/1
TABLET, FILM COATED ORAL
Qty: 90 TABLET | Refills: 0 | Status: SHIPPED | OUTPATIENT
Start: 2022-02-09 | End: 2022-07-18

## 2022-02-09 RX ORDER — METFORMIN HYDROCHLORIDE 500 MG/1
TABLET, EXTENDED RELEASE ORAL
Qty: 90 TABLET | Refills: 0 | Status: SHIPPED | OUTPATIENT
Start: 2022-02-09 | End: 2022-04-15

## 2022-03-02 ENCOUNTER — OFFICE VISIT (OUTPATIENT)
Dept: INTERNAL MEDICINE CLINIC | Age: 72
End: 2022-03-02
Payer: MEDICARE

## 2022-03-02 VITALS
DIASTOLIC BLOOD PRESSURE: 70 MMHG | SYSTOLIC BLOOD PRESSURE: 136 MMHG | HEART RATE: 88 BPM | WEIGHT: 235 LBS | OXYGEN SATURATION: 98 % | HEIGHT: 69 IN | BODY MASS INDEX: 34.8 KG/M2

## 2022-03-02 DIAGNOSIS — J45.21 MILD INTERMITTENT ASTHMA WITH EXACERBATION: Primary | ICD-10-CM

## 2022-03-02 PROCEDURE — G8484 FLU IMMUNIZE NO ADMIN: HCPCS | Performed by: INTERNAL MEDICINE

## 2022-03-02 PROCEDURE — 1036F TOBACCO NON-USER: CPT | Performed by: INTERNAL MEDICINE

## 2022-03-02 PROCEDURE — 3017F COLORECTAL CA SCREEN DOC REV: CPT | Performed by: INTERNAL MEDICINE

## 2022-03-02 PROCEDURE — G8427 DOCREV CUR MEDS BY ELIG CLIN: HCPCS | Performed by: INTERNAL MEDICINE

## 2022-03-02 PROCEDURE — 1090F PRES/ABSN URINE INCON ASSESS: CPT | Performed by: INTERNAL MEDICINE

## 2022-03-02 PROCEDURE — 99214 OFFICE O/P EST MOD 30 MIN: CPT | Performed by: INTERNAL MEDICINE

## 2022-03-02 PROCEDURE — G8400 PT W/DXA NO RESULTS DOC: HCPCS | Performed by: INTERNAL MEDICINE

## 2022-03-02 PROCEDURE — 1123F ACP DISCUSS/DSCN MKR DOCD: CPT | Performed by: INTERNAL MEDICINE

## 2022-03-02 PROCEDURE — G8417 CALC BMI ABV UP PARAM F/U: HCPCS | Performed by: INTERNAL MEDICINE

## 2022-03-02 PROCEDURE — 4040F PNEUMOC VAC/ADMIN/RCVD: CPT | Performed by: INTERNAL MEDICINE

## 2022-03-02 RX ORDER — PREDNISONE 20 MG/1
40 TABLET ORAL DAILY
Qty: 10 TABLET | Refills: 0 | Status: SHIPPED | OUTPATIENT
Start: 2022-03-02 | End: 2022-03-07

## 2022-03-02 RX ORDER — ALBUTEROL SULFATE 90 UG/1
2 AEROSOL, METERED RESPIRATORY (INHALATION) EVERY 4 HOURS PRN
Qty: 1 EACH | Refills: 2 | Status: SHIPPED | OUTPATIENT
Start: 2022-03-02

## 2022-04-08 ENCOUNTER — OFFICE VISIT (OUTPATIENT)
Dept: INTERNAL MEDICINE CLINIC | Age: 72
End: 2022-04-08
Payer: MEDICARE

## 2022-04-08 VITALS
WEIGHT: 243.2 LBS | SYSTOLIC BLOOD PRESSURE: 130 MMHG | OXYGEN SATURATION: 98 % | HEIGHT: 69 IN | DIASTOLIC BLOOD PRESSURE: 82 MMHG | BODY MASS INDEX: 36.02 KG/M2 | TEMPERATURE: 97.7 F | HEART RATE: 81 BPM

## 2022-04-08 DIAGNOSIS — M25.531 BILATERAL WRIST PAIN: ICD-10-CM

## 2022-04-08 DIAGNOSIS — E11.9 TYPE 2 DIABETES MELLITUS WITHOUT COMPLICATION, WITHOUT LONG-TERM CURRENT USE OF INSULIN (HCC): ICD-10-CM

## 2022-04-08 DIAGNOSIS — E04.1 THYROID NODULE: ICD-10-CM

## 2022-04-08 DIAGNOSIS — M79.672 BILATERAL FOOT PAIN: ICD-10-CM

## 2022-04-08 DIAGNOSIS — E66.01 SEVERE OBESITY (BMI 35.0-39.9) WITH COMORBIDITY (HCC): ICD-10-CM

## 2022-04-08 DIAGNOSIS — Z23 NEED FOR PNEUMOCOCCAL VACCINATION: ICD-10-CM

## 2022-04-08 DIAGNOSIS — E03.9 HYPOTHYROIDISM, UNSPECIFIED TYPE: ICD-10-CM

## 2022-04-08 DIAGNOSIS — M25.532 BILATERAL WRIST PAIN: ICD-10-CM

## 2022-04-08 DIAGNOSIS — M79.671 BILATERAL FOOT PAIN: ICD-10-CM

## 2022-04-08 DIAGNOSIS — E78.2 HYPERLIPIDEMIA, MIXED: ICD-10-CM

## 2022-04-08 DIAGNOSIS — E79.0 HYPERURICEMIA: ICD-10-CM

## 2022-04-08 DIAGNOSIS — M67.431 GANGLION CYST OF VOLAR ASPECT OF RIGHT WRIST: ICD-10-CM

## 2022-04-08 DIAGNOSIS — F32.4 MAJOR DEPRESSIVE DISORDER, SINGLE EPISODE, IN PARTIAL REMISSION (HCC): ICD-10-CM

## 2022-04-08 DIAGNOSIS — Z11.59 ENCOUNTER FOR HEPATITIS C SCREENING TEST FOR LOW RISK PATIENT: ICD-10-CM

## 2022-04-08 DIAGNOSIS — Z00.00 MEDICARE ANNUAL WELLNESS VISIT, SUBSEQUENT: Primary | ICD-10-CM

## 2022-04-08 LAB
A/G RATIO: 1.6 (ref 1.1–2.2)
ALBUMIN SERPL-MCNC: 4.1 G/DL (ref 3.4–5)
ALP BLD-CCNC: 79 U/L (ref 40–129)
ALT SERPL-CCNC: 11 U/L (ref 10–40)
ANION GAP SERPL CALCULATED.3IONS-SCNC: 15 MMOL/L (ref 3–16)
AST SERPL-CCNC: 17 U/L (ref 15–37)
BASOPHILS ABSOLUTE: 0.1 K/UL (ref 0–0.2)
BASOPHILS RELATIVE PERCENT: 0.7 %
BILIRUB SERPL-MCNC: 0.4 MG/DL (ref 0–1)
BUN BLDV-MCNC: 24 MG/DL (ref 7–20)
CALCIUM SERPL-MCNC: 10 MG/DL (ref 8.3–10.6)
CHLORIDE BLD-SCNC: 100 MMOL/L (ref 99–110)
CHOLESTEROL, TOTAL: 152 MG/DL (ref 0–199)
CO2: 25 MMOL/L (ref 21–32)
CREAT SERPL-MCNC: 0.9 MG/DL (ref 0.6–1.2)
CREATININE URINE: 163.7 MG/DL (ref 28–259)
EOSINOPHILS ABSOLUTE: 0.3 K/UL (ref 0–0.6)
EOSINOPHILS RELATIVE PERCENT: 3.1 %
GFR AFRICAN AMERICAN: >60
GFR NON-AFRICAN AMERICAN: >60
GLUCOSE BLD-MCNC: 133 MG/DL (ref 70–99)
HCT VFR BLD CALC: 35.7 % (ref 36–48)
HDLC SERPL-MCNC: 47 MG/DL (ref 40–60)
HEMOGLOBIN: 11 G/DL (ref 12–16)
HEPATITIS C ANTIBODY INTERPRETATION: NORMAL
LDL CHOLESTEROL CALCULATED: 57 MG/DL
LYMPHOCYTES ABSOLUTE: 1.6 K/UL (ref 1–5.1)
LYMPHOCYTES RELATIVE PERCENT: 19.8 %
MCH RBC QN AUTO: 22.4 PG (ref 26–34)
MCHC RBC AUTO-ENTMCNC: 30.8 G/DL (ref 31–36)
MCV RBC AUTO: 72.9 FL (ref 80–100)
MICROALBUMIN UR-MCNC: 1.4 MG/DL
MICROALBUMIN/CREAT UR-RTO: 8.6 MG/G (ref 0–30)
MONOCYTES ABSOLUTE: 0.7 K/UL (ref 0–1.3)
MONOCYTES RELATIVE PERCENT: 8.7 %
NEUTROPHILS ABSOLUTE: 5.6 K/UL (ref 1.7–7.7)
NEUTROPHILS RELATIVE PERCENT: 67.7 %
PDW BLD-RTO: 20 % (ref 12.4–15.4)
PLATELET # BLD: 417 K/UL (ref 135–450)
PMV BLD AUTO: 8.5 FL (ref 5–10.5)
POTASSIUM SERPL-SCNC: 5.1 MMOL/L (ref 3.5–5.1)
RBC # BLD: 4.89 M/UL (ref 4–5.2)
SODIUM BLD-SCNC: 140 MMOL/L (ref 136–145)
TOTAL PROTEIN: 6.6 G/DL (ref 6.4–8.2)
TRIGL SERPL-MCNC: 242 MG/DL (ref 0–150)
TSH REFLEX: 3.22 UIU/ML (ref 0.27–4.2)
VLDLC SERPL CALC-MCNC: 48 MG/DL
WBC # BLD: 8.3 K/UL (ref 4–11)

## 2022-04-08 PROCEDURE — 90732 PPSV23 VACC 2 YRS+ SUBQ/IM: CPT | Performed by: INTERNAL MEDICINE

## 2022-04-08 PROCEDURE — G0009 ADMIN PNEUMOCOCCAL VACCINE: HCPCS | Performed by: INTERNAL MEDICINE

## 2022-04-08 PROCEDURE — 1123F ACP DISCUSS/DSCN MKR DOCD: CPT | Performed by: INTERNAL MEDICINE

## 2022-04-08 PROCEDURE — 3017F COLORECTAL CA SCREEN DOC REV: CPT | Performed by: INTERNAL MEDICINE

## 2022-04-08 PROCEDURE — 3046F HEMOGLOBIN A1C LEVEL >9.0%: CPT | Performed by: INTERNAL MEDICINE

## 2022-04-08 PROCEDURE — G0439 PPPS, SUBSEQ VISIT: HCPCS | Performed by: INTERNAL MEDICINE

## 2022-04-08 PROCEDURE — 4040F PNEUMOC VAC/ADMIN/RCVD: CPT | Performed by: INTERNAL MEDICINE

## 2022-04-08 ASSESSMENT — PATIENT HEALTH QUESTIONNAIRE - PHQ9
1. LITTLE INTEREST OR PLEASURE IN DOING THINGS: 0
SUM OF ALL RESPONSES TO PHQ QUESTIONS 1-9: 0
SUM OF ALL RESPONSES TO PHQ QUESTIONS 1-9: 0
2. FEELING DOWN, DEPRESSED OR HOPELESS: 0
SUM OF ALL RESPONSES TO PHQ9 QUESTIONS 1 & 2: 0
SUM OF ALL RESPONSES TO PHQ QUESTIONS 1-9: 0
SUM OF ALL RESPONSES TO PHQ QUESTIONS 1-9: 0

## 2022-04-08 NOTE — PATIENT INSTRUCTIONS
Personalized Preventive Plan for Kristan Amin - 4/8/2022  Medicare offers a range of preventive health benefits. Some of the tests and screenings are paid in full while other may be subject to a deductible, co-insurance, and/or copay. Some of these benefits include a comprehensive review of your medical history including lifestyle, illnesses that may run in your family, and various assessments and screenings as appropriate. After reviewing your medical record and screening and assessments performed today your provider may have ordered immunizations, labs, imaging, and/or referrals for you. A list of these orders (if applicable) as well as your Preventive Care list are included within your After Visit Summary for your review. Other Preventive Recommendations:    · A preventive eye exam performed by an eye specialist is recommended every 1-2 years to screen for glaucoma; cataracts, macular degeneration, and other eye disorders. · A preventive dental visit is recommended every 6 months. · Try to get at least 150 minutes of exercise per week or 10,000 steps per day on a pedometer . · Order or download the FREE \"Exercise & Physical Activity: Your Everyday Guide\" from [employer]. Call 1-933.890.3241 or search [employer] online. · You need 6565-6207 mg of calcium and 6327-4715 IU of vitamin D per day. It is possible to meet your calcium requirement with diet alone, but a vitamin D supplement is usually necessary to meet this goal.  · When exposed to the sun, use a sunscreen that protects against both UVA and UVB radiation with an SPF of 30 or greater. Reapply every 2 to 3 hours or after sweating, drying off with a towel, or swimming. · Always wear a seat belt when traveling in a car. Always wear a helmet when riding a bicycle or motorcycle.     Lonnie Perkins, [address]  Phone: [phone]  Fax: [phone]

## 2022-04-08 NOTE — PROGRESS NOTES
Medicare Annual Wellness Visit    Nicole Sarah is here for Medicare AWV and Cough (still having the cough and sob from the asthma attach 3/2/2022)    Assessment & Plan   Medicare annual wellness visit, subsequent  Type 2 diabetes mellitus without complication, without long-term current use of insulin ([facility]  -Has been out of Invokana, will recheck A1c, if A1c is over 7 then resume Invokana, otherwise will continue Metformin  mg daily  -     Comprehensive Metabolic Panel; Future  -     Lipid Panel; Future  -     CBC with Auto Differential; Future  -     Hemoglobin A1C; Future  -     Microalbumin / Creatinine Urine Ratio; Future  -     External Referral To Podiatry  Major depressive disorder, single episode, in partial remission ([facility]   -Mood stable, monitor  Severe obesity (BMI 35.0-39. 9) with comorbidity ([facility]   -Work on weight loss, limited in exercise  Hypothyroidism, unspecified type  -Controlled, continue levothyroxine 100 mcg daily  -     Comprehensive Metabolic Panel; Future  -     Lipid Panel; Future  -     CBC with Auto Differential; Future  -     TSH with Reflex; Future  Bilateral foot pain  -She may have some tendinitis, refer to podiatry  -     External Referral To Podiatry  Bilateral wrist pain   -Suspect osteoarthritis largely, low suspicion for inflammatory arthritis. Ganglion cyst of volar aspect of right wrist   -Consistent with ganglion cyst, if bothersome can see hand surgery  Thyroid nodule  -Last ultrasound was in 2018, would repeat ultrasound and if no interval growth may not need to follow further  -     US THYROID; Future  Hyperuricemia   -Controlled, continue allopurinol 100 mg daily  Hyperlipidemia, mixed   -Controlled, continue atorvastatin 10 mg daily  Encounter for hepatitis C screening test for low risk patient  -     Hepatitis C Antibody;  Future  Need for pneumococcal vaccination  -     Pneumococcal polysaccharide vaccine 23-valent greater than or equal to 3yo subcutaneous/IM      Recommendations for Preventive Services Due: see orders and patient instructions/AVS.  Recommended screening schedule for the next 5-10 years is provided to the patient in written form: see Patient Instructions/AVS.     Return in 6 months (on 10/8/2022) for DM follow up. Subjective   The following acute and/or chronic problems were also addressed today:    She has been out of the Invokana for diabetes, was unable to get a refill in January. Still taking the Metformin. Otherwise no significant changes. She is still taking the ropinirole for restless legs, she sees pain management for chronic back pain for which she is managed with hydrocodone. She has had many injections and procedures over the years without significant relief. She has had pain in both wrists and both ankles/feet. There is a new lump on the volar aspect of the right wrist.  She also notes pain and cramping in the hands if she uses it too much, such as peeling potatoes. She has to prop up her wrist when using the mouse or it is painful. She has more pain going into the thumbs. Not sure if she should see rheumatology. Patient's complete Health Risk Assessment and screening values have been reviewed and are found in Flowsheets. The following problems were reviewed today and where indicated follow up appointments were made and/or referrals ordered. Positive Risk Factor Screenings with Interventions:               Opioid Risk: (Low risk score <55) Opioid risk score: 12    Patient is low risk for opioid use disorder or overdose.   Last PDMP Carline Mi as Reviewed:  Review User Review Instant Review Result   Becky Person 4/6/2022  9:43 AM Reviewed PDMP [1]     Last Controlled Substance Monitoring Documentation      Office Visit from 4/6/2022 in [address] [facility]   Periodic Controlled Substance Monitoring Possible medication side effects, risk of tolerance/dependence & alternative treatments discussed., No signs of potential drug abuse or diversion identified. , Assessed functional status., Obtaining appropriate analgesic effect of treatment.  filed at 04/06/2022 Teena Ball and ACP:  General  In general, how would you say your health is?: Very Good  In the past 7 days, have you experienced any of the following: New or Increased Pain, New or Increased Fatigue, Loneliness, Social Isolation, Stress or Anger?: No  Do you get the social and emotional support that you need?: Yes  Do you have a Living Will?: (!) No    Advance Directives     Power of [address] [place] [address] Will ACP-Advance Directive ACP-Power of     Not on File Not on File Not on File Not on File      General Health Risk Interventions:  · No Living Will: Advance Care Planning addressed with patient today    Health Habits/Nutrition:     Physical Activity: Inactive    Days of Exercise per Week: 0 days    Minutes of Exercise per Session: 0 min     Have you lost any weight without trying in the past 3 months?: No  Body mass index: (!) 35.91  Have you seen the dentist within the past year?: Yes    Health Habits/Nutrition Interventions:  · Inadequate physical activity:  patient is not ready to increase his/her physical activity level at this time     Safety:  Do you have working smoke detectors?: Yes  Do you have any tripping hazards - loose or unsecured carpets or rugs?: No  Do you have any tripping hazards - clutter in doorways, halls, or stairs?: No  Do you have either shower bars, grab bars, non-slip mats or non-slip surfaces in your shower or bathtub?: (!) No (she will be getting them installed)  Do all of your stairways have a railing or banister?: Yes  Do you always fasten your seatbelt when you are in a car?: Yes    Safety Interventions:  · Home safety tips provided           Objective   Vitals:    04/08/22 0905   BP: 130/82   Site: Left Upper Arm   Position: Sitting   Pulse: 81   Temp: 97.7 °F (36.5 °C)   SpO2: 98%   Weight: 243 lb 3.2 oz (110.3 kg)   Height: 5' 9\" (1.753 m)      Body mass index is 35.91 kg/m². Physical Exam  Vitals reviewed. Constitutional:       General: She is not in acute distress. Appearance: Normal appearance. She is well-developed. HENT:      Head: Normocephalic and atraumatic. Right Ear: Tympanic membrane, ear canal and external ear normal.      Left Ear: Tympanic membrane, ear canal and external ear normal.   Eyes:      General: No scleral icterus. Conjunctiva/sclera: Conjunctivae normal.   Cardiovascular:      Rate and Rhythm: Normal rate and regular rhythm. Heart sounds: Normal heart sounds. Comments: 1-2+ bilateral lower extremity edema  Pulmonary:      Effort: Pulmonary effort is normal. No respiratory distress. Breath sounds: Normal breath sounds. Abdominal:      General: Abdomen is flat. Bowel sounds are normal. There is no distension. Palpations: Abdomen is soft. There is no mass. Tenderness: There is no abdominal tenderness. Hernia: No hernia is present. Musculoskeletal:      Comments: Ganglion cyst - volar aspect of right wrist  Heberden's nodes  No synovitis in the hands   Skin:     General: Skin is warm and dry. Neurological:      Mental Status: She is alert and oriented to person, place, and time. Psychiatric:         Mood and Affect: Mood normal.         Behavior: Behavior normal.         Thought Content: Thought content normal.         Judgment: Judgment normal.               Allergies   Allergen Reactions    Warfarin And Related Hives     Prior to Visit Medications    Medication Sig Taking? Authorizing Provider   HYDROcodone-acetaminophen (NORCO) 5-325 MG per tablet Take 1 tablet by mouth every 8 hours as needed for Pain for up to 30 days.  Yes Jeanmarie Koo MD   albuterol sulfate  (90 Base) MCG/ACT inhaler Inhale 2 puffs into the lungs every 4 hours as needed for Wheezing or Shortness of Breath Yes Elvira Pinedo MD   metFORMIN (GLUCOPHAGE-XR) 500 MG extended release tablet TAKE 1 TABLET EVERY DAY Yes Elvira Pinedo MD   atorvastatin (LIPITOR) 10 MG tablet TAKE 1 TABLET BY MOUTH EVERY DAY Yes Elvira Pindeo MD   triamterene-hydroCHLOROthiazide (MAXZIDE-25) 37.5-25 MG per tablet TAKE 1/2 OF A TABLET BY MOUTH EVERY DAY Yes Elvira Pinedo MD   levothyroxine (SYNTHROID) 100 MCG tablet TAKE 1 TABLET BY MOUTH EVERY DAY Yes Elvira Pinedo MD   rOPINIRole (REQUIP) 1 MG tablet TAKE 1 TABLET BY MOUTH EVERY DAY Yes Elvira Pinedo MD   nystatin (MYCOSTATIN) 707458 UNIT/GM powder Apply 3 times daily. Yes Violetta Vallecillo MD   ketoconazole (NIZORAL) 2 % cream Apply topically daily.  Yes Violetta Vallecillo MD   allopurinol (ZYLOPRIM) 100 MG tablet TAKE 1 TABLET BY MOUTH EVERY DAY Yes Joan Paz MD   INVOKANA 100 MG TABS tablet TAKE 1 TABLET BY MOUTH EVERY DAY IN THE MORNING BEFORE BREAKFAST  Patient not taking: Reported on 4/8/2022  Joan Paz MD       [facility] (Including outside providers/suppliers regularly involved in providing care):   Patient Care Team:  Elvira Pinedo MD as PCP - General (Internal Medicine)  Elvira Pinedo MD as PCP - [facility]    Reviewed and updated this visit:  Tobacco  Allergies  Meds  Med Hx  Surg Hx  Soc Hx  Fam Hx

## 2022-04-09 LAB
ESTIMATED AVERAGE GLUCOSE: 151.3 MG/DL
HBA1C MFR BLD: 6.9 %

## 2022-04-13 DIAGNOSIS — D64.9 ANEMIA, UNSPECIFIED TYPE: Primary | ICD-10-CM

## 2022-04-14 DIAGNOSIS — D64.9 ANEMIA, UNSPECIFIED TYPE: ICD-10-CM

## 2022-04-14 LAB
FOLATE: >20 NG/ML (ref 4.78–24.2)
IRON SATURATION: 6 % (ref 15–50)
IRON: 23 UG/DL (ref 37–145)
TOTAL IRON BINDING CAPACITY: 414 UG/DL (ref 260–445)
VITAMIN B-12: 429 PG/ML (ref 211–911)

## 2022-04-15 RX ORDER — METFORMIN HYDROCHLORIDE 500 MG/1
TABLET, EXTENDED RELEASE ORAL
Qty: 90 TABLET | Refills: 3 | Status: SHIPPED | OUTPATIENT
Start: 2022-04-15

## 2022-04-18 ENCOUNTER — HOSPITAL ENCOUNTER (OUTPATIENT)
Dept: ULTRASOUND IMAGING | Age: 72
Discharge: HOME | End: 2022-04-18
Payer: MEDICARE

## 2022-04-18 PROCEDURE — 76536 US EXAM OF HEAD AND NECK: CPT

## 2022-05-04 RX ORDER — ROPINIROLE 1 MG/1
TABLET, FILM COATED ORAL
Qty: 90 TABLET | Refills: 1 | Status: SHIPPED | OUTPATIENT
Start: 2022-05-04 | End: 2022-10-26

## 2022-07-12 RX ORDER — ACETAMINOPHEN 160 MG
TABLET,DISINTEGRATING ORAL DAILY
COMMUNITY

## 2022-07-12 RX ORDER — M-VIT,TX,IRON,MINS/CALC/FOLIC 27MG-0.4MG
1 TABLET ORAL DAILY
COMMUNITY

## 2022-07-12 NOTE — PROGRESS NOTES
[facility] time_____0730_______        Surgery time_____0900_______    Take the following medications with a sip of water: Follow your MD/Surgeons pre-procedure instructions regarding your medications     Do not eat or drink anything after 12:00 midnight prior to your surgery. This includes water chewing gum, mints and ice chips. You may brush your teeth and gargle the morning of your surgery, but do not swallow the water     Please see your family doctor/pediatrician for a history and physical and/or concerning medications. Bring any test results/reports from your physicians office. If you are under the care of a heart doctor or specialist doctor, please be aware that you may be asked to them for clearance    You may be asked to stop blood thinners such as Coumadin, Plavix, Fragmin, Lovenox, etc., or any anti-inflammatories such as:  Aspirin, Ibuprofen, Advil, Naproxen prior to your surgery. We also ask that you stop any OTC medications such as fish oil, vitamin E, glucosamine, garlic, Multivitamins, COQ 10, etc.    We ask that you do not smoke 24 hours prior to surgery  We ask that you do not  drink any alcoholic beverages 24 hours prior to surgery     You must make arrangements for a responsible adult to take you home after your surgery. For your safety you will not be allowed to leave alone or drive yourself home. Your surgery will be cancelled if you do not have a ride home. Also for your safety, it is strongly suggested that someone stay with you the first 24 hours after your surgery. A parent or legal guardian must accompany a child scheduled for surgery and plan to stay at the hospital until the child is discharged. Please do not bring other children with you. For your comfort, please wear simple loose fitting clothing to the hospital.  Please do not bring valuables.     Do not wear any make-up or nail polish on your fingers or toes      For your safety, please do not wear any jewelry or body piercing's on the day of surgery. All jewelry must be removed. If you have dentures, they will be removed before going to operating room. For your convenience, we will provide you with a container. If you wear contact lenses or glasses, they will be removed, please bring a case for them. If you have a living will and a durable power of  for healthcare, please bring in a copy. As part of our patient safety program to minimize surgical site infections, we ask you to do the following:    · Please notify your surgeon if you develop any illness between         now and the  day of your surgery. · This includes a cough, cold, fever, sore throat, nausea,         or vomiting, and diarrhea, etc.  ·  Please notify your surgeon if you experience dizziness, shortness         of breath or blurred vision between now and the time of your surgery. Do not shave your operative site 96 hours prior to surgery. For face and neck surgery, men may use an electric razor 48 hours   prior to surgery. You may shower the night before surgery or the morning of   your surgery with an antibacterial soap. You will need to bring a photo ID and insurance card    [facility] has an onsite pharmacy, would you like to utilize our pharmacy     If you will be staying overnight and use a C-pap machine, please bring   your C-pap to hospital     Our goal is to provide you with excellent care, therefore, visitors will be limited to two(2) in the room at a time so that we may focus on providing this care for you. Please contact pre-admission testing if you have any further questions. [facility] phone number:  [phone] PAT fax number:  [phone]  Please note these are generalized instructions for all surgical cases, you may be provided with more specific instructions according to your surgery.     C-Difficile admission screening and protocol:       * Admitted with diarrhea? [] YES    [x]  NO     *Prior history of C-Diff. In last 3 months? [] YES    [x]  NO     *Antibiotic use in the past 6-8 weeks? [x]  NO    []  YES                 If yes, which ANTIBIOTIC AND REASON______     *Prior hospitalization or nursing home in the last month? []  YES    [x]  NO        SAFETY FIRST. .call before you fall

## 2022-07-16 ENCOUNTER — ANESTHESIA EVENT (OUTPATIENT)
Dept: ENDOSCOPY | Age: 72
End: 2022-07-16
Payer: MEDICARE

## 2022-07-18 RX ORDER — ATORVASTATIN CALCIUM 10 MG/1
TABLET, FILM COATED ORAL
Qty: 90 TABLET | Refills: 1 | Status: SHIPPED | OUTPATIENT
Start: 2022-07-18

## 2022-07-19 ENCOUNTER — HOSPITAL ENCOUNTER (OUTPATIENT)
Age: 72
Setting detail: OUTPATIENT SURGERY
Discharge: HOME | End: 2022-07-19
Attending: INTERNAL MEDICINE | Admitting: INTERNAL MEDICINE
Payer: MEDICARE

## 2022-07-19 ENCOUNTER — ANESTHESIA (OUTPATIENT)
Dept: ENDOSCOPY | Age: 72
End: 2022-07-19
Payer: MEDICARE

## 2022-07-19 VITALS
TEMPERATURE: 97.8 F | WEIGHT: 250.44 LBS | SYSTOLIC BLOOD PRESSURE: 152 MMHG | HEIGHT: 69 IN | HEART RATE: 68 BPM | DIASTOLIC BLOOD PRESSURE: 88 MMHG | BODY MASS INDEX: 37.09 KG/M2 | OXYGEN SATURATION: 97 % | RESPIRATION RATE: 18 BRPM

## 2022-07-19 LAB
GLUCOSE BLD-MCNC: 123 MG/DL (ref 70–99)
GLUCOSE BLD-MCNC: 124 MG/DL (ref 70–99)
PERFORMED ON: ABNORMAL
PERFORMED ON: ABNORMAL

## 2022-07-19 PROCEDURE — 3609017100 HC EGD: Performed by: INTERNAL MEDICINE

## 2022-07-19 PROCEDURE — 2580000003 HC RX 258: Performed by: ANESTHESIOLOGY

## 2022-07-19 PROCEDURE — 6360000002 HC RX W HCPCS: Performed by: NURSE ANESTHETIST, CERTIFIED REGISTERED

## 2022-07-19 PROCEDURE — 2709999900 HC NON-CHARGEABLE SUPPLY: Performed by: INTERNAL MEDICINE

## 2022-07-19 PROCEDURE — 7100000010 HC PHASE II RECOVERY - FIRST 15 MIN: Performed by: INTERNAL MEDICINE

## 2022-07-19 PROCEDURE — 7100000000 HC PACU RECOVERY - FIRST 15 MIN: Performed by: INTERNAL MEDICINE

## 2022-07-19 PROCEDURE — 7100000011 HC PHASE II RECOVERY - ADDTL 15 MIN: Performed by: INTERNAL MEDICINE

## 2022-07-19 PROCEDURE — 7100000001 HC PACU RECOVERY - ADDTL 15 MIN: Performed by: INTERNAL MEDICINE

## 2022-07-19 PROCEDURE — 6370000000 HC RX 637 (ALT 250 FOR IP): Performed by: INTERNAL MEDICINE

## 2022-07-19 PROCEDURE — 2500000003 HC RX 250 WO HCPCS: Performed by: NURSE ANESTHETIST, CERTIFIED REGISTERED

## 2022-07-19 PROCEDURE — 6370000000 HC RX 637 (ALT 250 FOR IP): Performed by: STUDENT IN AN ORGANIZED HEALTH CARE EDUCATION/TRAINING PROGRAM

## 2022-07-19 PROCEDURE — 2580000003 HC RX 258: Performed by: NURSE ANESTHETIST, CERTIFIED REGISTERED

## 2022-07-19 PROCEDURE — 3700000001 HC ADD 15 MINUTES (ANESTHESIA): Performed by: INTERNAL MEDICINE

## 2022-07-19 PROCEDURE — 3609027000 HC COLONOSCOPY: Performed by: INTERNAL MEDICINE

## 2022-07-19 PROCEDURE — 3700000000 HC ANESTHESIA ATTENDED CARE: Performed by: INTERNAL MEDICINE

## 2022-07-19 RX ORDER — ONDANSETRON 2 MG/ML
4 INJECTION INTRAMUSCULAR; INTRAVENOUS
Status: DISCONTINUED | OUTPATIENT
Start: 2022-07-19 | End: 2022-07-19 | Stop reason: HOSPADM

## 2022-07-19 RX ORDER — SODIUM CHLORIDE 0.9 % (FLUSH) 0.9 %
5-40 SYRINGE (ML) INJECTION PRN
Status: DISCONTINUED | OUTPATIENT
Start: 2022-07-19 | End: 2022-07-19 | Stop reason: HOSPADM

## 2022-07-19 RX ORDER — SIMETHICONE 20 MG/.3ML
EMULSION ORAL PRN
Status: DISCONTINUED | OUTPATIENT
Start: 2022-07-19 | End: 2022-07-19 | Stop reason: HOSPADM

## 2022-07-19 RX ORDER — GLYCOPYRROLATE 0.2 MG/ML
INJECTION INTRAMUSCULAR; INTRAVENOUS PRN
Status: DISCONTINUED | OUTPATIENT
Start: 2022-07-19 | End: 2022-07-19 | Stop reason: SDUPTHER

## 2022-07-19 RX ORDER — SODIUM CHLORIDE 9 MG/ML
INJECTION, SOLUTION INTRAVENOUS CONTINUOUS PRN
Status: DISCONTINUED | OUTPATIENT
Start: 2022-07-19 | End: 2022-07-19 | Stop reason: SDUPTHER

## 2022-07-19 RX ORDER — SODIUM CHLORIDE 9 MG/ML
INJECTION, SOLUTION INTRAVENOUS CONTINUOUS
Status: DISCONTINUED | OUTPATIENT
Start: 2022-07-19 | End: 2022-07-19 | Stop reason: HOSPADM

## 2022-07-19 RX ORDER — SODIUM CHLORIDE 9 MG/ML
INJECTION, SOLUTION INTRAVENOUS PRN
Status: DISCONTINUED | OUTPATIENT
Start: 2022-07-19 | End: 2022-07-19 | Stop reason: HOSPADM

## 2022-07-19 RX ORDER — ALBUTEROL SULFATE 90 UG/1
AEROSOL, METERED RESPIRATORY (INHALATION) PRN
Status: DISCONTINUED | OUTPATIENT
Start: 2022-07-19 | End: 2022-07-19 | Stop reason: SDUPTHER

## 2022-07-19 RX ORDER — SODIUM CHLORIDE 0.9 % (FLUSH) 0.9 %
5-40 SYRINGE (ML) INJECTION EVERY 12 HOURS SCHEDULED
Status: DISCONTINUED | OUTPATIENT
Start: 2022-07-19 | End: 2022-07-19 | Stop reason: HOSPADM

## 2022-07-19 RX ORDER — PROPOFOL 10 MG/ML
INJECTION, EMULSION INTRAVENOUS PRN
Status: DISCONTINUED | OUTPATIENT
Start: 2022-07-19 | End: 2022-07-19 | Stop reason: SDUPTHER

## 2022-07-19 RX ORDER — LIDOCAINE HYDROCHLORIDE 20 MG/ML
INJECTION, SOLUTION EPIDURAL; INFILTRATION; INTRACAUDAL; PERINEURAL PRN
Status: DISCONTINUED | OUTPATIENT
Start: 2022-07-19 | End: 2022-07-19 | Stop reason: SDUPTHER

## 2022-07-19 RX ADMIN — SODIUM CHLORIDE: 9 INJECTION, SOLUTION INTRAVENOUS at 07:52

## 2022-07-19 RX ADMIN — PROPOFOL 50 MG: 10 INJECTION, EMULSION INTRAVENOUS at 08:52

## 2022-07-19 RX ADMIN — PROPOFOL 100 MG: 10 INJECTION, EMULSION INTRAVENOUS at 08:43

## 2022-07-19 RX ADMIN — PROPOFOL 50 MG: 10 INJECTION, EMULSION INTRAVENOUS at 09:10

## 2022-07-19 RX ADMIN — PROPOFOL 50 MG: 10 INJECTION, EMULSION INTRAVENOUS at 08:55

## 2022-07-19 RX ADMIN — PROPOFOL 50 MG: 10 INJECTION, EMULSION INTRAVENOUS at 08:46

## 2022-07-19 RX ADMIN — PROPOFOL 50 MG: 10 INJECTION, EMULSION INTRAVENOUS at 09:02

## 2022-07-19 RX ADMIN — PROPOFOL 50 MG: 10 INJECTION, EMULSION INTRAVENOUS at 09:15

## 2022-07-19 RX ADMIN — GLYCOPYRROLATE 0.2 MG: 0.2 INJECTION, SOLUTION INTRAMUSCULAR; INTRAVENOUS at 08:43

## 2022-07-19 RX ADMIN — PROPOFOL 50 MG: 10 INJECTION, EMULSION INTRAVENOUS at 08:47

## 2022-07-19 RX ADMIN — LIDOCAINE HYDROCHLORIDE 60 MG: 20 INJECTION, SOLUTION EPIDURAL; INFILTRATION; INTRACAUDAL; PERINEURAL at 08:43

## 2022-07-19 RX ADMIN — PROPOFOL 50 MG: 10 INJECTION, EMULSION INTRAVENOUS at 08:49

## 2022-07-19 RX ADMIN — PROPOFOL 50 MG: 10 INJECTION, EMULSION INTRAVENOUS at 09:05

## 2022-07-19 RX ADMIN — SODIUM CHLORIDE: 9 INJECTION, SOLUTION INTRAVENOUS at 08:38

## 2022-07-19 RX ADMIN — PROPOFOL 50 MG: 10 INJECTION, EMULSION INTRAVENOUS at 08:58

## 2022-07-19 RX ADMIN — Medication 2 PUFF: at 08:10

## 2022-07-19 ASSESSMENT — PAIN - FUNCTIONAL ASSESSMENT: PAIN_FUNCTIONAL_ASSESSMENT: 0-10

## 2022-07-19 ASSESSMENT — LIFESTYLE VARIABLES: SMOKING_STATUS: 0

## 2022-07-19 NOTE — OP NOTE
Endoscopy Note    Patient: Lindsay Mccoy  : 1950  Acct#:     Procedure: Esophagogastroduodenoscopy                        Colonoscopy     Date:  2022     Surgeon:   Oc Cash MD    Referring Physician:  Beatrix Gosselin, MD    Indications: This is a 70 y.o. female  who presents for EGD and colonoscopy due to iron deficiency anemia, without overt GI bleeding. Postoperative Diagnosis:   Subtle scarring in the distal esophagus. 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 These include, but are not limited to hemorrhage, bleeding, post procedural pain, perforation, phlebitis, aspiration, hypotension, hypoxia, cardiovascular events such as arryhthmia, and possibly death. Additionally, the possibility of missed colonic polyps and interval colon cancer was discussed in the consent. Description of Procedure: The patient was then taken to the endoscopy suite, placed in the left lateral decubitus position and the above IV sedation was administrered. The Olympus video endoscope was placed through the patient's oropharynx without difficulty to the extent of the jejunum  Both forward and retroflexed views of the stomach were obtained. Findings:    Esophagus: The esophagus was notable for subtle scarring in the distal esophagus. There was no evidence of active esophagitis or ulceration. The Z-line was located 37 cm from the incisors. There was no evidence of Mims's esophagus. Stomach: The stomach was notable for prior Nae-en-Y gastric bypass, with gastric pouch spanning from 37 to 44 cm from the incisors. The surgical anastomosis was healthy without evidence of erosion or ulceration. Jejunum:  The examined small bowel appeared normal    The scope was then withdrawn back into the stomach, it was decompressed, and the scope was completely withdrawn. A digital rectal examination was performed and revealed negative without mass, lesions or tenderness. The adult Olympus video colonoscope was placed in the patient's rectum under digital direction and advanced to the cecum. The colon was redundant and tortuous, and required abdominal pressure and extensive loop reduction to advance to the cecum. The cecum was identified by characteristic anatomy and ballottment. The prep was good. The ileocecal valve was identified. The terminal ileum was inspected. The scope was then withdrawn back through the cecum, ascending, transverse, descending, sigmoid colon, and rectum. Careful circumferential examination of the mucosa in these areas demonstrated normal colonic mucosa throughout. The scope was then withdrawn into the rectum and retroflexed. The retroflexed view of the anal verge and rectum demonstrates mild internal hemorrhoids. The scope was straightened, the colon was decompressed and the scope was withdrawn from the patient. The patient tolerated the procedure well and was taken to the PACU in good condition. Estimated Blood Loss: None    Impression:    1) See post procedure diagnoses    Recommendations:   - Follow-up pathology results in 7 days, by calling the office at [phone].  - Resume regular medications. - Resume diet as tolerated. - No repeat colonoscopy for colon cancer screening given patient's age. - Recommend video capsule endoscopy for further evaluation of iron deficiency anemia. Our clinic will contact patient to schedule appointment for capsule endoscopy.     PIERRE Holley [address] [facility]

## 2022-07-19 NOTE — ANESTHESIA POSTPROCEDURE EVALUATION
Department of Anesthesiology  Postprocedure Note    Patient: Joe Willett  MRN: [id number]  YOB: 1950  Date of evaluation: 7/19/2022      Procedure Summary     Date: 07/19/22 Room / Location: [address] [place] [address]    Anesthesia Start: 9325 Anesthesia Stop: 0923    Procedures:       COLONOSCOPY      ESOPHAGOGASTRODUODENOSCOPY Diagnosis:       Anemia, unspecified type      (ANEMIA)    Surgeons: Clemente Phelps MD Responsible Provider: Tatiana Worrell MD    Anesthesia Type: MAC ASA Status: 3          Anesthesia Type: MAC    Ranjith Phase I: Ranjith Score: 10    Ranjith Phase II: Ranjiht Score: 10      Anesthesia Post Evaluation    Patient location during evaluation: PACU  Patient participation: complete - patient participated  Level of consciousness: awake and alert  Airway patency: patent  Nausea & Vomiting: no nausea and no vomiting  Complications: no  Cardiovascular status: hemodynamically stable and blood pressure returned to baseline  Respiratory status: spontaneous ventilation, nonlabored ventilation and room air  Hydration status: stable  Comments: Ms. Shaunna Reyes was seen resting comfortably following procedure. Appropriate for return to [facility] for planned discharge home.

## 2022-07-19 NOTE — ANESTHESIA PRE PROCEDURE
Department of Anesthesiology  Preprocedure Note       Name:  Lindsay Mccoy   Age:  70 y.o.  :  1950                                          MRN:  [id number]         Date:  2022      Surgeon: Ankur Olivo):  Karyn Holguin MD    Procedure: Procedure(s):  COLONOSCOPY  ESOPHAGOGASTRODUODENOSCOPY    Medications prior to admission:   Prior to Admission medications    Medication Sig Start Date End Date Taking? Authorizing Provider   Cholecalciferol (VITAMIN D3) 50 MCG (2000) CAPS Take by mouth daily   Yes Historical Provider, MD   Multiple Vitamins-Minerals (THERAPEUTIC MULTIVITAMIN-MINERALS) tablet Take 1 tablet by mouth daily   Yes Historical Provider, MD   atorvastatin (LIPITOR) 10 MG tablet TAKE 1 TABLET BY MOUTH EVERY DAY 22   Beatrix Gosselin, MD   rOPINIRole (REQUIP) 1 MG tablet TAKE 1 TABLET BY MOUTH EVERY DAY 22   Beatrix Gosselin, MD   metFORMIN (GLUCOPHAGE-XR) 500 MG extended release tablet TAKE 1 TABLET BY MOUTH EVERY DAY 4/15/22   Beatrix Gosselin, MD   albuterol sulfate  (90 Base) MCG/ACT inhaler Inhale 2 puffs into the lungs every 4 hours as needed for Wheezing or Shortness of Breath 3/2/22   Beatrix Gosselin, MD   triamterene-hydroCHLOROthiazide (MAXZIDE-25) 37.5-25 MG per tablet TAKE 1/2 OF A TABLET BY MOUTH EVERY DAY 21   Beatrix Gosselin, MD   levothyroxine (SYNTHROID) 100 MCG tablet TAKE 1 TABLET BY MOUTH EVERY DAY 21   Beatrix Gosselin, MD   ketoconazole (NIZORAL) 2 % cream Apply topically daily.   Patient taking differently: daily as needed 21   Fiona Lott MD   allopurinol (ZYLOPRIM) 100 MG tablet TAKE 1 TABLET BY MOUTH EVERY DAY 21   Mikki Berman MD       Current medications:    Current Facility-Administered Medications   Medication Dose Route Frequency Provider Last Rate Last Admin    0.9 % sodium chloride infusion   IntraVENous Continuous Aliyah Chen  mL/hr at 22 0752 New Bag at 22 0752    sodium chloride flush 0.9 % injection 5-40 mL  5-40 mL IntraVENous 2 times per day Nick Crain MD        sodium chloride flush 0.9 % injection 5-40 mL  5-40 mL IntraVENous PRN Nick Crain MD        0.9 % sodium chloride infusion   IntraVENous PRN Nick Crain MD           Allergies: Allergies   Allergen Reactions    Warfarin And Related Hives       Problem List:    Patient Active Problem List   Diagnosis Code    Hypothyroidism E03.9    Hypertension I10    Restless leg syndrome G25.81    Depression F32. A    Type 2 diabetes mellitus without complication ([facility]) J68.9    ADILENE (obstructive sleep apnea) G47.33    CAD (coronary artery disease) I25.10    Anemia D64.9    DVT of leg (deep venous thrombosis) ([facility]) I82.409    Pyuria R82.81    Renal insufficiency N28.9    Orthostatic hypotension I95.1    Thyroid nodule E04.1    Hyperlipidemia, mixed E78.2    Degeneration of lumbar or lumbosacral intervertebral disc M51.37    Displacement of lumbar intervertebral disc without myelopathy M51.26    Lumbosacral spondylosis without myelopathy M47.817    Lumbar degenerative disc disease M51.36    Spinal stenosis of lumbar region M48.061    Disc displacement, lumbar M51.26    Lumbar spondylosis M47.816    Hyperuricemia E79.0    Pacemaker Z95.0    Major depressive disorder, single episode, in partial remission ([address] F32.4       Past Medical History:        Diagnosis Date    Anemia     Arthritis     Asthma     CAD (coronary artery disease)     nonobstructive    Diabetes mellitus ([address]     Hiatal hernia     Hyperlipidemia     Hypertension     Hypothyroidism     Melanoma ([address]     Obesity 2010    bypass    ADILENE (obstructive sleep apnea)     does not use Cpap machine    Restless leg syndrome     SSS (sick sinus syndrome) ([address] 2012    dual chamber pacer - [facility]       Past Surgical History:        Procedure Laterality Date    BREAST BIOPSY Left 2000    benign    CATARACT REMOVAL WITH IMPLANT Bilateral     CERVICAL SPINE SURGERY  2006 fusion    CHOLECYSTECTOMY      COLONOSCOPY  2014    EPIDURAL STEROID INJECTION Bilateral 2019    BILATERAL LUMBAR THREE, LUMBAR FOUR, LUMBAR FIVE RADIOFREQUENCY ABLATION SITE CONFIRMED BY FLUOROSCOPY performed by Bharath Montoya MD at [address] [facility] (CERVIX STATUS UNKNOWN)      PACEMAKER INSERTION      PACEMAKER PLACEMENT   and     medtronic    PAIN MANAGEMENT PROCEDURE Bilateral 2021    BILATERAL LUMBAR THREE LUMBAR FOUR LUMBAR FIVE RADIOFREQUENCY ABLATION SITE CONFIRMED BY FLUOROSCOPY performed by Bharath Montoya MD at [address]  2010    SKIN CANCER EXCISION Right 2012    melanoma right forearm     TONSILLECTOMY      TOTAL KNEE ARTHROPLASTY Left 2016    TOTAL KNEE ARTHROPLASTY Right 10/2017    Dr Sonal Fish       Social History:    Social History     Tobacco Use    Smoking status: Former     Packs/day: 1.50     Years: 10.00     Pack years: 15.00     Types: Cigarettes     Quit date:      Years since quittin.5    Smokeless tobacco: Never   Substance Use Topics    Alcohol use:  No                                Counseling given: Not Answered      Vital Signs (Current):   Vitals:    22 1432 22 0737   BP:  (!) 182/92   Pulse:  74   Resp:  16   Temp:  97.1 °F (36.2 °C)   TempSrc:  Temporal   SpO2:  97%   Weight: 240 lb (108.9 kg) 250 lb 7.1 oz (113.6 kg)   Height: 5' 9\" (1.753 m) 5' 9\" (1.753 m)                                              BP Readings from Last 3 Encounters:   22 (!) 182/92   22 130/82   22 136/70       NPO Status: Time of last liquid consumption: 200                        Time of last solid consumption:                         Date of last liquid consumption: 22                        Date of last solid food consumption: 22    BMI:   Wt Readings from Last 3 Encounters:   22 250 lb 7.1 oz (113.6 kg)   22 243 lb 3.2 oz (110.3 kg)   03/02/22 235 lb (106.6 kg)     Body mass index is 36.98 kg/m². CBC:   Lab Results   Component Value Date/Time    WBC 8.3 04/08/2022 10:08 AM    RBC 4.89 04/08/2022 10:08 AM    HGB 11.0 04/08/2022 10:08 AM    HCT 35.7 04/08/2022 10:08 AM    MCV 72.9 04/08/2022 10:08 AM    RDW 20.0 04/08/2022 10:08 AM     04/08/2022 10:08 AM       CMP:   Lab Results   Component Value Date/Time     04/08/2022 10:08 AM    K 5.1 04/08/2022 10:08 AM     04/08/2022 10:08 AM    CO2 25 04/08/2022 10:08 AM    BUN 24 04/08/2022 10:08 AM    CREATININE 0.9 04/08/2022 10:08 AM    GFRAA >60 04/08/2022 10:08 AM    AGRATIO 1.6 04/08/2022 10:08 AM    LABGLOM >60 04/08/2022 10:08 AM    GLUCOSE 133 04/08/2022 10:08 AM    PROT 6.6 04/08/2022 10:08 AM    CALCIUM 10.0 04/08/2022 10:08 AM    BILITOT 0.4 04/08/2022 10:08 AM    ALKPHOS 79 04/08/2022 10:08 AM    AST 17 04/08/2022 10:08 AM    ALT 11 04/08/2022 10:08 AM       POC Tests:   Recent Labs     07/19/22  0751   POCGLU 124*       Coags: No results found for: PROTIME, INR, APTT    HCG (If Applicable): No results found for: PREGTESTUR, PREGSERUM, HCG, HCGQUANT     ABGs: No results found for: PHART, PO2ART, JSW4BSM, QPC0AKB, BEART, E2BPMYNO     Type & Screen (If Applicable):  No results found for: LABABO, LABRH    Drug/Infectious Status (If Applicable):  No results found for: HIV, HEPCAB    COVID-19 Screening (If Applicable): No results found for: COVID19        Anesthesia Evaluation   no history of anesthetic complications:   Airway: Mallampati: III       Comment: Increased neck circumference  Mouth opening: > = 3 FB   Dental:    (+) partials      Pulmonary:   (+) COPD:  sleep apnea:  decreased breath sounds wheezes (albuterol inhaler self-administered) asthma: allergic asthma,     (-) rales and not a current smoker (former)                          PE comment: Respirations unlabored. Occasional dry cough noted.  Cardiovascular:  Exercise tolerance: good (>4 METS),   (+) hypertension:, pacemaker (Sick sinus syndrome s/p dual chamber pacemaker): pacemaker, CAD:, hyperlipidemia    (-) orthopnea,  WATSON and weak pulses      Rhythm: regular  Rate: normal                    Neuro/Psych:   (+) depression/anxiety    (-) seizures, TIA and CVA            ROS comment: Chronic back pain with spinal stenosis  Restless leg syndrome  GI/Hepatic/Renal:   (+) hiatal hernia, GERD:, renal disease:, bowel prep, morbid obesity (s/p gastric bypass, BMI 35)          Endo/Other:    (+) Diabetes (HgbA1c: 6.9%)Type II DM, , hypothyroidism, blood dyscrasia (H&H 11.0/35.7, microcytic): anemia:., .                 Abdominal:             Vascular:   + DVT, . Other Findings: Pacemaker generator over left upper chest. Steri strips remain in placed from generator change. Anesthesia Plan      MAC     ASA 3     (NPO appropriate. Home inhalers inappropriately held this morning. Albuterol inhaler self-administered in preop.)        Anesthetic plan and risks discussed with patient. Plan discussed with CRNA. This pre-anesthesia assessment may be used as a history and physical.    DOS STAFF ADDENDUM:    Pt seen and examined, chart reviewed (including anesthesia, drug and allergy history). No interval changes to history and physical examination. Anesthetic plan, risks, benefits, alternatives, and personnel involved discussed with patient. Patient verbalized an understanding and agrees to proceed.       Flaco Flores MD  July 19, 2022  8:05 AM

## 2022-07-19 NOTE — H&P
Pre-operative History and Physical    Patient: Anuj Live  : 1950  Acct#:     Intended Procedure:  EGD and colonoscopy    HISTORY OF PRESENT ILLNESS:  The patient is a 70 y.o. female  who presents for EGD and colonoscopy due to iron deficiency anemia, without overt GI bleeding. Past Medical History:        Diagnosis Date    Anemia     Arthritis     Asthma     CAD (coronary artery disease)     nonobstructive    Diabetes mellitus ([address]     Hiatal hernia     Hyperlipidemia     Hypertension     Hypothyroidism     Melanoma ([facility]     Obesity 2010    bypass    ADILENE (obstructive sleep apnea)     does not use Cpap machine    Restless leg syndrome     SSS (sick sinus syndrome) ([facility] 2012    dual chamber pacer - [facility]     Past Surgical History:        Procedure Laterality Date    BREAST BIOPSY Left 2000    benign    CATARACT REMOVAL WITH IMPLANT Bilateral     CERVICAL SPINE SURGERY  2006    fusion    CHOLECYSTECTOMY      COLONOSCOPY  2014    EPIDURAL STEROID INJECTION Bilateral 2019    BILATERAL LUMBAR THREE, LUMBAR FOUR, LUMBAR FIVE RADIOFREQUENCY ABLATION SITE CONFIRMED BY FLUOROSCOPY performed by Jocelin Keene MD at [address] [place] [address] (CERVIX STATUS UNKNOWN)      PACEMAKER INSERTION      PACEMAKER PLACEMENT   and     medtronic    PAIN MANAGEMENT PROCEDURE Bilateral 2021    BILATERAL LUMBAR THREE LUMBAR FOUR LUMBAR FIVE RADIOFREQUENCY ABLATION SITE CONFIRMED BY FLUOROSCOPY performed by Jocelin Keene MD at [address] [facility]  2010    SKIN CANCER EXCISION Right 2012    melanoma right forearm     TONSILLECTOMY      TOTAL KNEE ARTHROPLASTY Left 2016    TOTAL KNEE ARTHROPLASTY Right 10/2017    Dr Awilda Hairston     Medications Prior to Admission:   No current facility-administered medications on file prior to encounter.      Current Outpatient Medications on File Prior to Encounter   Medication Sig Dispense Refill Cholecalciferol (VITAMIN D3) 50 MCG (2000 UT) CAPS Take by mouth daily      Multiple Vitamins-Minerals (THERAPEUTIC MULTIVITAMIN-MINERALS) tablet Take 1 tablet by mouth daily      rOPINIRole (REQUIP) 1 MG tablet TAKE 1 TABLET BY MOUTH EVERY DAY 90 tablet 1    metFORMIN (GLUCOPHAGE-XR) 500 MG extended release tablet TAKE 1 TABLET BY MOUTH EVERY DAY 90 tablet 3    albuterol sulfate  (90 Base) MCG/ACT inhaler Inhale 2 puffs into the lungs every 4 hours as needed for Wheezing or Shortness of Breath 1 each 2    triamterene-hydroCHLOROthiazide (MAXZIDE-25) 37.5-25 MG per tablet TAKE 1/2 OF A TABLET BY MOUTH EVERY DAY 45 tablet 3    levothyroxine (SYNTHROID) 100 MCG tablet TAKE 1 TABLET BY MOUTH EVERY DAY 90 tablet 3    ketoconazole (NIZORAL) 2 % cream Apply topically daily. (Patient taking differently: daily as needed) 30 g 1    allopurinol (ZYLOPRIM) 100 MG tablet TAKE 1 TABLET BY MOUTH EVERY DAY 90 tablet 3        Allergies:  Warfarin and related    Social History:   TOBACCO:   reports that she quit smoking about 35 years ago. Her smoking use included cigarettes. She has a 15.00 pack-year smoking history. She has never used smokeless tobacco.  ETOH:   reports no history of alcohol use. DRUGS:   reports no history of drug use. PHYSICAL EXAM:      Vital Signs: BP (!) 182/92   Pulse 74   Temp 97.1 °F (36.2 °C) (Temporal)   Resp 16   Ht 5' 9\" (1.753 m)   Wt 250 lb 7.1 oz (113.6 kg)   SpO2 97%   BMI 36.98 kg/m²    Airway: No stridor or wheezing noted. Good air movement  Pulmonary: without wheezes.   Clear to auscultation  Cardiac:regular rate and rhythm without loud murmurs  Abdomen:soft, nontender,  Bowel sounds present    Pre-Procedure Assessment / Plan:  1) EGD and colonoscopy    ASA Grade:  ASA 3 - Patient with moderate systemic disease with functional limitations  Mallampati Classification:  Class III    Level of Sedation Plan: MAC    Post Procedure plan: Return to same level of care    I assessed the

## 2022-07-19 NOTE — PROGRESS NOTES
Tolerating oral intake and being up in chair. Discharge instructions given to patient and daughter. Verbalize understanding.

## 2022-07-19 NOTE — DISCHARGE INSTRUCTIONS
Discharge Instructions for Colonoscopy     Recommendations:   - Follow-up pathology results in 7 days, by calling the office at [phone].  - Resume regular medications. - Resume diet as tolerated. - No repeat colonoscopy for colon cancer screening given patient's age. - Recommend video capsule endoscopy for further evaluation of iron deficiency anemia. Our clinic will contact patient to schedule appointment for capsule endoscopy. Colonoscopy is a visual exam of the lining of the large intestine, also called the bowel or colon, with a colonoscope. A colonoscope is a flexible tube with a light and a viewing device. It allows the doctor to view the inside of the colon through a tiny video camera. Colonoscopy is performed for many reasons: unexplained anemia , pain, diarrhea , bloody stools, cancer screening, among many other reasons. Complications from a colonoscopy are rare. Some possible serious complications include perforated bowel (which might require surgery) and bleeding (which could require blood transfusion ). Minor complications include bloating, gas, and cramping that can last for 1-2 days after the procedure. Because air is put into your colon during the procedure, it is normal to pass large amounts of air from your rectum. You may not have a bowel movement for 1-3 days after the procedure. What You Will Need:  Someone to drive you home after the procedure     Steps to Take:  [address] when you get home. Because the sedative will make you drowsy, don't drive, operate machinery, or make important decisions the day of the procedure. Feelings of bloating, gas, or cramping may persist for 24 hours. Diet -  Try sips of water first. If tolerated, resume bland food (scrambled eggs, toast, soup) first.  If tolerated, resume regular diet or the diet recommended by your physician. Do not drink alcohol for 24 hours.    Physical Activity -  Ask your doctor when you will be able to return to work. Do not drive, operate heavy machinery, or do activities that require coordination or balance for 24 hours. Otherwise, return to your normal routine as soon as you are comfortable to do so, which is usually the next day after the procedure. Medications - When taking medications, it's important to: Take your medication as directed, not more, not less, not at a different time. Do not stop taking them without consulting your healthcare provider. Don't share them with anyone else. Know what effects and side effects to expect, and report them to your healthcare provider. If you are taking more than one drug, even if it is an over-the-counter medication, herb, or dietary supplement, be sure to check with a physician or pharmacist about drug interactions. Plan ahead for refills so you don't run out. Lifestyle Changes - The results of your colonoscopy will determine if any lifestyle changes are necessary. Follow-up:  The doctor will usually give you a preliminary report after the medication wears off and you are more alert. The results from a biopsy can take as long as 1-2 weeks to be completed. Schedule a follow-up appointment as directed by your doctor. You should schedule a follow-up colonoscopy as recommended by your doctor. Call Your Doctor If Any of the Following Occurs:  Bleeding from your rectum; notify your doctor if you pass a teaspoonful or more of blood   Black, tarry stools   Severe abdominal pain   Hard, swollen abdomen   Signs of infection, including fever or chills   Inability to pass gas or stool   Coughing, shortness of breath, chest pain, severe nausea or vomiting     In case of an emergency, call 911 immediately.

## 2022-07-21 RX ORDER — ALLOPURINOL 100 MG/1
TABLET ORAL
Qty: 90 TABLET | Refills: 3 | Status: SHIPPED | OUTPATIENT
Start: 2022-07-21

## 2022-09-14 DIAGNOSIS — K90.49 MALABSORPTION DUE TO INTOLERANCE, NOT ELSEWHERE CLASSIFIED: ICD-10-CM

## 2022-09-14 DIAGNOSIS — D50.8 OTHER IRON DEFICIENCY ANEMIA: ICD-10-CM

## 2022-09-14 PROBLEM — D50.9 IRON DEFICIENCY ANEMIA, UNSPECIFIED: Status: ACTIVE | Noted: 2022-09-14

## 2022-09-14 RX ORDER — SODIUM CHLORIDE 0.9 % (FLUSH) 0.9 %
5-40 SYRINGE (ML) INJECTION PRN
Status: CANCELLED | OUTPATIENT
Start: 2022-09-14

## 2022-09-14 RX ORDER — ACETAMINOPHEN 325 MG/1
650 TABLET ORAL
Status: CANCELLED | OUTPATIENT
Start: 2022-09-14

## 2022-09-14 RX ORDER — ALBUTEROL SULFATE 90 UG/1
4 AEROSOL, METERED RESPIRATORY (INHALATION) PRN
Status: CANCELLED | OUTPATIENT
Start: 2022-09-14

## 2022-09-14 RX ORDER — EPINEPHRINE 1 MG/ML
0.3 INJECTION, SOLUTION, CONCENTRATE INTRAVENOUS PRN
Status: CANCELLED | OUTPATIENT
Start: 2022-09-14

## 2022-09-14 RX ORDER — DIPHENHYDRAMINE HYDROCHLORIDE 50 MG/ML
50 INJECTION INTRAMUSCULAR; INTRAVENOUS
Status: CANCELLED | OUTPATIENT
Start: 2022-09-14

## 2022-09-14 RX ORDER — ONDANSETRON 2 MG/ML
8 INJECTION INTRAMUSCULAR; INTRAVENOUS
Status: CANCELLED | OUTPATIENT
Start: 2022-09-14

## 2022-09-14 RX ORDER — SODIUM CHLORIDE 9 MG/ML
INJECTION, SOLUTION INTRAVENOUS CONTINUOUS
Status: CANCELLED | OUTPATIENT
Start: 2022-09-14

## 2022-09-19 ENCOUNTER — APPOINTMENT (RX ONLY)
Dept: URBAN - METROPOLITAN AREA CLINIC 170 | Facility: CLINIC | Age: 72
Setting detail: DERMATOLOGY
End: 2022-09-19

## 2022-09-19 DIAGNOSIS — Z85.820 PERSONAL HISTORY OF MALIGNANT MELANOMA OF SKIN: ICD-10-CM

## 2022-09-19 DIAGNOSIS — D22 MELANOCYTIC NEVI: ICD-10-CM

## 2022-09-19 DIAGNOSIS — L82.1 OTHER SEBORRHEIC KERATOSIS: ICD-10-CM

## 2022-09-19 DIAGNOSIS — L81.4 OTHER MELANIN HYPERPIGMENTATION: ICD-10-CM

## 2022-09-19 DIAGNOSIS — D18.0 HEMANGIOMA: ICD-10-CM

## 2022-09-19 PROBLEM — D22.5 MELANOCYTIC NEVI OF TRUNK: Status: ACTIVE | Noted: 2022-09-19

## 2022-09-19 PROBLEM — D18.01 HEMANGIOMA OF SKIN AND SUBCUTANEOUS TISSUE: Status: ACTIVE | Noted: 2022-09-19

## 2022-09-19 PROCEDURE — ? ADDITIONAL NOTES

## 2022-09-19 PROCEDURE — 99213 OFFICE O/P EST LOW 20 MIN: CPT

## 2022-09-19 PROCEDURE — ? FULL BODY SKIN EXAM

## 2022-09-19 PROCEDURE — ? TREATMENT REGIMEN

## 2022-09-19 PROCEDURE — ? COUNSELING

## 2022-09-19 ASSESSMENT — LOCATION SIMPLE DESCRIPTION DERM
LOCATION SIMPLE: CHEST
LOCATION SIMPLE: RIGHT BREAST

## 2022-09-19 ASSESSMENT — LOCATION DETAILED DESCRIPTION DERM
LOCATION DETAILED: RIGHT MEDIAL SUPERIOR CHEST
LOCATION DETAILED: LOWER STERNUM
LOCATION DETAILED: RIGHT MEDIAL BREAST 2-3:00 REGION

## 2022-09-19 ASSESSMENT — LOCATION ZONE DERM: LOCATION ZONE: TRUNK

## 2022-09-19 NOTE — HPI: EVALUATION OF SKIN LESION(S)
What Type Of Note Output Would You Prefer (Optional)?: Bullet Format
Hpi Title: Evaluation of Skin Lesions
Have Your Spot(S) Been Treated In The Past?: has been treated
Location: Right forearm
Year Removed: 2012

## 2022-10-10 RX ORDER — EPINEPHRINE 1 MG/ML
0.3 INJECTION, SOLUTION, CONCENTRATE INTRAVENOUS PRN
Status: CANCELLED | OUTPATIENT
Start: 2022-10-13

## 2022-10-10 RX ORDER — MEPERIDINE HYDROCHLORIDE 25 MG/ML
12.5 INJECTION INTRAMUSCULAR; INTRAVENOUS; SUBCUTANEOUS PRN
Status: CANCELLED | OUTPATIENT
Start: 2022-10-13

## 2022-10-10 RX ORDER — SODIUM CHLORIDE 9 MG/ML
INJECTION, SOLUTION INTRAVENOUS CONTINUOUS
Status: CANCELLED | OUTPATIENT
Start: 2022-10-13

## 2022-10-10 RX ORDER — ONDANSETRON 2 MG/ML
8 INJECTION INTRAMUSCULAR; INTRAVENOUS
Status: CANCELLED | OUTPATIENT
Start: 2022-10-13

## 2022-10-10 RX ORDER — DIPHENHYDRAMINE HYDROCHLORIDE 50 MG/ML
50 INJECTION INTRAMUSCULAR; INTRAVENOUS
Status: CANCELLED | OUTPATIENT
Start: 2022-10-13

## 2022-10-10 RX ORDER — ACETAMINOPHEN 325 MG/1
650 TABLET ORAL
Status: CANCELLED | OUTPATIENT
Start: 2022-10-13

## 2022-10-10 RX ORDER — ALBUTEROL SULFATE 90 UG/1
4 AEROSOL, METERED RESPIRATORY (INHALATION) PRN
Status: CANCELLED | OUTPATIENT
Start: 2022-10-13

## 2022-10-10 RX ORDER — SODIUM CHLORIDE 0.9 % (FLUSH) 0.9 %
5-40 SYRINGE (ML) INJECTION PRN
Status: CANCELLED | OUTPATIENT
Start: 2022-10-13

## 2022-10-13 ENCOUNTER — HOSPITAL ENCOUNTER (OUTPATIENT)
Dept: CT IMAGING | Age: 72
Discharge: HOME | End: 2022-10-13
Payer: MEDICARE

## 2022-10-13 DIAGNOSIS — D64.9 ANEMIA, UNSPECIFIED TYPE: ICD-10-CM

## 2022-10-13 LAB
BUN BLDV-MCNC: 17 MG/DL (ref 7–20)
CREAT SERPL-MCNC: 0.8 MG/DL (ref 0.6–1.2)
GFR AFRICAN AMERICAN: >60
GFR NON-AFRICAN AMERICAN: >60

## 2022-10-13 PROCEDURE — 36415 COLL VENOUS BLD VENIPUNCTURE: CPT

## 2022-10-13 PROCEDURE — 6360000004 HC RX CONTRAST MEDICATION: Performed by: INTERNAL MEDICINE

## 2022-10-13 PROCEDURE — 84520 ASSAY OF UREA NITROGEN: CPT

## 2022-10-13 PROCEDURE — 74177 CT ABD & PELVIS W/CONTRAST: CPT

## 2022-10-13 PROCEDURE — 82565 ASSAY OF CREATININE: CPT

## 2022-10-13 RX ADMIN — IOPAMIDOL 75 ML: 755 INJECTION, SOLUTION INTRAVENOUS at 13:07

## 2022-10-13 RX ADMIN — DIATRIZOATE MEGLUMINE AND DIATRIZOATE SODIUM 20 ML: 660; 100 LIQUID ORAL; RECTAL at 13:07

## 2022-10-21 ENCOUNTER — HOSPITAL ENCOUNTER (OUTPATIENT)
Dept: INFUSION THERAPY | Age: 72
Setting detail: INFUSION SERIES
Discharge: HOME | End: 2022-10-21
Payer: MEDICARE

## 2022-10-21 VITALS
DIASTOLIC BLOOD PRESSURE: 88 MMHG | RESPIRATION RATE: 16 BRPM | SYSTOLIC BLOOD PRESSURE: 134 MMHG | TEMPERATURE: 97.2 F | OXYGEN SATURATION: 97 % | HEART RATE: 68 BPM

## 2022-10-21 DIAGNOSIS — K90.49 MALABSORPTION DUE TO INTOLERANCE, NOT ELSEWHERE CLASSIFIED: ICD-10-CM

## 2022-10-21 DIAGNOSIS — D50.8 OTHER IRON DEFICIENCY ANEMIA: Primary | ICD-10-CM

## 2022-10-21 PROCEDURE — 2580000003 HC RX 258: Performed by: INTERNAL MEDICINE

## 2022-10-21 PROCEDURE — 96374 THER/PROPH/DIAG INJ IV PUSH: CPT

## 2022-10-21 PROCEDURE — 6360000002 HC RX W HCPCS: Performed by: INTERNAL MEDICINE

## 2022-10-21 RX ORDER — ALBUTEROL SULFATE 90 UG/1
4 AEROSOL, METERED RESPIRATORY (INHALATION) PRN
Status: CANCELLED | OUTPATIENT
Start: 2022-10-28

## 2022-10-21 RX ORDER — SODIUM CHLORIDE 9 MG/ML
INJECTION, SOLUTION INTRAVENOUS CONTINUOUS
Status: CANCELLED | OUTPATIENT
Start: 2022-10-28

## 2022-10-21 RX ORDER — MEPERIDINE HYDROCHLORIDE 25 MG/ML
12.5 INJECTION INTRAMUSCULAR; INTRAVENOUS; SUBCUTANEOUS PRN
Status: CANCELLED | OUTPATIENT
Start: 2022-10-28

## 2022-10-21 RX ORDER — SODIUM CHLORIDE 0.9 % (FLUSH) 0.9 %
5-40 SYRINGE (ML) INJECTION PRN
Status: CANCELLED | OUTPATIENT
Start: 2022-10-28

## 2022-10-21 RX ORDER — SODIUM CHLORIDE 0.9 % (FLUSH) 0.9 %
5-40 SYRINGE (ML) INJECTION PRN
Status: DISCONTINUED | OUTPATIENT
Start: 2022-10-21 | End: 2022-10-22 | Stop reason: HOSPADM

## 2022-10-21 RX ORDER — DIPHENHYDRAMINE HYDROCHLORIDE 50 MG/ML
50 INJECTION INTRAMUSCULAR; INTRAVENOUS
Status: CANCELLED | OUTPATIENT
Start: 2022-10-28

## 2022-10-21 RX ORDER — ONDANSETRON 2 MG/ML
8 INJECTION INTRAMUSCULAR; INTRAVENOUS
Status: CANCELLED | OUTPATIENT
Start: 2022-10-28

## 2022-10-21 RX ORDER — ACETAMINOPHEN 325 MG/1
650 TABLET ORAL
Status: CANCELLED | OUTPATIENT
Start: 2022-10-28

## 2022-10-21 RX ORDER — EPINEPHRINE 1 MG/ML
0.3 INJECTION, SOLUTION, CONCENTRATE INTRAVENOUS PRN
Status: CANCELLED | OUTPATIENT
Start: 2022-10-28

## 2022-10-21 RX ADMIN — IRON SUCROSE 200 MG: 20 INJECTION, SOLUTION INTRAVENOUS at 14:08

## 2022-10-21 RX ADMIN — Medication 10 ML: at 14:31

## 2022-10-21 RX ADMIN — Medication 10 ML: at 14:00

## 2022-10-21 NOTE — PROGRESS NOTES
134/88 -- -- 68 16 --   10/21/22 1345 (!) 155/90 97.2 °F (36.2 °C) Infrared 66 16 97 %       Current Lab Data:  Hemoglobin/Hematocrit:    Lab Results   Component Value Date/Time    HGB 11.0 04/08/2022 10:08 AM    HCT 35.7 04/08/2022 10:08 AM     IRON:    Lab Results   Component Value Date/Time    IRON 23 04/14/2022 11:13 AM     Iron Saturation:    Lab Results   Component Value Date/Time    LABIRON 6 04/14/2022 11:13 AM     TIBC:    Lab Results   Component Value Date/Time    TIBC 414 04/14/2022 11:13 AM     FERRITIN:    Lab Results   Component Value Date/Time    FERRITIN 20.9 03/30/2017 09:37 AM       Dose Administered: 200 mg  Todays dose is number 1 out of 5 ordered for this patient. Response to treatment:  Well tolerated by patient. Education:    Verbalized understanding     Scheduled to return for next dose of Venofer on October 25, 2022.      Electronically signed by Suzy Buerger, RN on 10/21/2022 at 3:13 PM

## 2022-10-21 NOTE — DISCHARGE INSTRUCTIONS
Outpatient Infusion Discharge Instructions  [place] [address] [facility] [address] [address]  Telephone: [phone]    NAME:  Anthony Ureña OF BIRTH:  1950  MEDICAL RECORD NUMBER:  [id number]  DATE:  10/21/22    Reason for Outpatient Infusion Visit: Venofer dose 1 of 5    If you develop any these symptoms please contact you Doctor    [] Nausea and/or vomiting not relieved with medication   [x] Swelling, redness, and/or bleeding at injection or IV site    [] Fever or chills  [] Rash or itching   [] Shortness of breath  [x] Please review After Visit Summary (AVS) information on Venofer   (iron sucrose)  [] Other      Outpatient Infusion Center Information: Should you experience any significant changes in your health or have questions about your care please contact the [address] at [address] 8:00 am - 4:00 pm.  If you need help outside these hours and cannot wait until we are again available, contact your Primary Care Physician or go to the hospital emergency room.        Electronically signed by Veronica Cabrera RN on 10/21/2022 at 2:21 PM

## 2022-10-25 ENCOUNTER — HOSPITAL ENCOUNTER (OUTPATIENT)
Dept: INFUSION THERAPY | Age: 72
Setting detail: INFUSION SERIES
Discharge: HOME | End: 2022-10-25
Payer: MEDICARE

## 2022-10-25 VITALS
RESPIRATION RATE: 16 BRPM | TEMPERATURE: 98.3 F | DIASTOLIC BLOOD PRESSURE: 81 MMHG | SYSTOLIC BLOOD PRESSURE: 127 MMHG | HEART RATE: 79 BPM

## 2022-10-25 DIAGNOSIS — D50.8 OTHER IRON DEFICIENCY ANEMIA: Primary | ICD-10-CM

## 2022-10-25 DIAGNOSIS — K90.49 MALABSORPTION DUE TO INTOLERANCE, NOT ELSEWHERE CLASSIFIED: ICD-10-CM

## 2022-10-25 PROCEDURE — 6360000002 HC RX W HCPCS: Performed by: INTERNAL MEDICINE

## 2022-10-25 PROCEDURE — 96374 THER/PROPH/DIAG INJ IV PUSH: CPT

## 2022-10-25 PROCEDURE — 2580000003 HC RX 258: Performed by: INTERNAL MEDICINE

## 2022-10-25 RX ORDER — EPINEPHRINE 1 MG/ML
0.3 INJECTION, SOLUTION, CONCENTRATE INTRAVENOUS PRN
Status: CANCELLED | OUTPATIENT
Start: 2022-11-01

## 2022-10-25 RX ORDER — SODIUM CHLORIDE 9 MG/ML
INJECTION, SOLUTION INTRAVENOUS CONTINUOUS
Status: CANCELLED | OUTPATIENT
Start: 2022-11-01

## 2022-10-25 RX ORDER — DIPHENHYDRAMINE HYDROCHLORIDE 50 MG/ML
50 INJECTION INTRAMUSCULAR; INTRAVENOUS
Status: CANCELLED | OUTPATIENT
Start: 2022-11-01

## 2022-10-25 RX ORDER — ONDANSETRON 2 MG/ML
8 INJECTION INTRAMUSCULAR; INTRAVENOUS
Status: CANCELLED | OUTPATIENT
Start: 2022-11-01

## 2022-10-25 RX ORDER — MEPERIDINE HYDROCHLORIDE 25 MG/ML
12.5 INJECTION INTRAMUSCULAR; INTRAVENOUS; SUBCUTANEOUS PRN
Status: CANCELLED | OUTPATIENT
Start: 2022-11-01

## 2022-10-25 RX ORDER — SODIUM CHLORIDE 0.9 % (FLUSH) 0.9 %
5-40 SYRINGE (ML) INJECTION PRN
Status: CANCELLED | OUTPATIENT
Start: 2022-11-01

## 2022-10-25 RX ORDER — SODIUM CHLORIDE 0.9 % (FLUSH) 0.9 %
5-40 SYRINGE (ML) INJECTION PRN
Status: DISCONTINUED | OUTPATIENT
Start: 2022-10-25 | End: 2022-10-26 | Stop reason: HOSPADM

## 2022-10-25 RX ORDER — ACETAMINOPHEN 325 MG/1
650 TABLET ORAL
Status: CANCELLED | OUTPATIENT
Start: 2022-11-01

## 2022-10-25 RX ORDER — ALBUTEROL SULFATE 90 UG/1
4 AEROSOL, METERED RESPIRATORY (INHALATION) PRN
Status: CANCELLED | OUTPATIENT
Start: 2022-11-01

## 2022-10-25 RX ADMIN — IRON SUCROSE 200 MG: 20 INJECTION, SOLUTION INTRAVENOUS at 13:16

## 2022-10-25 RX ADMIN — Medication 10 ML: at 13:15

## 2022-10-25 NOTE — DISCHARGE INSTRUCTIONS
Outpatient Infusion Discharge Instructions  [address] [address] [place] [facility]  Telephone: [phone]    NAME:  Karthik Galvez OF BIRTH:  1950  MEDICAL RECORD NUMBER:  [id number]  DATE:  @ED@    Reason for Outpatient Infusion Visit: ***    If you develop any these symptoms please contact you Doctor    [] Nausea and/or vomiting not relieved with medication   [] Swelling, redness, and/or bleeding at injection or IV site    [] Fever or chills  [] Rash or itching   [] Shortness of breath  [] Please review After Visit Summary (AVS) information on    [] Other      Outpatient [address]: Should you experience any significant changes in your health or have questions about your care please contact the [address] 8:00 am - 4:00 pm.  If you need help outside these hours and cannot wait until we are again available, contact your Primary Care Physician or go to the hospital emergency room.        Electronically signed by Santana Lemon RN on 10/25/2022 at 1:31 PM Outpatient Infusion Discharge Instructions  [address] [address] [address] [facility]  Telephone: [phone]    NAME:  Karthik Galvez OF BIRTH:  1950  MEDICAL RECORD NUMBER:  [id number]  DATE:  @ED@    Reason for Outpatient Infusion Visit: ***    If you develop any these symptoms please contact you Doctor    [x] Nausea and/or vomiting not relieved with medication   [x] Swelling, redness, and/or bleeding at injection or IV site    [x] Fever or chills  [x] Rash or itching   [x] Shortness of breath  [] Please review After Visit Summary (AVS) information on    [] Other      Outpatient [address]: Should you experience any significant changes in your health or have questions about your care please contact the [facility]fusion [facility] at [address] 8:00 am - 4:00 pm.  If you need help outside these hours and cannot wait until we are again available, contact your Primary Care Physician or go to the hospital emergency room.        Electronically signed by Rossana Verde RN on 10/25/2022 at 1:31 PM

## 2022-10-25 NOTE — PROGRESS NOTES
[address] [facility] [place]     Venofer Visit    NAME:  Jacqueline Tamayo  YOB: 1950  MEDICAL RECORD NUMBER:  [id number]  Episode Date:  10/25/2022    Patient arrived to Outpatient [facility]   [] per wheelchair   [x] ambulatory     Has the patient had a previous problem with Venofer Infusion? Yes   Any current infection or illness? No   Patient on antibiotics? No   History of Hypertension?  No     /81   Pulse 79   Temp 98.3 °F (36.8 °C) (Oral)   Resp 16   Patient Vitals for the past 2 hrs:   BP Temp Temp src Pulse Resp   10/25/22 1318 127/81 98.3 °F (36.8 °C) Oral 79 16       Is the patient experiencing any:  Fatigue:   []   None  [x]   Increase over baseline but not altering normal activities  []   Moderate of causing difficulty performing some activities  []   Severe or loss of ability to perform some activities  []   Bedridden or disabling     Dizziness or Lightheadedness:   [x]   None  []   No Interference  []   Interferes with functioning but not activities of daily living  []   Interferes with daily activies  []   Bedridden or disabling    Shortness of Breath:   [x]   None   []   Dyspneic on exertion   []   Dyspnea with normal activities  []   Dyspnea at rest    Edema: none Location of edema: nothing    Tachycardia: No    Heart Palpitations: No      Chest Pain: No      /81   Pulse 79   Temp 98.3 °F (36.8 °C) (Oral)   Resp 16   Patient Vitals for the past 2 hrs:   BP Temp Temp src Pulse Resp   10/25/22 1318 127/81 98.3 °F (36.8 °C) Oral 79 16       Current Lab Data:  Hemoglobin/Hematocrit:    Lab Results   Component Value Date/Time    HGB 11.0 04/08/2022 10:08 AM    HCT 35.7 04/08/2022 10:08 AM     IRON:    Lab Results   Component Value Date/Time    IRON 23 04/14/2022 11:13 AM     Iron Saturation:    Lab Results   Component Value Date/Time    LABIRON 6 04/14/2022 11:13 AM     TIBC:    Lab Results   Component Value Date/Time    TIBC 414

## 2022-10-26 RX ORDER — ROPINIROLE 1 MG/1
TABLET, FILM COATED ORAL
Qty: 90 TABLET | Refills: 1 | Status: SHIPPED | OUTPATIENT
Start: 2022-10-26

## 2022-11-01 ENCOUNTER — HOSPITAL ENCOUNTER (OUTPATIENT)
Dept: INFUSION THERAPY | Age: 72
Setting detail: INFUSION SERIES
Discharge: HOME | End: 2022-11-01
Payer: MEDICARE

## 2022-11-01 VITALS
OXYGEN SATURATION: 98 % | HEART RATE: 69 BPM | DIASTOLIC BLOOD PRESSURE: 69 MMHG | TEMPERATURE: 98.1 F | RESPIRATION RATE: 17 BRPM | SYSTOLIC BLOOD PRESSURE: 180 MMHG

## 2022-11-01 DIAGNOSIS — K90.49 MALABSORPTION DUE TO INTOLERANCE, NOT ELSEWHERE CLASSIFIED: ICD-10-CM

## 2022-11-01 DIAGNOSIS — D50.8 OTHER IRON DEFICIENCY ANEMIA: Primary | ICD-10-CM

## 2022-11-01 LAB
BASOPHILS ABSOLUTE: 0.1 K/UL (ref 0–0.2)
BASOPHILS RELATIVE PERCENT: 1 %
EOSINOPHILS ABSOLUTE: 0.2 K/UL (ref 0–0.6)
EOSINOPHILS RELATIVE PERCENT: 2.7 %
HCT VFR BLD CALC: 43.9 % (ref 36–48)
HEMOGLOBIN: 14.9 G/DL (ref 12–16)
LYMPHOCYTES ABSOLUTE: 1.7 K/UL (ref 1–5.1)
LYMPHOCYTES RELATIVE PERCENT: 23.3 %
MCH RBC QN AUTO: 29.9 PG (ref 26–34)
MCHC RBC AUTO-ENTMCNC: 33.9 G/DL (ref 31–36)
MCV RBC AUTO: 88.4 FL (ref 80–100)
MONOCYTES ABSOLUTE: 0.6 K/UL (ref 0–1.3)
MONOCYTES RELATIVE PERCENT: 8.5 %
NEUTROPHILS ABSOLUTE: 4.8 K/UL (ref 1.7–7.7)
NEUTROPHILS RELATIVE PERCENT: 64.5 %
PDW BLD-RTO: 17 % (ref 12.4–15.4)
PLATELET # BLD: 280 K/UL (ref 135–450)
PMV BLD AUTO: 9.5 FL (ref 5–10.5)
RBC # BLD: 4.96 M/UL (ref 4–5.2)
WBC # BLD: 7.5 K/UL (ref 4–11)

## 2022-11-01 PROCEDURE — 96374 THER/PROPH/DIAG INJ IV PUSH: CPT

## 2022-11-01 PROCEDURE — 2580000003 HC RX 258: Performed by: INTERNAL MEDICINE

## 2022-11-01 PROCEDURE — 6360000002 HC RX W HCPCS: Performed by: INTERNAL MEDICINE

## 2022-11-01 RX ORDER — SODIUM CHLORIDE 0.9 % (FLUSH) 0.9 %
5-40 SYRINGE (ML) INJECTION PRN
Status: DISCONTINUED | OUTPATIENT
Start: 2022-11-01 | End: 2022-11-02 | Stop reason: HOSPADM

## 2022-11-01 RX ORDER — DIPHENHYDRAMINE HYDROCHLORIDE 50 MG/ML
50 INJECTION INTRAMUSCULAR; INTRAVENOUS
Status: CANCELLED | OUTPATIENT
Start: 2022-11-08

## 2022-11-01 RX ORDER — EPINEPHRINE 1 MG/ML
0.3 INJECTION, SOLUTION, CONCENTRATE INTRAVENOUS PRN
Status: CANCELLED | OUTPATIENT
Start: 2022-11-08

## 2022-11-01 RX ORDER — SODIUM CHLORIDE 9 MG/ML
INJECTION, SOLUTION INTRAVENOUS CONTINUOUS
Status: CANCELLED | OUTPATIENT
Start: 2022-11-08

## 2022-11-01 RX ORDER — ONDANSETRON 2 MG/ML
8 INJECTION INTRAMUSCULAR; INTRAVENOUS
Status: CANCELLED | OUTPATIENT
Start: 2022-11-08

## 2022-11-01 RX ORDER — ACETAMINOPHEN 325 MG/1
650 TABLET ORAL
Status: CANCELLED | OUTPATIENT
Start: 2022-11-08

## 2022-11-01 RX ORDER — MEPERIDINE HYDROCHLORIDE 25 MG/ML
12.5 INJECTION INTRAMUSCULAR; INTRAVENOUS; SUBCUTANEOUS PRN
Status: CANCELLED | OUTPATIENT
Start: 2022-11-08

## 2022-11-01 RX ORDER — SODIUM CHLORIDE 0.9 % (FLUSH) 0.9 %
5-40 SYRINGE (ML) INJECTION PRN
Status: CANCELLED | OUTPATIENT
Start: 2022-11-08

## 2022-11-01 RX ORDER — ALBUTEROL SULFATE 90 UG/1
4 AEROSOL, METERED RESPIRATORY (INHALATION) PRN
Status: CANCELLED | OUTPATIENT
Start: 2022-11-08

## 2022-11-01 RX ADMIN — Medication 10 ML: at 15:10

## 2022-11-01 RX ADMIN — IRON SUCROSE 200 MG: 20 INJECTION, SOLUTION INTRAVENOUS at 14:43

## 2022-11-01 RX ADMIN — Medication 10 ML: at 14:41

## 2022-11-01 NOTE — DISCHARGE INSTRUCTIONS
Outpatient Infusion Discharge Instructions  [place] [address] [facility] [address] [address], [address]  Telephone: [phone]    NAME:  Alexandria Crowe OF BIRTH:  1950  MEDICAL RECORD NUMBER:  [id number]  DATE:  11/1/22    Reason for Outpatient Infusion Visit: Venofer #3    If you develop any these symptoms please contact you Doctor    [x] Nausea and/or vomiting not relieved with medication   [x] Swelling, redness, and/or bleeding at injection or IV site    [x] Fever or chills  [x] Rash or itching   [x] Shortness of breath  [x] Please review After Visit Summary (AVS) information on    [] Other      Outpatient [address]: Should you experience any significant changes in your health or have questions about your care please contact the [address] 8:00 am - 4:00 pm.  If you need help outside these hours and cannot wait until we are again available, contact your Primary Care Physician or go to the hospital emergency room.        Electronically signed by Shakila Lemon RN on 11/1/2022 at 2:57 PM

## 2022-11-01 NOTE — PROGRESS NOTES
[address]     Venofer Visit    NAME:  Saima Peck  YOB: 1950  MEDICAL RECORD NUMBER:  [id number]  Episode Date:  11/1/2022    Patient arrived to [facility]   [] per wheelchair   [x] ambulatory     Has the patient had a previous problem with Venofer Infusion? No  Any current infection or illness? No   Patient on antibiotics? No   History of Hypertension?  Yes      BP (!) 180/69   Pulse 69   Temp 98.1 °F (36.7 °C) (Oral)   Resp 17   SpO2 98%   Patient Vitals for the past 2 hrs:   BP Temp Temp src Pulse Resp SpO2   11/01/22 1509 (!) 180/69 -- -- 69 17 --   11/01/22 1430 (!) 180/99 98.1 °F (36.7 °C) Oral 74 16 98 %     Is the patient experiencing any:  Fatigue:   []   None  [x]   Increase over baseline but not altering normal activities  []   Moderate of causing difficulty performing some activities  []   Severe or loss of ability to perform some activities  []   Bedridden or disabling     Dizziness or Lightheadedness:   []   None  [x]   No Interference  []   Interferes with functioning but not activities of daily living  []   Interferes with daily activies  []   Bedridden or disabling    Shortness of Breath:   []   None   [x]   Dyspneic on exertion   []   Dyspnea with normal activities  []   Dyspnea at rest    Edema: none     Tachycardia: No    Heart Palpitations: No      Chest Pain: No      BP (!) 180/69   Pulse 69   Temp 98.1 °F (36.7 °C) (Oral)   Resp 17   SpO2 98%   Patient Vitals for the past 2 hrs:   BP Temp Temp src Pulse Resp SpO2   11/01/22 1509 (!) 180/69 -- -- 69 17 --   11/01/22 1430 (!) 180/99 98.1 °F (36.7 °C) Oral 74 16 98 %       Current Lab Data:  Hemoglobin/Hematocrit:    Lab Results   Component Value Date/Time    HGB 11.0 04/08/2022 10:08 AM    HCT 35.7 04/08/2022 10:08 AM     IRON:    Lab Results   Component Value Date/Time    IRON 23 04/14/2022 11:13 AM     Iron Saturation:    Lab Results   Component Value Date/Time    LABIRON 6 04/14/2022 11:13 AM     TIBC:    Lab Results   Component Value Date/Time    TIBC 414 04/14/2022 11:13 AM     FERRITIN:    Lab Results   Component Value Date/Time    FERRITIN 20.9 03/30/2017 09:37 AM       Dose Administered: 200 mg  Todays dose is number 3 out of 5 ordered for this patient. Response to treatment:  Well tolerated by patient. Education:    Verbalized understanding     Scheduled to return for next dose of Venofer on November 8, 2022.      Electronically signed by Dot Perez RN on 11/1/2022 at 3:14 PM

## 2022-11-08 ENCOUNTER — HOSPITAL ENCOUNTER (OUTPATIENT)
Dept: INFUSION THERAPY | Age: 72
Setting detail: INFUSION SERIES
Discharge: HOME | End: 2022-11-08
Payer: MEDICARE

## 2022-11-08 VITALS
HEART RATE: 80 BPM | TEMPERATURE: 98.1 F | DIASTOLIC BLOOD PRESSURE: 89 MMHG | RESPIRATION RATE: 16 BRPM | SYSTOLIC BLOOD PRESSURE: 162 MMHG

## 2022-11-08 DIAGNOSIS — D50.8 OTHER IRON DEFICIENCY ANEMIA: Primary | ICD-10-CM

## 2022-11-08 DIAGNOSIS — K90.49 MALABSORPTION DUE TO INTOLERANCE, NOT ELSEWHERE CLASSIFIED: ICD-10-CM

## 2022-11-08 PROCEDURE — 6360000002 HC RX W HCPCS: Performed by: INTERNAL MEDICINE

## 2022-11-08 PROCEDURE — 96374 THER/PROPH/DIAG INJ IV PUSH: CPT

## 2022-11-08 RX ORDER — SODIUM CHLORIDE 9 MG/ML
INJECTION, SOLUTION INTRAVENOUS CONTINUOUS
Status: CANCELLED | OUTPATIENT
Start: 2022-11-15

## 2022-11-08 RX ORDER — ACETAMINOPHEN 325 MG/1
650 TABLET ORAL
Status: CANCELLED | OUTPATIENT
Start: 2022-11-15

## 2022-11-08 RX ORDER — SODIUM CHLORIDE 0.9 % (FLUSH) 0.9 %
5-40 SYRINGE (ML) INJECTION PRN
Status: CANCELLED | OUTPATIENT
Start: 2022-11-15

## 2022-11-08 RX ORDER — ONDANSETRON 2 MG/ML
8 INJECTION INTRAMUSCULAR; INTRAVENOUS
Status: CANCELLED | OUTPATIENT
Start: 2022-11-15

## 2022-11-08 RX ORDER — EPINEPHRINE 1 MG/ML
0.3 INJECTION, SOLUTION, CONCENTRATE INTRAVENOUS PRN
Status: CANCELLED | OUTPATIENT
Start: 2022-11-15

## 2022-11-08 RX ORDER — ALBUTEROL SULFATE 90 UG/1
4 AEROSOL, METERED RESPIRATORY (INHALATION) PRN
Status: CANCELLED | OUTPATIENT
Start: 2022-11-15

## 2022-11-08 RX ORDER — DIPHENHYDRAMINE HYDROCHLORIDE 50 MG/ML
50 INJECTION INTRAMUSCULAR; INTRAVENOUS
Status: CANCELLED | OUTPATIENT
Start: 2022-11-15

## 2022-11-08 RX ORDER — MEPERIDINE HYDROCHLORIDE 25 MG/ML
12.5 INJECTION INTRAMUSCULAR; INTRAVENOUS; SUBCUTANEOUS PRN
Status: CANCELLED | OUTPATIENT
Start: 2022-11-15

## 2022-11-08 RX ADMIN — IRON SUCROSE 200 MG: 20 INJECTION, SOLUTION INTRAVENOUS at 13:31

## 2022-11-08 NOTE — DISCHARGE INSTRUCTIONS
Outpatient Infusion Discharge Instructions  [facility]  [address] [address] [address] [facility]  Telephone: [phone]    NAME:  Catie Abreu OF BIRTH:  1950  MEDICAL RECORD NUMBER:  [id number]  DATE:  @ED@    Reason for Outpatient Infusion Visit: ***    If you develop any these symptoms please contact you Doctor    [] Nausea and/or vomiting not relieved with medication   [] Swelling, redness, and/or bleeding at injection or IV site    [] Fever or chills  [] Rash or itching   [] Shortness of breath  [] Please review After Visit Summary (AVS) information on    [] Other      Outpatient [address]: Should you experience any significant changes in your health or have questions about your care please contact the [facility] [address] [facility] 8:00 am - 4:00 pm.  If you need help outside these hours and cannot wait until we are again available, contact your Primary Care Physician or go to the hospital emergency room.        Electronically signed by Halima Sue RN on 11/8/2022 at 1:45 PM Outpatient Infusion Discharge Instructions  [facility]  [address] [address] [address] [facility] [address], [facility]  Telephone: [phone]    NAME:  Catie Abreu OF BIRTH:  1950  MEDICAL RECORD NUMBER:  [id number]  DATE:  @ED@    Reason for Outpatient Infusion Visit: ***    If you develop any these symptoms please contact you Doctor    [x] Nausea and/or vomiting not relieved with medication   [x] Swelling, redness, and/or bleeding at injection or IV site    [x] Fever or chills  [x] Rash or itching   [x] Shortness of breath  [] Please review After Visit Summary (AVS) information on    [] Other      Outpatient [address]: Should you experience any significant changes in your health or have questions about your care please contact the [facility] at [address] [facility] [address] 8:00 am - 4:00 pm.  If you need help outside these hours and cannot wait until we are again available, contact your Primary Care Physician or go to the hospital emergency room.        Electronically signed by Evelyne Fish RN on 11/8/2022 at 1:45 PM

## 2022-11-08 NOTE — PROGRESS NOTES
[address] [facility]     Venofer Visit    NAME:  Isaiah Dillon  YOB: 1950  MEDICAL RECORD NUMBER:  [id number]  Episode Date:  11/8/2022    Patient arrived to [facility] 58   [] per wheelchair   [x] ambulatory     Has the patient had a previous problem with Venofer Infusion? No  Any current infection or illness? No   Patient on antibiotics? No   History of Hypertension?  No     BP (!) 162/89   Pulse 80   Temp 98.1 °F (36.7 °C) (Oral)   Resp 16   Patient Vitals for the past 2 hrs:   BP Temp Temp src Pulse Resp   11/08/22 1335 (!) 162/89 98.1 °F (36.7 °C) Oral 80 16       Is the patient experiencing any:  Fatigue:   []   None  [x]   Increase over baseline but not altering normal activities  []   Moderate of causing difficulty performing some activities  []   Severe or loss of ability to perform some activities  []   Bedridden or disabling     Dizziness or Lightheadedness:   [x]   None  []   No Interference  []   Interferes with functioning but not activities of daily living  []   Interferes with daily activies  []   Bedridden or disabling    Shortness of Breath:   [x]   None   []   Dyspneic on exertion   []   Dyspnea with normal activities  []   Dyspnea at rest    Edema: none Location of edema: nothing    Tachycardia: No    Heart Palpitations: No      Chest Pain: No      BP (!) 162/89   Pulse 80   Temp 98.1 °F (36.7 °C) (Oral)   Resp 16   Patient Vitals for the past 2 hrs:   BP Temp Temp src Pulse Resp   11/08/22 1335 (!) 162/89 98.1 °F (36.7 °C) Oral 80 16       Current Lab Data:  Hemoglobin/Hematocrit:    Lab Results   Component Value Date/Time    HGB 14.9 11/01/2022 03:48 PM    HCT 43.9 11/01/2022 03:48 PM     IRON:    Lab Results   Component Value Date/Time    IRON 23 04/14/2022 11:13 AM     Iron Saturation:    Lab Results   Component Value Date/Time    LABIRON 6 04/14/2022 11:13 AM     TIBC:    Lab Results   Component Value Date/Time    TIBC 414 04/14/2022 11:13 AM     FERRITIN:    Lab Results   Component Value Date/Time    FERRITIN 20.9 03/30/2017 09:37 AM       Dose Administered: 200 mg  Todays dose is number 4 out of 5 ordered for this patient. Response to treatment:  Well tolerated by patient. Education:    Indicates understanding     Scheduled to return for next dose of Venofer on November 15, 2022.      Electronically signed by Alexander Abad RN on 11/8/2022 at 1:39 PM

## 2022-11-15 ENCOUNTER — HOSPITAL ENCOUNTER (OUTPATIENT)
Dept: INFUSION THERAPY | Age: 72
Setting detail: INFUSION SERIES
Discharge: HOME | End: 2022-11-15
Payer: MEDICARE

## 2022-11-15 VITALS
BODY MASS INDEX: 37.03 KG/M2 | OXYGEN SATURATION: 98 % | HEART RATE: 86 BPM | TEMPERATURE: 97.7 F | SYSTOLIC BLOOD PRESSURE: 153 MMHG | DIASTOLIC BLOOD PRESSURE: 91 MMHG | WEIGHT: 250 LBS | RESPIRATION RATE: 17 BRPM | HEIGHT: 69 IN

## 2022-11-15 DIAGNOSIS — K90.49 MALABSORPTION DUE TO INTOLERANCE, NOT ELSEWHERE CLASSIFIED: ICD-10-CM

## 2022-11-15 DIAGNOSIS — D50.8 OTHER IRON DEFICIENCY ANEMIA: Primary | ICD-10-CM

## 2022-11-15 PROCEDURE — 6360000002 HC RX W HCPCS: Performed by: INTERNAL MEDICINE

## 2022-11-15 PROCEDURE — 96374 THER/PROPH/DIAG INJ IV PUSH: CPT

## 2022-11-15 RX ORDER — ACETAMINOPHEN 325 MG/1
650 TABLET ORAL
Status: CANCELLED | OUTPATIENT
Start: 2022-11-22

## 2022-11-15 RX ORDER — MEPERIDINE HYDROCHLORIDE 25 MG/ML
12.5 INJECTION INTRAMUSCULAR; INTRAVENOUS; SUBCUTANEOUS PRN
Status: CANCELLED | OUTPATIENT
Start: 2022-11-22

## 2022-11-15 RX ORDER — DIPHENHYDRAMINE HYDROCHLORIDE 50 MG/ML
50 INJECTION INTRAMUSCULAR; INTRAVENOUS
Status: CANCELLED | OUTPATIENT
Start: 2022-11-22

## 2022-11-15 RX ORDER — SODIUM CHLORIDE 0.9 % (FLUSH) 0.9 %
5-40 SYRINGE (ML) INJECTION PRN
Status: CANCELLED | OUTPATIENT
Start: 2022-11-22

## 2022-11-15 RX ORDER — ONDANSETRON 2 MG/ML
8 INJECTION INTRAMUSCULAR; INTRAVENOUS
Status: CANCELLED | OUTPATIENT
Start: 2022-11-22

## 2022-11-15 RX ORDER — ALBUTEROL SULFATE 90 UG/1
4 AEROSOL, METERED RESPIRATORY (INHALATION) PRN
Status: CANCELLED | OUTPATIENT
Start: 2022-11-22

## 2022-11-15 RX ORDER — SODIUM CHLORIDE 9 MG/ML
INJECTION, SOLUTION INTRAVENOUS CONTINUOUS
Status: CANCELLED | OUTPATIENT
Start: 2022-11-22

## 2022-11-15 RX ORDER — EPINEPHRINE 1 MG/ML
0.3 INJECTION, SOLUTION, CONCENTRATE INTRAVENOUS PRN
Status: CANCELLED | OUTPATIENT
Start: 2022-11-22

## 2022-11-15 RX ADMIN — IRON SUCROSE 200 MG: 20 INJECTION, SOLUTION INTRAVENOUS at 13:06

## 2022-11-15 NOTE — PROGRESS NOTES
[facility]     Venofer Visit    NAME:  Jennifer Marx  YOB: 1950  MEDICAL RECORD NUMBER:  [id number]  Episode Date:  11/15/2022    Patient arrived to [facility] 58   [] per wheelchair   [x] ambulatory     Has the patient had a previous problem with Venofer Infusion? No  Any current infection or illness? No   Patient on antibiotics? No   History of Hypertension?  No     BP (!) 153/91   Pulse 86   Temp 97.7 °F (36.5 °C) (Oral)   Resp 17   Ht 5' 9\" (1.753 m)   Wt 250 lb (113.4 kg)   SpO2 98%   BMI 36.92 kg/m²   Patient Vitals for the past 2 hrs:   BP Temp Temp src Pulse Resp SpO2 Height Weight   11/15/22 1254 (!) 153/91 97.7 °F (36.5 °C) Oral 86 17 98 % 5' 9\" (1.753 m) 250 lb (113.4 kg)       Is the patient experiencing any:  Fatigue: yes  []   None  []   Increase over baseline but not altering normal activities  [x]   Moderate of causing difficulty performing some activities  []   Severe or loss of ability to perform some activities  []   Bedridden or disabling     Dizziness or Lightheadedness: yes  []   None  [x]   No Interference  []   Interferes with functioning but not activities of daily living  []   Interferes with daily activies  []   Bedridden or disabling    Shortness of Breath: yes  []   None   [x]   Dyspneic on exertion   []   Dyspnea with normal activities  []   Dyspnea at rest    Edema: none Location of edema: nothing    Tachycardia: No    Heart Palpitations: No      Chest Pain: No      BP (!) 153/91   Pulse 86   Temp 97.7 °F (36.5 °C) (Oral)   Resp 17   Ht 5' 9\" (1.753 m)   Wt 250 lb (113.4 kg)   SpO2 98%   BMI 36.92 kg/m²   Patient Vitals for the past 2 hrs:   BP Temp Temp src Pulse Resp SpO2 Height Weight   11/15/22 1254 (!) 153/91 97.7 °F (36.5 °C) Oral 86 17 98 % 5' 9\" (1.753 m) 250 lb (113.4 kg)       Current Lab Data:  Hemoglobin/Hematocrit:    Lab Results   Component Value Date/Time    HGB 14.9 11/01/2022 03:48 PM    HCT 43.9 11/01/2022 03:48 PM     IRON:    Lab Results   Component Value Date/Time    IRON 23 04/14/2022 11:13 AM     Iron Saturation:    Lab Results   Component Value Date/Time    LABIRON 6 04/14/2022 11:13 AM     TIBC:    Lab Results   Component Value Date/Time    TIBC 414 04/14/2022 11:13 AM     FERRITIN:    Lab Results   Component Value Date/Time    FERRITIN 20.9 03/30/2017 09:37 AM       Dose Administered: 200 mg  Todays dose is number 5 out of 5 ordered for this patient. Response to treatment:  Well tolerated by patient. First IV site infiltrated while pushing venofer. Small hematoma at this site. Ice applied and new IV started in right hand.       Education:    Indicates understanding     Scheduled to return for next dose of Venofer last dose     Electronically signed by Jory Webb RN on 11/15/2022 at 12:58 PM

## 2022-11-16 RX ORDER — LEVOTHYROXINE SODIUM 0.1 MG/1
TABLET ORAL
Qty: 90 TABLET | Refills: 1 | Status: SHIPPED | OUTPATIENT
Start: 2022-11-16

## 2022-11-16 RX ORDER — ATORVASTATIN CALCIUM 10 MG/1
TABLET, FILM COATED ORAL
Qty: 90 TABLET | Refills: 1 | Status: SHIPPED | OUTPATIENT
Start: 2022-11-16

## 2022-11-16 RX ORDER — TRIAMTERENE AND HYDROCHLOROTHIAZIDE 37.5; 25 MG/1; MG/1
TABLET ORAL
Qty: 45 TABLET | Refills: 1 | Status: SHIPPED | OUTPATIENT
Start: 2022-11-16

## 2022-11-17 ENCOUNTER — OFFICE VISIT (OUTPATIENT)
Dept: INTERNAL MEDICINE CLINIC | Age: 72
End: 2022-11-17
Payer: MEDICARE

## 2022-11-17 VITALS
SYSTOLIC BLOOD PRESSURE: 150 MMHG | DIASTOLIC BLOOD PRESSURE: 110 MMHG | WEIGHT: 250.2 LBS | HEART RATE: 83 BPM | OXYGEN SATURATION: 99 % | TEMPERATURE: 97.2 F | BODY MASS INDEX: 37.06 KG/M2 | HEIGHT: 69 IN

## 2022-11-17 DIAGNOSIS — I82.890 SUPERFICIAL VEIN THROMBOSIS: Primary | ICD-10-CM

## 2022-11-17 PROCEDURE — G8427 DOCREV CUR MEDS BY ELIG CLIN: HCPCS | Performed by: STUDENT IN AN ORGANIZED HEALTH CARE EDUCATION/TRAINING PROGRAM

## 2022-11-17 PROCEDURE — G8484 FLU IMMUNIZE NO ADMIN: HCPCS | Performed by: STUDENT IN AN ORGANIZED HEALTH CARE EDUCATION/TRAINING PROGRAM

## 2022-11-17 PROCEDURE — 1123F ACP DISCUSS/DSCN MKR DOCD: CPT | Performed by: STUDENT IN AN ORGANIZED HEALTH CARE EDUCATION/TRAINING PROGRAM

## 2022-11-17 PROCEDURE — 1036F TOBACCO NON-USER: CPT | Performed by: STUDENT IN AN ORGANIZED HEALTH CARE EDUCATION/TRAINING PROGRAM

## 2022-11-17 PROCEDURE — 99212 OFFICE O/P EST SF 10 MIN: CPT | Performed by: STUDENT IN AN ORGANIZED HEALTH CARE EDUCATION/TRAINING PROGRAM

## 2022-11-17 PROCEDURE — G8417 CALC BMI ABV UP PARAM F/U: HCPCS | Performed by: STUDENT IN AN ORGANIZED HEALTH CARE EDUCATION/TRAINING PROGRAM

## 2022-11-17 PROCEDURE — G8400 PT W/DXA NO RESULTS DOC: HCPCS | Performed by: STUDENT IN AN ORGANIZED HEALTH CARE EDUCATION/TRAINING PROGRAM

## 2022-11-17 PROCEDURE — 3078F DIAST BP <80 MM HG: CPT | Performed by: STUDENT IN AN ORGANIZED HEALTH CARE EDUCATION/TRAINING PROGRAM

## 2022-11-17 PROCEDURE — 1090F PRES/ABSN URINE INCON ASSESS: CPT | Performed by: STUDENT IN AN ORGANIZED HEALTH CARE EDUCATION/TRAINING PROGRAM

## 2022-11-17 PROCEDURE — 3074F SYST BP LT 130 MM HG: CPT | Performed by: STUDENT IN AN ORGANIZED HEALTH CARE EDUCATION/TRAINING PROGRAM

## 2022-11-17 PROCEDURE — 3017F COLORECTAL CA SCREEN DOC REV: CPT | Performed by: STUDENT IN AN ORGANIZED HEALTH CARE EDUCATION/TRAINING PROGRAM

## 2022-11-17 RX ORDER — LANOLIN ALCOHOL/MO/W.PET/CERES
300 CREAM (GRAM) TOPICAL DAILY
COMMUNITY

## 2022-11-17 RX ORDER — POTASSIUM CHLORIDE 750 MG/1
10 TABLET, FILM COATED, EXTENDED RELEASE ORAL DAILY PRN
COMMUNITY
Start: 2022-07-04

## 2022-11-17 RX ORDER — FUROSEMIDE 20 MG/1
20 TABLET ORAL DAILY PRN
COMMUNITY
Start: 2022-07-28

## 2022-11-17 SDOH — ECONOMIC STABILITY: FOOD INSECURITY: WITHIN THE PAST 12 MONTHS, THE FOOD YOU BOUGHT JUST DIDN'T LAST AND YOU DIDN'T HAVE MONEY TO GET MORE.: NEVER TRUE

## 2022-11-17 SDOH — ECONOMIC STABILITY: FOOD INSECURITY: WITHIN THE PAST 12 MONTHS, YOU WORRIED THAT YOUR FOOD WOULD RUN OUT BEFORE YOU GOT MONEY TO BUY MORE.: NEVER TRUE

## 2022-11-17 ASSESSMENT — PATIENT HEALTH QUESTIONNAIRE - PHQ9
4. FEELING TIRED OR HAVING LITTLE ENERGY: 0
SUM OF ALL RESPONSES TO PHQ9 QUESTIONS 1 & 2: 0
1. LITTLE INTEREST OR PLEASURE IN DOING THINGS: 0
7. TROUBLE CONCENTRATING ON THINGS, SUCH AS READING THE NEWSPAPER OR WATCHING TELEVISION: 0
SUM OF ALL RESPONSES TO PHQ QUESTIONS 1-9: 0
SUM OF ALL RESPONSES TO PHQ QUESTIONS 1-9: 0
6. FEELING BAD ABOUT YOURSELF - OR THAT YOU ARE A FAILURE OR HAVE LET YOURSELF OR YOUR FAMILY DOWN: 0
8. MOVING OR SPEAKING SO SLOWLY THAT OTHER PEOPLE COULD HAVE NOTICED. OR THE OPPOSITE, BEING SO FIGETY OR RESTLESS THAT YOU HAVE BEEN MOVING AROUND A LOT MORE THAN USUAL: 0
SUM OF ALL RESPONSES TO PHQ QUESTIONS 1-9: 0
2. FEELING DOWN, DEPRESSED OR HOPELESS: 0
9. THOUGHTS THAT YOU WOULD BE BETTER OFF DEAD, OR OF HURTING YOURSELF: 0
3. TROUBLE FALLING OR STAYING ASLEEP: 0
10. IF YOU CHECKED OFF ANY PROBLEMS, HOW DIFFICULT HAVE THESE PROBLEMS MADE IT FOR YOU TO DO YOUR WORK, TAKE CARE OF THINGS AT HOME, OR GET ALONG WITH OTHER PEOPLE: 0
SUM OF ALL RESPONSES TO PHQ QUESTIONS 1-9: 0
5. POOR APPETITE OR OVEREATING: 0

## 2022-11-17 ASSESSMENT — ENCOUNTER SYMPTOMS
CHEST TIGHTNESS: 0
NAUSEA: 0
ABDOMINAL DISTENTION: 0
SHORTNESS OF BREATH: 0
PHOTOPHOBIA: 0
CHOKING: 0
APNEA: 0
COUGH: 0
TROUBLE SWALLOWING: 0
WHEEZING: 0
ABDOMINAL PAIN: 0
STRIDOR: 0
VOMITING: 0
VOICE CHANGE: 0
DIARRHEA: 0
CONSTIPATION: 0

## 2022-11-17 ASSESSMENT — SOCIAL DETERMINANTS OF HEALTH (SDOH): HOW HARD IS IT FOR YOU TO PAY FOR THE VERY BASICS LIKE FOOD, HOUSING, MEDICAL CARE, AND HEATING?: NOT HARD AT ALL

## 2022-11-17 NOTE — ASSESSMENT & PLAN NOTE
Superficial vein thrombosis on the left upper extremity developed after IV iron transfusion  Supportive care for now  Continue to monitor for change in size and any sign of infection

## 2022-11-17 NOTE — PROGRESS NOTES
[facility] Internal Medicine  Acute visit   2022    Jacqueline Tamayo (:  1950) is a 70 y.o. female, here for evaluation of the following medical concerns:    Chief Complaint   Patient presents with    Mass     Lump and bruise on both forearms        ASSESSMENT/ PLAN:  Superficial vein thrombosis  Superficial vein thrombosis on the left upper extremity developed after IV iron transfusion  Supportive care for now  Continue to monitor for change in size and any sign of infection           There are no discontinued medications. Return for Follow up with Dr. Alexis Márquez . HPI  Patient here with a concern of lump and bruise on her left forearm  She presented to the office with her granddaughter  Recently she has been undergoing work-up for iron deficiency anemia without overt GI bleed. She underwent 5 different iron transfusion. Most of the time we will get her transfusion on the right forearm, which resulted in a small bruise. About 2 to 3 days ago, her last infusion was on her left forearm. Doing transfusion she was told that her IV line might be blown. After his infusion she started noticed a small bump which has not changed in size. So should it with no tenderness  Had this before. Of note she did have a history of DVT prior in the past treated with anticoagulation.    Denies any pain at the site, fever, chill, change in size      HISTORIES  Current Outpatient Medications on File Prior to Visit   Medication Sig Dispense Refill    Biotin 300 MCG TABS Take 300 mcg by mouth daily      furosemide (LASIX) 20 MG tablet Take 20 mg by mouth daily as needed      potassium chloride (KLOR-CON) 10 MEQ extended release tablet Take 10 mEq by mouth daily as needed      levothyroxine (SYNTHROID) 100 MCG tablet TAKE 1 TABLET BY MOUTH EVERY DAY 90 tablet 1    triamterene-hydroCHLOROthiazide (MAXZIDE-25) 37.5-25 MG per tablet TAKE 1/2 TABLET BY MOUTH EVERY DAY 45 tablet 1    atorvastatin (LIPITOR) 10 MG tablet TAKE 1 TABLET BY MOUTH EVERY DAY 90 tablet 1    rOPINIRole (REQUIP) 1 MG tablet TAKE 1 TABLET BY MOUTH EVERY DAY 90 tablet 1    HYDROcodone-acetaminophen (NORCO) 5-325 MG per tablet Take 1 tablet by mouth every 8 (eight) hours for 30 days. 90 tablet 0    HYDROcodone-acetaminophen (NORCO) 5-325 MG per tablet Take 1 tablet by mouth every 8 (eight) hours for 30 days. 90 tablet 0    allopurinol (ZYLOPRIM) 100 MG tablet TAKE 1 TABLET BY MOUTH EVERY DAY 90 tablet 3    Cholecalciferol (VITAMIN D3) 50 MCG (2000 UT) CAPS Take by mouth daily      Multiple Vitamins-Minerals (THERAPEUTIC MULTIVITAMIN-MINERALS) tablet Take 1 tablet by mouth daily      metFORMIN (GLUCOPHAGE-XR) 500 MG extended release tablet TAKE 1 TABLET BY MOUTH EVERY DAY 90 tablet 3    albuterol sulfate  (90 Base) MCG/ACT inhaler Inhale 2 puffs into the lungs every 4 hours as needed for Wheezing or Shortness of Breath 1 each 2    ketoconazole (NIZORAL) 2 % cream Apply topically daily. (Patient taking differently: daily as needed) 30 g 1     No current facility-administered medications on file prior to visit.       Allergies   Allergen Reactions    Warfarin And Related Hives     Past Medical History:   Diagnosis Date    Anemia     Arthritis     Asthma     CAD (coronary artery disease)     nonobstructive    Diabetes mellitus ([address]     Hiatal hernia     Hyperlipidemia     Hypertension     Hypothyroidism     Iron deficiency anemia, unspecified 9/14/2022    Malabsorption due to intolerance, not elsewhere classified 9/14/2022    [facility] ([facility]     Obesity 2010    bypass    ADILENE (obstructive sleep apnea)     does not use Cpap machine    Restless leg syndrome     SSS (sick sinus syndrome) ([facility] 2012    dual chamber pacer - [facility]     Patient Active Problem List   Diagnosis    Hypothyroidism    Hypertension    Restless leg syndrome    Depression    Type 2 diabetes mellitus without complication (HCC)    ADILENE (obstructive sleep apnea)    CAD (coronary artery disease) Anemia    DVT of leg (deep venous thrombosis) (HCC)    Pyuria    Renal insufficiency    Orthostatic hypotension    Thyroid nodule    Hyperlipidemia, mixed    Degeneration of lumbar or lumbosacral intervertebral disc    Displacement of lumbar intervertebral disc without myelopathy    Lumbosacral spondylosis without myelopathy    Lumbar degenerative disc disease    Spinal stenosis of lumbar region    Disc displacement, lumbar    Lumbar spondylosis    Hyperuricemia    Pacemaker    Major depressive disorder, single episode, in partial remission ([address]    Iron deficiency anemia, unspecified    Malabsorption due to intolerance, not elsewhere classified    Superficial vein thrombosis     Past Surgical History:   Procedure Laterality Date    BREAST BIOPSY Left 2000    benign    CATARACT REMOVAL WITH IMPLANT Bilateral     CERVICAL SPINE SURGERY  2006    fusion    CHOLECYSTECTOMY      COLONOSCOPY  2014    COLONOSCOPY N/A 7/19/2022    COLONOSCOPY performed by Graciela Frias MD at [facility] Bilateral 12/19/2019    BILATERAL LUMBAR THREE, LUMBAR FOUR, LUMBAR FIVE RADIOFREQUENCY ABLATION SITE CONFIRMED BY FLUOROSCOPY performed by Brianna Stevens MD at [address] [place] [address] (CERVIX STATUS UNKNOWN)      PACEMAKER INSERTION      PACEMAKER PLACEMENT  2012 and 2022    medtronic    PAIN MANAGEMENT PROCEDURE Bilateral 8/26/2021    BILATERAL LUMBAR THREE LUMBAR FOUR LUMBAR FIVE RADIOFREQUENCY ABLATION SITE CONFIRMED BY FLUOROSCOPY performed by Brianna Stevens MD at [address] [facility]  2010    SKIN CANCER EXCISION Right 2012    melanoma right forearm     TONSILLECTOMY      TOTAL KNEE ARTHROPLASTY Left 2016    TOTAL KNEE ARTHROPLASTY Right 10/2017    Dr Abdulkadir Garibay N/A 7/19/2022    ESOPHAGOGASTRODUODENOSCOPY performed by Graciela Frias MD at [address] [place] [address] History     Socioeconomic History Marital status:      Spouse name: Not on file    Number of children: Not on file    Years of education: Not on file    Highest education level: Not on file   Occupational History    Not on file   Tobacco Use    Smoking status: Former     Packs/day: 1.50     Years: 10.00     Pack years: 15.00     Types: Cigarettes     Quit date: 26     Years since quittin.9    Smokeless tobacco: Never   Vaping Use    Vaping Use: Never used   Substance and Sexual Activity    Alcohol use: No    Drug use: Never    Sexual activity: Not on file   Other Topics Concern    Not on file   Social History Narrative    Not on file     Social Determinants of Health     Financial Resource Strain: Low Risk     Difficulty of Paying Living Expenses: Not hard at all   Food Insecurity: No Food Insecurity    Worried About Running Out of Food in the Last Year: Never true    Ran Out of Food in the Last Year: Never true   Transportation Needs: Not on file   Physical Activity: Inactive    Days of Exercise per Week: 0 days    Minutes of Exercise per Session: 0 min   Stress: Not on file   Social Connections: Not on file   Intimate Partner Violence: Not on file   Housing Stability: Not on file      Family History   Problem Relation Age of Onset    Cancer Father     Diabetes Father     Heart Disease Father     Cancer Brother     Hypertension Mother     Thyroid Disease Daughter        ROS  Review of Systems   Constitutional:  Negative for activity change, appetite change, chills, diaphoresis, fatigue, fever and unexpected weight change. HENT:  Negative for trouble swallowing and voice change. Eyes:  Negative for photophobia and visual disturbance. Respiratory:  Negative for apnea, cough, choking, chest tightness, shortness of breath, wheezing and stridor. Cardiovascular:  Negative for chest pain, palpitations and leg swelling.    Gastrointestinal:  Negative for abdominal distention, abdominal pain, constipation, diarrhea, nausea and vomiting. Genitourinary:  Negative for difficulty urinating and dysuria. Skin:  Negative for rash and wound. Neurological:  Negative for dizziness, weakness and light-headedness. Psychiatric/Behavioral:  Negative for agitation and behavioral problems. PE  Vitals:    11/17/22 1515 11/17/22 1520   BP: (!) 150/120 (!) 150/110   Site: Left Upper Arm    Position: Sitting    Pulse: 83    Temp: 97.2 °F (36.2 °C)    SpO2: 99%    Weight: 250 lb 3.2 oz (113.5 kg)    Height: 5' 9\" (1.753 m)      Estimated body mass index is 36.95 kg/m² as calculated from the following:    Height as of this encounter: 5' 9\" (1.753 m). Weight as of this encounter: 250 lb 3.2 oz (113.5 kg). Physical Exam  Vitals and nursing note reviewed. Constitutional:       General: She is not in acute distress. Appearance: Normal appearance. She is well-developed. She is not diaphoretic. HENT:      Head: Normocephalic and atraumatic. Eyes:      General: No scleral icterus. Conjunctiva/sclera: Conjunctivae normal.      Pupils: Pupils are equal, round, and reactive to light. Cardiovascular:      Rate and Rhythm: Normal rate and regular rhythm. Pulses: Normal pulses. Heart sounds: Normal heart sounds. No murmur heard. No friction rub. No gallop. Pulmonary:      Effort: Pulmonary effort is normal. No respiratory distress. Breath sounds: Normal breath sounds. No wheezing or rales. Chest:      Chest wall: No tenderness. Abdominal:      General: Bowel sounds are normal. There is no distension. Palpations: Abdomen is soft. Musculoskeletal:         General: No tenderness or deformity. Normal range of motion. Cervical back: Normal range of motion and neck supple. Skin:     General: Skin is warm and dry. Findings: Bruising (Bruising at the previous IV line insertion on the right upper extremity and left upper extremity) present.       Comments: Left forearm: Cord like structure palpated, consistent with superficial vein thrombosis secondary to IV line placement. Nontender   Neurological:      Mental Status: She is alert and oriented to person, place, and time. Cranial Nerves: No cranial nerve deficit. Sensory: No sensory deficit. Psychiatric:         Behavior: Behavior normal.         Willa Robledo MD    This dictation was generated by voice recognition computer software. Although all attempts are made to edit the dictation for accuracy, there may be errors in the transcription that are not intended.

## 2023-01-06 RX ORDER — ALBUTEROL SULFATE 90 UG/1
AEROSOL, METERED RESPIRATORY (INHALATION)
Qty: 6.7 EACH | Refills: 2 | Status: SHIPPED | OUTPATIENT
Start: 2023-01-06

## 2023-01-17 ENCOUNTER — TELEPHONE (OUTPATIENT)
Dept: INTERNAL MEDICINE CLINIC | Age: 73
End: 2023-01-17

## 2023-01-17 NOTE — TELEPHONE ENCOUNTER
----- Message from [facility] sent at 1/17/2023  2:52 PM EST -----  Subject: Appointment Request    Reason for Call: Established Patient Appointment needed: Semi-Routine Skin   Problem    QUESTIONS    Reason for appointment request? No appointments available during search     Additional Information for Provider? [facility] still has the lumps in her arm   from when she had her last infusion in November 2022. She would like an   appt with Dr Elba Diallo.  Please call to schedule the appt ASAP.   ---------------------------------------------------------------------------  --------------  Supriya DEWITT  [phone]; OK to leave message on voicemail  ---------------------------------------------------------------------------  --------------  SCRIPT ANSWERS  COVID Screen: Lilian Waddell

## 2023-01-31 LAB
BASOPHILS ABSOLUTE: 0.1 K/UL (ref 0–0.2)
BASOPHILS RELATIVE PERCENT: 0.9 %
EOSINOPHILS ABSOLUTE: 0.2 K/UL (ref 0–0.6)
EOSINOPHILS RELATIVE PERCENT: 3 %
FERRITIN: 226 NG/ML (ref 15–150)
HCT VFR BLD CALC: 48.4 % (ref 36–48)
HEMOGLOBIN: 15.8 G/DL (ref 12–16)
IRON SATURATION: 36 % (ref 15–50)
IRON: 99 UG/DL (ref 37–145)
LYMPHOCYTES ABSOLUTE: 1.4 K/UL (ref 1–5.1)
LYMPHOCYTES RELATIVE PERCENT: 20.5 %
MCH RBC QN AUTO: 31.6 PG (ref 26–34)
MCHC RBC AUTO-ENTMCNC: 32.8 G/DL (ref 31–36)
MCV RBC AUTO: 96.5 FL (ref 80–100)
MONOCYTES ABSOLUTE: 0.6 K/UL (ref 0–1.3)
MONOCYTES RELATIVE PERCENT: 8.6 %
NEUTROPHILS ABSOLUTE: 4.7 K/UL (ref 1.7–7.7)
NEUTROPHILS RELATIVE PERCENT: 67 %
PDW BLD-RTO: 14.6 % (ref 12.4–15.4)
PLATELET # BLD: 328 K/UL (ref 135–450)
PMV BLD AUTO: 9.1 FL (ref 5–10.5)
RBC # BLD: 5.02 M/UL (ref 4–5.2)
TOTAL IRON BINDING CAPACITY: 277 UG/DL (ref 260–445)
WBC # BLD: 7 K/UL (ref 4–11)

## 2023-02-01 ENCOUNTER — OFFICE VISIT (OUTPATIENT)
Dept: INTERNAL MEDICINE CLINIC | Age: 73
End: 2023-02-01
Payer: MEDICARE

## 2023-02-01 VITALS
HEIGHT: 69 IN | SYSTOLIC BLOOD PRESSURE: 130 MMHG | DIASTOLIC BLOOD PRESSURE: 72 MMHG | WEIGHT: 251 LBS | BODY MASS INDEX: 37.18 KG/M2

## 2023-02-01 DIAGNOSIS — M79.644 BILATERAL THUMB PAIN: ICD-10-CM

## 2023-02-01 DIAGNOSIS — M25.649 MORNING JOINT STIFFNESS OF HAND, UNSPECIFIED LATERALITY: ICD-10-CM

## 2023-02-01 DIAGNOSIS — E11.9 TYPE 2 DIABETES MELLITUS WITHOUT COMPLICATION, WITHOUT LONG-TERM CURRENT USE OF INSULIN (HCC): ICD-10-CM

## 2023-02-01 DIAGNOSIS — I82.890 SUPERFICIAL VEIN THROMBOSIS: Primary | ICD-10-CM

## 2023-02-01 DIAGNOSIS — M67.431 GANGLION CYST OF WRIST, RIGHT: ICD-10-CM

## 2023-02-01 DIAGNOSIS — M79.645 BILATERAL THUMB PAIN: ICD-10-CM

## 2023-02-01 PROCEDURE — G8417 CALC BMI ABV UP PARAM F/U: HCPCS | Performed by: INTERNAL MEDICINE

## 2023-02-01 PROCEDURE — G8484 FLU IMMUNIZE NO ADMIN: HCPCS | Performed by: INTERNAL MEDICINE

## 2023-02-01 PROCEDURE — 2022F DILAT RTA XM EVC RTNOPTHY: CPT | Performed by: INTERNAL MEDICINE

## 2023-02-01 PROCEDURE — 3046F HEMOGLOBIN A1C LEVEL >9.0%: CPT | Performed by: INTERNAL MEDICINE

## 2023-02-01 PROCEDURE — 1036F TOBACCO NON-USER: CPT | Performed by: INTERNAL MEDICINE

## 2023-02-01 PROCEDURE — 3075F SYST BP GE 130 - 139MM HG: CPT | Performed by: INTERNAL MEDICINE

## 2023-02-01 PROCEDURE — 3017F COLORECTAL CA SCREEN DOC REV: CPT | Performed by: INTERNAL MEDICINE

## 2023-02-01 PROCEDURE — 1090F PRES/ABSN URINE INCON ASSESS: CPT | Performed by: INTERNAL MEDICINE

## 2023-02-01 PROCEDURE — G8400 PT W/DXA NO RESULTS DOC: HCPCS | Performed by: INTERNAL MEDICINE

## 2023-02-01 PROCEDURE — 3078F DIAST BP <80 MM HG: CPT | Performed by: INTERNAL MEDICINE

## 2023-02-01 PROCEDURE — G8427 DOCREV CUR MEDS BY ELIG CLIN: HCPCS | Performed by: INTERNAL MEDICINE

## 2023-02-01 PROCEDURE — 1123F ACP DISCUSS/DSCN MKR DOCD: CPT | Performed by: INTERNAL MEDICINE

## 2023-02-01 PROCEDURE — 99214 OFFICE O/P EST MOD 30 MIN: CPT | Performed by: INTERNAL MEDICINE

## 2023-02-01 SDOH — ECONOMIC STABILITY: INCOME INSECURITY: HOW HARD IS IT FOR YOU TO PAY FOR THE VERY BASICS LIKE FOOD, HOUSING, MEDICAL CARE, AND HEATING?: NOT HARD AT ALL

## 2023-02-01 SDOH — ECONOMIC STABILITY: FOOD INSECURITY: WITHIN THE PAST 12 MONTHS, YOU WORRIED THAT YOUR FOOD WOULD RUN OUT BEFORE YOU GOT MONEY TO BUY MORE.: NEVER TRUE

## 2023-02-01 SDOH — ECONOMIC STABILITY: FOOD INSECURITY: WITHIN THE PAST 12 MONTHS, THE FOOD YOU BOUGHT JUST DIDN'T LAST AND YOU DIDN'T HAVE MONEY TO GET MORE.: NEVER TRUE

## 2023-02-01 SDOH — ECONOMIC STABILITY: HOUSING INSECURITY
IN THE LAST 12 MONTHS, WAS THERE A TIME WHEN YOU DID NOT HAVE A STEADY PLACE TO SLEEP OR SLEPT IN A SHELTER (INCLUDING NOW)?: NO

## 2023-02-01 ASSESSMENT — PATIENT HEALTH QUESTIONNAIRE - PHQ9
8. MOVING OR SPEAKING SO SLOWLY THAT OTHER PEOPLE COULD HAVE NOTICED. OR THE OPPOSITE, BEING SO FIGETY OR RESTLESS THAT YOU HAVE BEEN MOVING AROUND A LOT MORE THAN USUAL: 0
6. FEELING BAD ABOUT YOURSELF - OR THAT YOU ARE A FAILURE OR HAVE LET YOURSELF OR YOUR FAMILY DOWN: 0
SUM OF ALL RESPONSES TO PHQ QUESTIONS 1-9: 0
SUM OF ALL RESPONSES TO PHQ9 QUESTIONS 1 & 2: 0
SUM OF ALL RESPONSES TO PHQ QUESTIONS 1-9: 0
1. LITTLE INTEREST OR PLEASURE IN DOING THINGS: 0
9. THOUGHTS THAT YOU WOULD BE BETTER OFF DEAD, OR OF HURTING YOURSELF: 0
7. TROUBLE CONCENTRATING ON THINGS, SUCH AS READING THE NEWSPAPER OR WATCHING TELEVISION: 0
SUM OF ALL RESPONSES TO PHQ QUESTIONS 1-9: 0
2. FEELING DOWN, DEPRESSED OR HOPELESS: 0
5. POOR APPETITE OR OVEREATING: 0
4. FEELING TIRED OR HAVING LITTLE ENERGY: 0
SUM OF ALL RESPONSES TO PHQ QUESTIONS 1-9: 0
10. IF YOU CHECKED OFF ANY PROBLEMS, HOW DIFFICULT HAVE THESE PROBLEMS MADE IT FOR YOU TO DO YOUR WORK, TAKE CARE OF THINGS AT HOME, OR GET ALONG WITH OTHER PEOPLE: 0
3. TROUBLE FALLING OR STAYING ASLEEP: 0

## 2023-02-01 NOTE — LETTER
[address]  [address], [address] [place] [address]  Phone: [phone]  Fax: [phone]    Nichelle Chowdary MD         February 1, 2023     Patient: Xochitl Police   YOB: 1950   Date of Visit: 2/1/2023       To Whom It May Concern: It is my medical opinion that Elian Nelson requires a disability parking placard for the following reasons:  She has limited walking ability due to an arthritis condition. Duration of need: 5 years    If you have any questions or concerns, please don't hesitate to call.     Sincerely,        Nichelle Chowdary MD

## 2023-02-02 LAB
A/G RATIO: 1.8 (ref 1.1–2.2)
ALBUMIN SERPL-MCNC: 4.1 G/DL (ref 3.4–5)
ALP BLD-CCNC: 105 U/L (ref 40–129)
ALT SERPL-CCNC: 23 U/L (ref 10–40)
ANION GAP SERPL CALCULATED.3IONS-SCNC: 18 MMOL/L (ref 3–16)
ANTI-NUCLEAR ANTIBODY (ANA): NEGATIVE
AST SERPL-CCNC: 28 U/L (ref 15–37)
BILIRUB SERPL-MCNC: 0.6 MG/DL (ref 0–1)
BUN BLDV-MCNC: 24 MG/DL (ref 7–20)
C-REACTIVE PROTEIN: 5.7 MG/L (ref 0–5.1)
CALCIUM SERPL-MCNC: 10.2 MG/DL (ref 8.3–10.6)
CHLORIDE BLD-SCNC: 99 MMOL/L (ref 99–110)
CO2: 25 MMOL/L (ref 21–32)
CREAT SERPL-MCNC: 0.9 MG/DL (ref 0.6–1.2)
CYCLIC CITRULLINATED PEPTIDE ANTIBODY IGG: <0.5 U/ML (ref 0–2.9)
ESTIMATED AVERAGE GLUCOSE: 148.5 MG/DL
GFR SERPL CREATININE-BSD FRML MDRD: >60 ML/MIN/{1.73_M2}
GLUCOSE BLD-MCNC: 130 MG/DL (ref 70–99)
HBA1C MFR BLD: 6.8 %
POTASSIUM SERPL-SCNC: 5 MMOL/L (ref 3.5–5.1)
RHEUMATOID FACTOR: <10 IU/ML
SEDIMENTATION RATE, ERYTHROCYTE: 43 MM/HR (ref 0–30)
SODIUM BLD-SCNC: 142 MMOL/L (ref 136–145)
TOTAL PROTEIN: 6.4 G/DL (ref 6.4–8.2)

## 2023-02-23 ENCOUNTER — OFFICE VISIT (OUTPATIENT)
Dept: RHEUMATOLOGY | Age: 73
End: 2023-02-23
Payer: MEDICARE

## 2023-02-23 VITALS
SYSTOLIC BLOOD PRESSURE: 120 MMHG | HEIGHT: 69 IN | WEIGHT: 251 LBS | DIASTOLIC BLOOD PRESSURE: 68 MMHG | BODY MASS INDEX: 37.18 KG/M2

## 2023-02-23 DIAGNOSIS — M15.9 GENERALIZED OSTEOARTHRITIS: Primary | ICD-10-CM

## 2023-02-23 DIAGNOSIS — Z13.89 SCREENING FOR RHEUMATIC DISORDER: ICD-10-CM

## 2023-02-23 PROCEDURE — G8417 CALC BMI ABV UP PARAM F/U: HCPCS | Performed by: INTERNAL MEDICINE

## 2023-02-23 PROCEDURE — G8400 PT W/DXA NO RESULTS DOC: HCPCS | Performed by: INTERNAL MEDICINE

## 2023-02-23 PROCEDURE — 3017F COLORECTAL CA SCREEN DOC REV: CPT | Performed by: INTERNAL MEDICINE

## 2023-02-23 PROCEDURE — 3078F DIAST BP <80 MM HG: CPT | Performed by: INTERNAL MEDICINE

## 2023-02-23 PROCEDURE — 3074F SYST BP LT 130 MM HG: CPT | Performed by: INTERNAL MEDICINE

## 2023-02-23 PROCEDURE — 99204 OFFICE O/P NEW MOD 45 MIN: CPT | Performed by: INTERNAL MEDICINE

## 2023-02-23 PROCEDURE — 1036F TOBACCO NON-USER: CPT | Performed by: INTERNAL MEDICINE

## 2023-02-23 PROCEDURE — 1090F PRES/ABSN URINE INCON ASSESS: CPT | Performed by: INTERNAL MEDICINE

## 2023-02-23 PROCEDURE — G8427 DOCREV CUR MEDS BY ELIG CLIN: HCPCS | Performed by: INTERNAL MEDICINE

## 2023-02-23 PROCEDURE — 1123F ACP DISCUSS/DSCN MKR DOCD: CPT | Performed by: INTERNAL MEDICINE

## 2023-02-23 PROCEDURE — G8484 FLU IMMUNIZE NO ADMIN: HCPCS | Performed by: INTERNAL MEDICINE

## 2023-02-23 NOTE — PROGRESS NOTES
[address], [place]                                                           [address] [place] [address] [address], [address]                                                             [phone] (P) [phone] (F)      Note is transcribed using voice recognition software. Inadvertent computerized transcription errors may be present. Patient identification: Chantale Jason, female : 1950,72 y.o.        ASSESSMENT AND PLAN:  Walter Silva was seen today for abnormal test results and joint pain. Diagnoses and all orders for this visit:    Generalized osteoarthritis    Screening for rheumatic disorder      Current clinical assessment is suggestive of generalized osteoarthritis causing intercurrent arthralgias in different joints. Reassured patient that she does not have autoimmune rheumatic conditions autoimmune arthritis. Continue following up with pain management, is on Norco as needed at this time. Recommend following up with pain management. Follow-up as needed. Patient indicates understanding and agrees with the management plan. #################################################################################################      REASON FOR CONSULTATION:  Patient is being seen at the request of  Zahida Maki MD / Salome Frankel, MD for evaluation of joint pain. HISTORY OF PRESENT ILLNESS:  67 y.o. female with history of generalized osteoarthritis bilateral knee replacement, DJD, has been getting epidural injections, followed by spine, states that whole body aches and hurts time to time, worse with physical activity, and also notices ganglion cyst in her wrist at times. Symptoms come and go. Lab work mildly elevated sed rate CRP. She has been utilizing Aspercreme.   She is unable to tolerate nonsteroid's because of gastric ulcer. Her friend recommended that she sees me, therefore made this appointment. No psoriasis, inflammatory bowel diseases, hidradenitis suppurativa or any infections. ADLs and recreational activities are manageable. All other ROS are negative. PMH, PSH,Social history , Meds reviewed. FH-no family history of autoimmune arthritis. PHYSICAL EXAM:    Vitals:    /68   Ht 5' 9\" (1.753 m)   Wt 251 lb (113.9 kg)   BMI 37.07 kg/m²   AA)x3 well nourished, and well groomed, normal judgement. MKS: Heberden's and Lenny's nodules across the MCPs, PIPs, OA changes in the CMC's, feet joints. No appreciable inflammatory joint findings in exam.  F ROM in all peripheral joints. Skin: No rashes, no induration or skin thickening or nodules. HEENT: Normal lids, lacrimal glands and pupils. No oral or nasal ulcers. Salivary glands reveal no evidence of abnormality. External inspection of the ears and nose within normal limits. Neck:  Chest: Normal effort  Heart:    Abdomen:   Lymph nodes:   Neurologic:     DATA:   RUBEN, RF, CCP negative. Mildly elevated sed rate and CRP as below  Lab Results   Component Value Date    SEDRATE 43 (H) 02/01/2023     Lab Results   Component Value Date    CRP 5.7 (H) 02/01/2023     Mildly elevated sed rate and CRP-likely from osteoarthritis. Total time 50 minutes that includes the following-  Preparing to see the patient such as reviewing patients records, pre-charting, preparing the visit on the same day, performing a medically appropriate history and physical examination, counseling and educating patient about diagnosis, management plan, ordering appropriate testings, prescriptions, communicating findings to other care providers, and documenting clinical information in electronic medical record. I thank you for giving me the opportunity to be involved in [facility] and I look forward following Bowen Leger along with you.  If you have any questions or concerns please feel free to contact me at any time.   Tyson Currie MD 02/25/23

## 2023-02-23 NOTE — PATIENT INSTRUCTIONS
OSTEOARTHRITIS:                Osteoarthritis is a joint disease that most often affects middle-age to elderly people. It is commonly referred to as OA or as \"wear and tear\" of the joints, but we now know that OA is a disease of the entire joint, involving the cartilage, joint lining, ligaments, and bone. Although it is more common in older people, it is not really accurate to say that the joints are just \"wearing out.\"  About 27 million Americans are living with OA, the most common form of joint disease. The lifetime risk of developing OA of the knee is about 46%, and the lifetime risk of developing OA of the hip is 25%, according to the [facility] Osteoarthritis Project, a long-term study from the [facility] and sponsored by the Centers for Disease Control and Prevention (often called the CDC) and the National Institutes of Health.  OA is a top cause of disability in older people. The goal of treatment in OA is to reduce pain and improve function. There is no cure for the disease, but some treatments attempt to slow disease progression.          Fast facts  OA is the most common form of joint disease, and is a leading cause of disability in elderly people.   This arthritis tends to occur in the hand joints, spine, hips, knees, and great toes.   It is characterized by breakdown of the cartilage (the tissue that cushions the ends of the bones between joints), bony changes of the joints, deterioration of tendons and ligaments, and various degrees of inflammation of the synovium (joint lining).   Though some of the joint changes are irreversible, most patients will not need joint replacement surgery.   OA symptoms (what you feel) can vary greatly among patients.   A rheumatologist can detect arthritis and prescribe the proper treatment.    In osteoarthritis, the cartilage between the bones in the joint breaks down (left image). Slowly, affected bones get bigger, as in the hand at right.  What is  osteoarthritis? OA is a frequently slowly progressive joint disease typically seen in middle-aged to elderly people. The disease occurs when the joint cartilage breaks down often because of mechanical stress or biochemical alterations, causing the bone underneath to fail. OA can occur together with other types of arthritis, such as gout or rheumatoid arthritis. OA tends to affect commonly used joints such as the hands and spine, and the weight-bearing joints such as the hips and knees. Symptoms include:   Joint pain and stiffness   Knobby swelling at the joint   Cracking or grinding noise with joint movement   Decreased function of the joint  Who gets osteoarthritis? OA affects people of all races and both sexes. Most often, it occurs in  patients age 36 and above. However, it can occur sooner if you have  other risk factors (things that raise the risk of getting OA). Risk factors include:   Older age   Having family members with OA   Obesity   Joint injury or repetitive use (overuse) of joints   Joint deformity such as unequal leg length, bowlegs or knocked knees  How is osteoarthritis diagnosed? Most often doctors detect OA based on the typical symptoms (described earlier) and on results of the physical exam. In some cases, X-rays or other imaging tests may be useful to tell the extent of disease or to help rule out other joint problems. Circles indicate joints that osteoarthritis most often affects. How is osteoarthritis treated? There is no proven treatment yet that can reverse joint damage from OA. The goal of treatment is to reduce pain and improve function of the affected joints. Most often, this is possible with a mixture of physical measures and drug therapy and, sometimes, surgery. Physical measures - Weight loss and exercise are useful in OA. Excess weight puts stress on your knee joints and hips and low back.  For every 10 pounds of weight you lose over 10 years, you can reduce the chance of developing knee OA by up to 50%. Exercise can improve your muscle strength, decrease joint pain and stiffness, and lower the chance of disability due to OA. Also helpful are support (\"assistive\") devices, such as braces or a walking cane, that help you do daily activities. Heat or cold therapy can help relieve OA symptoms for a short time. Certain alternative treatments such as spa (hot tub), massage, acupuncture and chiropractic manipulation can help relieve pain for a short time. They can be costly, though, and require repeated treatments. Also, the long-term benefits of these alternative (sometimes called complementary or integrative) medicine treatments are unproven but are under study. Drug Therapy - Forms of drug therapy include topical, oral (by mouth) and injections (shots). You apply topical drugs directly on the skin over the affected joints. These medicines include capsaicin cream, lidocaine and diclofenac gel. Oral pain relievers such as acetaminophen are common first treatments. So are nonsteroidal anti-inflammatory drugs (often called NSAIDs), which decrease swelling and pain. In 2010, the government (FDA) approved the use of duloxetine (Cymbalta) for chronic (long-term) musculoskeletal pain including from OA. This oral drug is not new. It also is in use for other health concerns, such as mood disorders, nerve pain and fibromyalgia. Patients with more serious pain may need stronger medications, such as prescription narcotics. Joint injections with corticosteroids (sometimes called cortisone shots) or with a form of lubricant called hyaluronic acid can give months of pain relief from OA. This lubricant is given in the knee, and these shots may help delay the need for a knee replacement by a few years in some patients. Surgery - Surgical treatment becomes an option for severe cases.  This includes when the joint has serious damage, or when medical treatment fails to relieve pain and you have major loss of function. Surgery may involve arthroscopy, repair of the joint done through small incisions (cuts). If the joint damage cannot be repaired, you may need a joint replacement. Supplements - Many over-the-counter nutrition supplements have been used for treatment of OA. Most lack good research data to support their effectiveness and safety. Among the most widely used are glucosamine/chondroitin sulfate, calcium and vitamin D, and omega-3 fatty acids. To ensure safety and avoid drug interactions, consult your doctor or pharmacist before using any of these supplements. This is especially true when you are combining these supplements with prescribed drugs. Living with Osteoarthritis  There is no cure for OA, but you can manage how it affects your lifestyle. Some tips include:  Properly position and support your neck and back while sitting or sleeping. Adjust furniture, such as raising a chair or toilet seat. Avoid repeated motions of the joint, especially frequent bending. Lose weight if you are overweight or obese, which can reduce pain and slow progression of OA. Exercise each day. Use arthritis support devices that will help you do daily activities. You might want to work with a physical therapist or occupational therapist to learn the best exercises and to choose arthritis assistive devices. Points to remember  OA is the most common form of arthritis and can occur together with other types of arthritis. The goal of treatment in OA is to reduce pain and improve function. Exercise is an important part of OA treatment because it can decrease joint pain and improve function. At present, there is no treatment that can reverse the damage of OA in the joints. Researchers are trying to find ways to slow or reverse this joint damage.    The rheumatologist's role in the treatment of osteoarthritis  Rheumatologists are doctors who are experts in diagnosing and treating arthritis and other diseases of the joints, muscles and bones. You may also need to see other health care providers, for instance, physical or occupational therapists and orthopedic doctors. To find a rheumatologist  For a list of rheumatologists in your area, click here. Learn more about rheumatologists and rheumatology health professionals. For more information  The [employer] of Rheumatology has compiled this list to give you a starting point for your own additional research. The ACR does not endorse or maintain these Web sites, and is not responsible for any information or claims provided on them. It is always best to talk with your rheumatologist for more information and before making any decisions about your care. Arthritis Foundation  ww.arthritis. [facility] of Arthritis and Musculoskeletal and Skin Diseases   www.niams. nih.gov  Rheumatology [address] [place] [address] advances research and training to improve the health of people with rheumatic diseases.      © 2012 American College of Rheumatology

## 2023-04-24 RX ORDER — METFORMIN HYDROCHLORIDE 500 MG/1
TABLET, EXTENDED RELEASE ORAL
Qty: 90 TABLET | Refills: 1 | Status: SHIPPED | OUTPATIENT
Start: 2023-04-24

## 2023-04-24 RX ORDER — ROPINIROLE 1 MG/1
TABLET, FILM COATED ORAL
Qty: 90 TABLET | Refills: 1 | Status: SHIPPED | OUTPATIENT
Start: 2023-04-24

## 2023-06-09 RX ORDER — LEVOTHYROXINE SODIUM 0.1 MG/1
100 TABLET ORAL DAILY
Qty: 90 TABLET | Refills: 1 | Status: SHIPPED | OUTPATIENT
Start: 2023-06-09

## 2023-06-09 RX ORDER — ALLOPURINOL 100 MG/1
100 TABLET ORAL DAILY
Qty: 90 TABLET | Refills: 1 | Status: SHIPPED | OUTPATIENT
Start: 2023-06-09

## 2023-06-09 RX ORDER — METFORMIN HYDROCHLORIDE 500 MG/1
500 TABLET, EXTENDED RELEASE ORAL DAILY
Qty: 90 TABLET | Refills: 1 | Status: SHIPPED | OUTPATIENT
Start: 2023-06-09

## 2023-06-09 RX ORDER — ATORVASTATIN CALCIUM 10 MG/1
10 TABLET, FILM COATED ORAL DAILY
Qty: 90 TABLET | Refills: 1 | Status: SHIPPED | OUTPATIENT
Start: 2023-06-09

## 2023-06-09 RX ORDER — TRIAMTERENE AND HYDROCHLOROTHIAZIDE 37.5; 25 MG/1; MG/1
TABLET ORAL
Qty: 45 TABLET | Refills: 1 | Status: SHIPPED | OUTPATIENT
Start: 2023-06-09

## 2023-06-09 RX ORDER — ROPINIROLE 1 MG/1
1 TABLET, FILM COATED ORAL DAILY
Qty: 90 TABLET | Refills: 1 | Status: SHIPPED | OUTPATIENT
Start: 2023-06-09

## 2023-06-09 NOTE — TELEPHONE ENCOUNTER
Patient needs refill:      allopurinol (ZYLOPRIM) 100 MG tablet    atorvastatin (LIPITOR) 10 MG tablet    metFORMIN (GLUCOPHAGE-XR) 500 MG extended release tablet      triamterene-hydroCHLOROthiazide (MAXZIDE-25) 37.5-25 MG per tablet    levothyroxine (SYNTHROID) 100 MCG tablet    rOPINIRole (REQUIP) 1 MG tablet      [facility] [facility] [phone] - F [phone]      Pls advise.

## 2023-06-23 ENCOUNTER — TELEPHONE (OUTPATIENT)
Dept: INTERNAL MEDICINE CLINIC | Age: 73
End: 2023-06-23

## 2023-06-23 NOTE — TELEPHONE ENCOUNTER
TRIAGE NURSE CALL:  DEEP COUGH,S. O.B  X 2 DAYS  HAS ASTHMA  ALBUTEROL INHALER NOT HELPING. PER DR BAUTISTA:    Patient should go to the ER it sounds like she is having asthma exacerbation, if you are they will be able to administer sodium medrol through IV .   PT INFORMED

## 2023-06-30 RX ORDER — ALBUTEROL SULFATE 90 UG/1
AEROSOL, METERED RESPIRATORY (INHALATION)
Qty: 6.7 EACH | Refills: 2 | Status: SHIPPED | OUTPATIENT
Start: 2023-06-30

## 2023-07-03 RX ORDER — ATORVASTATIN CALCIUM 10 MG/1
TABLET, FILM COATED ORAL
Qty: 90 TABLET | Refills: 1 | OUTPATIENT
Start: 2023-07-03

## 2023-07-20 RX ORDER — ALLOPURINOL 100 MG/1
TABLET ORAL
Qty: 90 TABLET | Refills: 1 | Status: SHIPPED | OUTPATIENT
Start: 2023-07-20

## 2023-07-20 RX ORDER — LEVOTHYROXINE SODIUM 0.1 MG/1
TABLET ORAL
Qty: 90 TABLET | Refills: 1 | Status: SHIPPED | OUTPATIENT
Start: 2023-07-20

## 2023-07-20 RX ORDER — TRIAMTERENE AND HYDROCHLOROTHIAZIDE 37.5; 25 MG/1; MG/1
TABLET ORAL
Qty: 45 TABLET | Refills: 1 | Status: SHIPPED | OUTPATIENT
Start: 2023-07-20

## 2023-07-27 ENCOUNTER — HOSPITAL ENCOUNTER (OUTPATIENT)
Age: 73
Setting detail: OUTPATIENT SURGERY
Discharge: HOME | End: 2023-07-27
Attending: PAIN MEDICINE | Admitting: PAIN MEDICINE
Payer: MEDICARE

## 2023-07-27 VITALS
TEMPERATURE: 97.8 F | DIASTOLIC BLOOD PRESSURE: 88 MMHG | RESPIRATION RATE: 18 BRPM | WEIGHT: 251 LBS | HEIGHT: 69 IN | OXYGEN SATURATION: 99 % | HEART RATE: 66 BPM | BODY MASS INDEX: 37.18 KG/M2 | SYSTOLIC BLOOD PRESSURE: 148 MMHG

## 2023-07-27 LAB
GLUCOSE BLD-MCNC: 128 MG/DL (ref 70–99)
PERFORMED ON: ABNORMAL

## 2023-07-27 PROCEDURE — 7100000011 HC PHASE II RECOVERY - ADDTL 15 MIN: Performed by: PAIN MEDICINE

## 2023-07-27 PROCEDURE — 64635 DESTROY LUMB/SAC FACET JNT: CPT | Performed by: PAIN MEDICINE

## 2023-07-27 PROCEDURE — 3600000012 HC SURGERY LEVEL 2 ADDTL 15MIN: Performed by: PAIN MEDICINE

## 2023-07-27 PROCEDURE — 99153 MOD SED SAME PHYS/QHP EA: CPT | Performed by: PAIN MEDICINE

## 2023-07-27 PROCEDURE — 3600000002 HC SURGERY LEVEL 2 BASE: Performed by: PAIN MEDICINE

## 2023-07-27 PROCEDURE — 77003 FLUOROGUIDE FOR SPINE INJECT: CPT | Performed by: PAIN MEDICINE

## 2023-07-27 PROCEDURE — 6360000002 HC RX W HCPCS: Performed by: PAIN MEDICINE

## 2023-07-27 PROCEDURE — 7100000010 HC PHASE II RECOVERY - FIRST 15 MIN: Performed by: PAIN MEDICINE

## 2023-07-27 PROCEDURE — 2709999900 HC NON-CHARGEABLE SUPPLY: Performed by: PAIN MEDICINE

## 2023-07-27 PROCEDURE — 99152 MOD SED SAME PHYS/QHP 5/>YRS: CPT | Performed by: PAIN MEDICINE

## 2023-07-27 PROCEDURE — 2500000003 HC RX 250 WO HCPCS: Performed by: PAIN MEDICINE

## 2023-07-27 PROCEDURE — 2580000003 HC RX 258: Performed by: PAIN MEDICINE

## 2023-07-27 PROCEDURE — 64636 DESTROY L/S FACET JNT ADDL: CPT | Performed by: PAIN MEDICINE

## 2023-07-27 RX ORDER — MIDAZOLAM HYDROCHLORIDE 1 MG/ML
INJECTION INTRAMUSCULAR; INTRAVENOUS
Status: DISCONTINUED
Start: 2023-07-27 | End: 2023-07-27 | Stop reason: HOSPADM

## 2023-07-27 RX ORDER — LIDOCAINE HYDROCHLORIDE 20 MG/ML
INJECTION, SOLUTION EPIDURAL; INFILTRATION; INTRACAUDAL; PERINEURAL PRN
Status: DISCONTINUED | OUTPATIENT
Start: 2023-07-27 | End: 2023-07-27 | Stop reason: ALTCHOICE

## 2023-07-27 RX ORDER — SODIUM CHLORIDE, SODIUM LACTATE, POTASSIUM CHLORIDE, CALCIUM CHLORIDE 600; 310; 30; 20 MG/100ML; MG/100ML; MG/100ML; MG/100ML
INJECTION, SOLUTION INTRAVENOUS CONTINUOUS
Status: DISCONTINUED | OUTPATIENT
Start: 2023-07-27 | End: 2023-07-27 | Stop reason: HOSPADM

## 2023-07-27 RX ORDER — LIDOCAINE HYDROCHLORIDE 10 MG/ML
INJECTION, SOLUTION EPIDURAL; INFILTRATION; INTRACAUDAL; PERINEURAL
Status: DISCONTINUED
Start: 2023-07-27 | End: 2023-07-27 | Stop reason: HOSPADM

## 2023-07-27 RX ORDER — LIDOCAINE HYDROCHLORIDE 10 MG/ML
INJECTION, SOLUTION EPIDURAL; INFILTRATION; INTRACAUDAL; PERINEURAL PRN
Status: DISCONTINUED | OUTPATIENT
Start: 2023-07-27 | End: 2023-07-27 | Stop reason: ALTCHOICE

## 2023-07-27 RX ORDER — MIDAZOLAM HYDROCHLORIDE 1 MG/ML
INJECTION INTRAMUSCULAR; INTRAVENOUS PRN
Status: DISCONTINUED | OUTPATIENT
Start: 2023-07-27 | End: 2023-07-27 | Stop reason: ALTCHOICE

## 2023-07-27 RX ADMIN — SODIUM CHLORIDE, POTASSIUM CHLORIDE, SODIUM LACTATE AND CALCIUM CHLORIDE: 600; 310; 30; 20 INJECTION, SOLUTION INTRAVENOUS at 14:55

## 2023-07-27 ASSESSMENT — PAIN DESCRIPTION - DESCRIPTORS: DESCRIPTORS: NAGGING;GNAWING;DISCOMFORT

## 2023-07-27 ASSESSMENT — PAIN - FUNCTIONAL ASSESSMENT
PAIN_FUNCTIONAL_ASSESSMENT: PREVENTS OR INTERFERES WITH MANY ACTIVE NOT PASSIVE ACTIVITIES
PAIN_FUNCTIONAL_ASSESSMENT: 0-10

## 2023-07-27 NOTE — DISCHARGE INSTRUCTIONS
[address]          [phone]          Post Radiofrequency for Dr. Praveen Chew        Pain may be worse 1-2 weeks post procedure. Take pain medicine as needed. This includes pain at needle puncture site(s), mild increased neck or back stiffness, or deep back or neck pain. Treatments for mild post procedure surgery include relative rest for 3-4 days; avoid movements which aggravate pain, and applying cold compresses. Contact Dr. Praveen Chew if you develop a fever >102, new severe unremitting lower back pain, new persistent weakness, or new bowel or bladder incontinence. You will need to follow up with Dr. Praveen Chew within 4 weeks after the procedure. No driving day of procedure. If you are seen driving during this time the proper authorities will be notified. Do not sign legal documents, make any major decisions, or be involved in work decisions for the remainder of the day. Do not stay alone for 4-6 hours after the procedure.     TO [address] [phone] TO MAKE A FOLLOW UP APPOINTMENT

## 2023-07-28 RX ORDER — ATORVASTATIN CALCIUM 10 MG/1
TABLET, FILM COATED ORAL
Qty: 90 TABLET | Refills: 1 | Status: SHIPPED | OUTPATIENT
Start: 2023-07-28

## 2023-08-14 ENCOUNTER — TELEPHONE (OUTPATIENT)
Dept: INTERNAL MEDICINE CLINIC | Age: 73
End: 2023-08-14

## 2023-08-14 NOTE — TELEPHONE ENCOUNTER
Email sent new MD, CREDENTIALING ISSUE, pt informed will call billing put claim on hold until resolved

## 2023-09-14 RX ORDER — ATORVASTATIN CALCIUM 10 MG/1
10 TABLET, FILM COATED ORAL DAILY
Qty: 90 TABLET | Refills: 1 | Status: SHIPPED | OUTPATIENT
Start: 2023-09-14

## 2023-09-18 ENCOUNTER — TELEPHONE (OUTPATIENT)
Dept: INTERNAL MEDICINE CLINIC | Age: 73
End: 2023-09-18

## 2023-09-18 DIAGNOSIS — J45.21 MILD INTERMITTENT ASTHMA WITH ACUTE EXACERBATION: Primary | ICD-10-CM

## 2023-09-18 RX ORDER — PREDNISONE 20 MG/1
20 TABLET ORAL 2 TIMES DAILY
Qty: 10 TABLET | Refills: 0 | Status: SHIPPED | OUTPATIENT
Start: 2023-09-18 | End: 2023-09-23

## 2023-09-18 RX ORDER — ALBUTEROL SULFATE 2.5 MG/3ML
2.5 SOLUTION RESPIRATORY (INHALATION) EVERY 4 HOURS PRN
Qty: 120 EACH | Refills: 3 | Status: SHIPPED | OUTPATIENT
Start: 2023-09-18

## 2023-09-18 NOTE — TELEPHONE ENCOUNTER
Sent prednisone  If she feels like she may need antibiotics would want to examine/listen to lungs  May do better with a nebulizer than the inhaler for albuterol, not sure if she has a machine

## 2023-09-18 NOTE — TELEPHONE ENCOUNTER
----- Message from Flora Rothman sent at 9/18/2023 10:55 AM EDT -----  Subject: Message to Provider    QUESTIONS  Information for Provider? Patient was told to take 2 covid test both were   negative and needs to have a medication sent to the phrarmacy to help with   her Asthama. Please call brandon   ---------------------------------------------------------------------------  --------------  Primitivo Gomez INFO  [phone]; OK to leave message on voicemail  ---------------------------------------------------------------------------  --------------  SCRIPT ANSWERS  Relationship to Patient?  Self

## 2023-09-18 NOTE — TELEPHONE ENCOUNTER
----- Message from [facility] sent at 9/18/2023 11:17 AM EDT -----  Subject: Message to Provider    QUESTIONS  Information for Provider? PT is calling to let PCP know she tested   negative for covid-19 and would need medication called in for her symptoms   of cough congestion runny head shortness of breath for over a week please   call PT back to confirm PT also question for billing as well   ---------------------------------------------------------------------------  --------------  Gin DEWITT  [phone]; OK to leave message on voicemail  ---------------------------------------------------------------------------  --------------  SCRIPT ANSWERS  Relationship to Patient?  Self

## 2023-09-18 NOTE — TELEPHONE ENCOUNTER
Spoke to pt stated she does not like the inhaler because it feels like air not mist. She said it only opens her up for a little bit and then the coughing starts again. She has had this over a week when she first started with sneezing then started rattling in chest and deep cough.

## 2023-09-18 NOTE — TELEPHONE ENCOUNTER
Pt aware Assessment  DMT2: 74y Male with newly diagnosed DM T2 with hyperglycemia, on low dose Humalog covearge, blood sugars in acceptable range,  no hypoglycemic episode, NPO for surgery today, in [facility].  CAD: s/P CABG, on medications, stable, monitored.  HTN: Controlled,  on antihypertensive medications.      Tammie Magdaleno MD  Cell: [phone]  Office: [phone]

## 2023-11-02 ENCOUNTER — APPOINTMENT (RX ONLY)
Dept: URBAN - METROPOLITAN AREA CLINIC 170 | Facility: CLINIC | Age: 73
Setting detail: DERMATOLOGY
End: 2023-11-02

## 2023-11-02 DIAGNOSIS — Z85.820 PERSONAL HISTORY OF MALIGNANT MELANOMA OF SKIN: ICD-10-CM

## 2023-11-02 DIAGNOSIS — L81.4 OTHER MELANIN HYPERPIGMENTATION: ICD-10-CM | Status: STABLE

## 2023-11-02 DIAGNOSIS — D18.0 HEMANGIOMA: ICD-10-CM | Status: STABLE

## 2023-11-02 DIAGNOSIS — D22 MELANOCYTIC NEVI: ICD-10-CM | Status: STABLE

## 2023-11-02 DIAGNOSIS — L82.1 OTHER SEBORRHEIC KERATOSIS: ICD-10-CM | Status: STABLE

## 2023-11-02 PROBLEM — D18.01 HEMANGIOMA OF SKIN AND SUBCUTANEOUS TISSUE: Status: ACTIVE | Noted: 2023-11-02

## 2023-11-02 PROBLEM — D22.5 MELANOCYTIC NEVI OF TRUNK: Status: ACTIVE | Noted: 2023-11-02

## 2023-11-02 PROCEDURE — ? TREATMENT REGIMEN

## 2023-11-02 PROCEDURE — ? COUNSELING

## 2023-11-02 PROCEDURE — 99213 OFFICE O/P EST LOW 20 MIN: CPT

## 2023-11-02 PROCEDURE — ? FULL BODY SKIN EXAM

## 2023-11-02 ASSESSMENT — LOCATION DETAILED DESCRIPTION DERM
LOCATION DETAILED: RIGHT INFERIOR MEDIAL MIDBACK
LOCATION DETAILED: EPIGASTRIC SKIN
LOCATION DETAILED: SUBXIPHOID
LOCATION DETAILED: RIGHT INFERIOR MEDIAL UPPER BACK

## 2023-11-02 ASSESSMENT — LOCATION ZONE DERM: LOCATION ZONE: TRUNK

## 2023-11-02 ASSESSMENT — LOCATION SIMPLE DESCRIPTION DERM
LOCATION SIMPLE: RIGHT UPPER BACK
LOCATION SIMPLE: RIGHT LOWER BACK
LOCATION SIMPLE: ABDOMEN

## 2023-11-02 NOTE — PROCEDURE: MIPS QUALITY
Detail Level: Detailed
Quality 226: Preventive Care And Screening: Tobacco Use: Screening And Cessation Intervention: Patient screened for tobacco use and is an ex/non-smoker
Quality 111:Pneumonia Vaccination Status For Older Adults: Patient received any pneumococcal conjugate or polysaccharide vaccine on or after their 60th birthday and before the end of the measurement period
Quality 130: Documentation Of Current Medications In The Medical Record: Current Medications Documented
Additional Notes: Bekah Estes PCP
Quality 431: Preventive Care And Screening: Unhealthy Alcohol Use - Screening: Patient not identified as an unhealthy alcohol user when screened for unhealthy alcohol use using a systematic screening method

## 2023-11-02 NOTE — HPI: EVALUATION OF SKIN LESION(S)
What Type Of Note Output Would You Prefer (Optional)?: Bullet Format
Hpi Title: Evaluation of Skin Lesions
Have Your Spot(S) Been Treated In The Past?: has not been treated
Location: Right forearm
Year Removed: 2012

## 2023-11-21 ENCOUNTER — TELEPHONE (OUTPATIENT)
Dept: INTERNAL MEDICINE CLINIC | Age: 73
End: 2023-11-21

## 2023-11-21 NOTE — TELEPHONE ENCOUNTER
----- Message from Shekhar Serrano sent at 11/21/2023 12:25 PM EST -----  Subject: Appointment Request    Reason for Call: Established Patient Appointment needed: Routine ED Follow   Up Visit    QUESTIONS    Reason for appointment request? No appointments available during search     Additional Information for Provider? patient was seen in [facility] in [place]   for breathing issues again-she needs to be seen for follow-up asap but not   appts shown.  ---------------------------------------------------------------------------  --------------  Hue Marine Martin  [phone]; OK to leave message on voicemail  ---------------------------------------------------------------------------  --------------  SCRIPT ANSWERS

## 2023-11-27 RX ORDER — METFORMIN HYDROCHLORIDE 500 MG/1
500 TABLET, EXTENDED RELEASE ORAL DAILY
Qty: 90 TABLET | Refills: 1 | Status: SHIPPED | OUTPATIENT
Start: 2023-11-27

## 2023-11-27 RX ORDER — ALLOPURINOL 100 MG/1
100 TABLET ORAL DAILY
Qty: 90 TABLET | Refills: 1 | Status: SHIPPED | OUTPATIENT
Start: 2023-11-27

## 2023-11-27 RX ORDER — TRIAMTERENE AND HYDROCHLOROTHIAZIDE 37.5; 25 MG/1; MG/1
TABLET ORAL
Qty: 45 TABLET | Refills: 1 | Status: SHIPPED | OUTPATIENT
Start: 2023-11-27

## 2023-11-27 RX ORDER — LEVOTHYROXINE SODIUM 0.1 MG/1
100 TABLET ORAL DAILY
Qty: 90 TABLET | Refills: 1 | Status: SHIPPED | OUTPATIENT
Start: 2023-11-27

## 2023-11-27 RX ORDER — ROPINIROLE 1 MG/1
1 TABLET, FILM COATED ORAL DAILY
Qty: 90 TABLET | Refills: 1 | Status: SHIPPED | OUTPATIENT
Start: 2023-11-27

## 2023-11-28 ENCOUNTER — OFFICE VISIT (OUTPATIENT)
Dept: INTERNAL MEDICINE CLINIC | Age: 73
End: 2023-11-28
Payer: MEDICARE

## 2023-11-28 VITALS
OXYGEN SATURATION: 100 % | WEIGHT: 254 LBS | HEART RATE: 71 BPM | DIASTOLIC BLOOD PRESSURE: 70 MMHG | BODY MASS INDEX: 37.62 KG/M2 | SYSTOLIC BLOOD PRESSURE: 128 MMHG | HEIGHT: 69 IN

## 2023-11-28 DIAGNOSIS — E11.9 TYPE 2 DIABETES MELLITUS WITHOUT COMPLICATION, WITHOUT LONG-TERM CURRENT USE OF INSULIN (HCC): ICD-10-CM

## 2023-11-28 DIAGNOSIS — J45.40 MODERATE PERSISTENT ASTHMA WITHOUT COMPLICATION: Primary | ICD-10-CM

## 2023-11-28 PROCEDURE — G8400 PT W/DXA NO RESULTS DOC: HCPCS | Performed by: INTERNAL MEDICINE

## 2023-11-28 PROCEDURE — 3044F HG A1C LEVEL LT 7.0%: CPT | Performed by: INTERNAL MEDICINE

## 2023-11-28 PROCEDURE — 2022F DILAT RTA XM EVC RTNOPTHY: CPT | Performed by: INTERNAL MEDICINE

## 2023-11-28 PROCEDURE — G8427 DOCREV CUR MEDS BY ELIG CLIN: HCPCS | Performed by: INTERNAL MEDICINE

## 2023-11-28 PROCEDURE — 1036F TOBACCO NON-USER: CPT | Performed by: INTERNAL MEDICINE

## 2023-11-28 PROCEDURE — 1090F PRES/ABSN URINE INCON ASSESS: CPT | Performed by: INTERNAL MEDICINE

## 2023-11-28 PROCEDURE — 3074F SYST BP LT 130 MM HG: CPT | Performed by: INTERNAL MEDICINE

## 2023-11-28 PROCEDURE — 1123F ACP DISCUSS/DSCN MKR DOCD: CPT | Performed by: INTERNAL MEDICINE

## 2023-11-28 PROCEDURE — 3017F COLORECTAL CA SCREEN DOC REV: CPT | Performed by: INTERNAL MEDICINE

## 2023-11-28 PROCEDURE — 3078F DIAST BP <80 MM HG: CPT | Performed by: INTERNAL MEDICINE

## 2023-11-28 PROCEDURE — 99214 OFFICE O/P EST MOD 30 MIN: CPT | Performed by: INTERNAL MEDICINE

## 2023-11-28 PROCEDURE — G8417 CALC BMI ABV UP PARAM F/U: HCPCS | Performed by: INTERNAL MEDICINE

## 2023-11-28 PROCEDURE — G8484 FLU IMMUNIZE NO ADMIN: HCPCS | Performed by: INTERNAL MEDICINE

## 2023-11-28 RX ORDER — BUDESONIDE AND FORMOTEROL FUMARATE DIHYDRATE 160; 4.5 UG/1; UG/1
2 AEROSOL RESPIRATORY (INHALATION) 2 TIMES DAILY
Qty: 10.2 G | Refills: 3 | Status: SHIPPED | OUTPATIENT
Start: 2023-11-28

## 2023-11-28 NOTE — PROGRESS NOTES
Chelsey Turner (:  1950) is a 68 y.o. female,Established patient, here for evaluation of the following chief complaint(s):  Cough (Was in [place] and went to urgent care because in mornings and night constant cough and SOB, doing the nebulizer 2 times a day not on anything else. )         ASSESSMENT/PLAN:  1. Moderate persistent asthma without complication   - frequent symptoms, needs controller medications. Start Symbicort 160-4.5 2 puffs twice daily  2. Type 2 diabetes mellitus without complication, without long-term current use of insulin ([address])  - labs ordered for upcoming visit  -     Comprehensive Metabolic Panel; Future  -     Lipid Panel; Future  -     CBC with Auto Differential; Future  -     Hemoglobin A1C; Future      Return for AWV. Subjective   SUBJECTIVE/OBJECTIVE:  HPI    Had been feeling better with nebulizer and prednisone, started back in September. Felt good enough to go to [place], then it started coming back again. Went to urgent care in [facility], which diagnosed her with asthma exacerbation. Gave her an antibiotic and prednisone. Once off the treatment symptoms start to return, will fluctuate day to day. Occ nighttime awakenings  On bad days uses albuterol at least 4x/day    Review of Systems       Objective   Physical Exam  Vitals reviewed. Constitutional:       General: She is not in acute distress. Appearance: Normal appearance. She is well-developed. HENT:      Head: Normocephalic and atraumatic. Cardiovascular:      Rate and Rhythm: Normal rate and regular rhythm. Heart sounds: Normal heart sounds. Pulmonary:      Effort: Pulmonary effort is normal. No respiratory distress. Breath sounds: Normal breath sounds. Skin:     General: Skin is warm and dry. Neurological:      Mental Status: She is alert. Psychiatric:         Behavior: Behavior normal.         Thought Content:  Thought content normal.         Judgment: Judgment normal.

## 2024-01-22 ENCOUNTER — OFFICE VISIT (OUTPATIENT)
Dept: INTERNAL MEDICINE CLINIC | Age: 74
End: 2024-01-22
Payer: MEDICARE

## 2024-01-22 VITALS
HEIGHT: 69 IN | BODY MASS INDEX: 37.47 KG/M2 | WEIGHT: 253 LBS | SYSTOLIC BLOOD PRESSURE: 130 MMHG | DIASTOLIC BLOOD PRESSURE: 70 MMHG

## 2024-01-22 DIAGNOSIS — Z00.00 MEDICARE ANNUAL WELLNESS VISIT, SUBSEQUENT: Primary | ICD-10-CM

## 2024-01-22 DIAGNOSIS — E11.9 TYPE 2 DIABETES MELLITUS WITHOUT COMPLICATION, WITHOUT LONG-TERM CURRENT USE OF INSULIN (HCC): ICD-10-CM

## 2024-01-22 DIAGNOSIS — E61.1 IRON DEFICIENCY: ICD-10-CM

## 2024-01-22 DIAGNOSIS — R53.83 FATIGUE, UNSPECIFIED TYPE: ICD-10-CM

## 2024-01-22 DIAGNOSIS — I10 PRIMARY HYPERTENSION: ICD-10-CM

## 2024-01-22 DIAGNOSIS — E03.9 HYPOTHYROIDISM, UNSPECIFIED TYPE: ICD-10-CM

## 2024-01-22 DIAGNOSIS — Z23 NEED FOR INFLUENZA VACCINATION: ICD-10-CM

## 2024-01-22 DIAGNOSIS — G25.81 RESTLESS LEG SYNDROME: ICD-10-CM

## 2024-01-22 DIAGNOSIS — F32.4 MAJOR DEPRESSIVE DISORDER, SINGLE EPISODE, IN PARTIAL REMISSION (HCC): ICD-10-CM

## 2024-01-22 DIAGNOSIS — M46.1 SACROILIITIS, NOT ELSEWHERE CLASSIFIED (HCC): ICD-10-CM

## 2024-01-22 LAB
BASOPHILS # BLD: 0.1 K/UL (ref 0–0.2)
BASOPHILS NFR BLD: 0.7 %
DEPRECATED RDW RBC AUTO: 13.4 % (ref 12.4–15.4)
EOSINOPHIL # BLD: 0.3 K/UL (ref 0–0.6)
EOSINOPHIL NFR BLD: 2.7 %
HCT VFR BLD AUTO: 48.1 % (ref 36–48)
HGB BLD-MCNC: 15.8 G/DL (ref 12–16)
LYMPHOCYTES # BLD: 1.7 K/UL (ref 1–5.1)
LYMPHOCYTES NFR BLD: 17.5 %
MCH RBC QN AUTO: 31.9 PG (ref 26–34)
MCHC RBC AUTO-ENTMCNC: 32.8 G/DL (ref 31–36)
MCV RBC AUTO: 97.2 FL (ref 80–100)
MONOCYTES # BLD: 0.7 K/UL (ref 0–1.3)
MONOCYTES NFR BLD: 6.9 %
NEUTROPHILS # BLD: 7.1 K/UL (ref 1.7–7.7)
NEUTROPHILS NFR BLD: 72.2 %
PLATELET # BLD AUTO: 300 K/UL (ref 135–450)
PMV BLD AUTO: 9.5 FL (ref 5–10.5)
RBC # BLD AUTO: 4.95 M/UL (ref 4–5.2)
WBC # BLD AUTO: 9.9 K/UL (ref 4–11)

## 2024-01-22 PROCEDURE — 3078F DIAST BP <80 MM HG: CPT | Performed by: INTERNAL MEDICINE

## 2024-01-22 PROCEDURE — G0439 PPPS, SUBSEQ VISIT: HCPCS | Performed by: INTERNAL MEDICINE

## 2024-01-22 PROCEDURE — 90694 VACC AIIV4 NO PRSRV 0.5ML IM: CPT | Performed by: INTERNAL MEDICINE

## 2024-01-22 PROCEDURE — 3075F SYST BP GE 130 - 139MM HG: CPT | Performed by: INTERNAL MEDICINE

## 2024-01-22 PROCEDURE — G0008 ADMIN INFLUENZA VIRUS VAC: HCPCS | Performed by: INTERNAL MEDICINE

## 2024-01-22 PROCEDURE — 3046F HEMOGLOBIN A1C LEVEL >9.0%: CPT | Performed by: INTERNAL MEDICINE

## 2024-01-22 PROCEDURE — 1123F ACP DISCUSS/DSCN MKR DOCD: CPT | Performed by: INTERNAL MEDICINE

## 2024-01-22 PROCEDURE — G8484 FLU IMMUNIZE NO ADMIN: HCPCS | Performed by: INTERNAL MEDICINE

## 2024-01-22 PROCEDURE — 3017F COLORECTAL CA SCREEN DOC REV: CPT | Performed by: INTERNAL MEDICINE

## 2024-01-22 ASSESSMENT — PATIENT HEALTH QUESTIONNAIRE - PHQ9
SUM OF ALL RESPONSES TO PHQ QUESTIONS 1-9: 6
SUM OF ALL RESPONSES TO PHQ9 QUESTIONS 1 & 2: 3
2. FEELING DOWN, DEPRESSED OR HOPELESS: 1
SUM OF ALL RESPONSES TO PHQ QUESTIONS 1-9: 6
SUM OF ALL RESPONSES TO PHQ QUESTIONS 1-9: 6
8. MOVING OR SPEAKING SO SLOWLY THAT OTHER PEOPLE COULD HAVE NOTICED. OR THE OPPOSITE, BEING SO FIGETY OR RESTLESS THAT YOU HAVE BEEN MOVING AROUND A LOT MORE THAN USUAL: 0
9. THOUGHTS THAT YOU WOULD BE BETTER OFF DEAD, OR OF HURTING YOURSELF: 0
SUM OF ALL RESPONSES TO PHQ QUESTIONS 1-9: 6
10. IF YOU CHECKED OFF ANY PROBLEMS, HOW DIFFICULT HAVE THESE PROBLEMS MADE IT FOR YOU TO DO YOUR WORK, TAKE CARE OF THINGS AT HOME, OR GET ALONG WITH OTHER PEOPLE: 1
6. FEELING BAD ABOUT YOURSELF - OR THAT YOU ARE A FAILURE OR HAVE LET YOURSELF OR YOUR FAMILY DOWN: 0
5. POOR APPETITE OR OVEREATING: 1
1. LITTLE INTEREST OR PLEASURE IN DOING THINGS: 2
3. TROUBLE FALLING OR STAYING ASLEEP: 1
4. FEELING TIRED OR HAVING LITTLE ENERGY: 1
7. TROUBLE CONCENTRATING ON THINGS, SUCH AS READING THE NEWSPAPER OR WATCHING TELEVISION: 0

## 2024-01-22 ASSESSMENT — LIFESTYLE VARIABLES
HOW OFTEN DO YOU HAVE A DRINK CONTAINING ALCOHOL: NEVER
HOW MANY STANDARD DRINKS CONTAINING ALCOHOL DO YOU HAVE ON A TYPICAL DAY: PATIENT DOES NOT DRINK

## 2024-01-22 NOTE — PROGRESS NOTES
cream Apply topically daily.  Patient taking differently: daily as needed Yes Ward Gaona MD       CareTeam (Including outside providers/suppliers regularly involved in providing care):   Patient Care Team:  Shanti Bolanos MD as PCP - General (Internal Medicine)  Shanti Bolanos MD as PCP - Empaneled Provider     Reviewed and updated this visit:  Tobacco  Allergies  Meds  Med Hx  Surg Hx  Soc Hx  Fam Hx

## 2024-01-23 LAB
ALBUMIN SERPL-MCNC: 4.3 G/DL (ref 3.4–5)
ALBUMIN/GLOB SERPL: 2.2 {RATIO} (ref 1.1–2.2)
ALP SERPL-CCNC: 95 U/L (ref 40–129)
ALT SERPL-CCNC: 13 U/L (ref 10–40)
ANION GAP SERPL CALCULATED.3IONS-SCNC: 13 MMOL/L (ref 3–16)
AST SERPL-CCNC: 15 U/L (ref 15–37)
BILIRUB SERPL-MCNC: 0.5 MG/DL (ref 0–1)
BUN SERPL-MCNC: 21 MG/DL (ref 7–20)
CALCIUM SERPL-MCNC: 9.9 MG/DL (ref 8.3–10.6)
CO2 SERPL-SCNC: 27 MMOL/L (ref 21–32)
CREAT SERPL-MCNC: 1 MG/DL (ref 0.6–1.2)
CREAT UR-MCNC: 205 MG/DL (ref 28–259)
FERRITIN SERPL IA-MCNC: 155 NG/ML (ref 15–150)
GFR SERPLBLD CREATININE-BSD FMLA CKD-EPI: 59 ML/MIN/{1.73_M2}
GLUCOSE SERPL-MCNC: 142 MG/DL (ref 70–99)
IRON SATN MFR SERPL: 28 % (ref 15–50)
IRON SERPL-MCNC: 88 UG/DL (ref 37–145)
MICROALBUMIN UR DL<=1MG/L-MCNC: 1.3 MG/DL
MICROALBUMIN/CREAT UR: 6.3 MG/G (ref 0–30)
POTASSIUM SERPL-SCNC: 4.6 MMOL/L (ref 3.5–5.1)
PROT SERPL-MCNC: 6.3 G/DL (ref 6.4–8.2)
SODIUM SERPL-SCNC: 139 MMOL/L (ref 136–145)
TIBC SERPL-MCNC: 313 UG/DL (ref 260–445)
TSH SERPL DL<=0.005 MIU/L-ACNC: 3.57 UIU/ML (ref 0.27–4.2)
VIT B12 SERPL-MCNC: 407 PG/ML (ref 211–911)

## 2024-01-26 ENCOUNTER — TELEPHONE (OUTPATIENT)
Dept: INTERNAL MEDICINE CLINIC | Age: 74
End: 2024-01-26

## 2024-01-26 RX ORDER — SEMAGLUTIDE 0.68 MG/ML
INJECTION, SOLUTION SUBCUTANEOUS
Qty: 3 ML | Refills: 2 | Status: SHIPPED | OUTPATIENT
Start: 2024-01-26

## 2024-01-26 NOTE — TELEPHONE ENCOUNTER
Pt says a [facility]     Pharm [id number] - [place], [place] - [address] - P [phone] - F [phone]      Please call and advise

## 2024-04-01 RX ORDER — BUDESONIDE AND FORMOTEROL FUMARATE DIHYDRATE 160; 4.5 UG/1; UG/1
2 AEROSOL RESPIRATORY (INHALATION) 2 TIMES DAILY
Qty: 30.6 EACH | Refills: 2 | Status: SHIPPED | OUTPATIENT
Start: 2024-04-01

## 2024-04-04 PROBLEM — F11.20 OPIOID DEPENDENCE WITH CURRENT USE (HCC): Status: ACTIVE | Noted: 2024-04-04

## 2024-04-15 RX ORDER — SEMAGLUTIDE 0.68 MG/ML
INJECTION, SOLUTION SUBCUTANEOUS
Qty: 3 ML | Refills: 2 | Status: SHIPPED | OUTPATIENT
Start: 2024-04-15

## 2024-04-22 RX ORDER — ROPINIROLE 1 MG/1
1 TABLET, FILM COATED ORAL DAILY
Qty: 90 TABLET | Refills: 1 | Status: SHIPPED | OUTPATIENT
Start: 2024-04-22

## 2024-04-22 RX ORDER — LEVOTHYROXINE SODIUM 0.1 MG/1
100 TABLET ORAL DAILY
Qty: 90 TABLET | Refills: 1 | Status: SHIPPED | OUTPATIENT
Start: 2024-04-22

## 2024-04-22 RX ORDER — TRIAMTERENE AND HYDROCHLOROTHIAZIDE 37.5; 25 MG/1; MG/1
TABLET ORAL
Qty: 45 TABLET | Refills: 1 | Status: SHIPPED | OUTPATIENT
Start: 2024-04-22

## 2024-04-22 RX ORDER — ALLOPURINOL 100 MG/1
100 TABLET ORAL DAILY
Qty: 90 TABLET | Refills: 1 | Status: SHIPPED | OUTPATIENT
Start: 2024-04-22

## 2024-04-22 RX ORDER — METFORMIN HYDROCHLORIDE 500 MG/1
500 TABLET, EXTENDED RELEASE ORAL DAILY
Qty: 90 TABLET | Refills: 1 | Status: SHIPPED | OUTPATIENT
Start: 2024-04-22

## 2024-04-25 ENCOUNTER — APPOINTMENT (RX ONLY)
Dept: URBAN - METROPOLITAN AREA CLINIC 170 | Facility: CLINIC | Age: 74
Setting detail: DERMATOLOGY
End: 2024-04-25

## 2024-04-25 DIAGNOSIS — R23.3 SPONTANEOUS ECCHYMOSES: ICD-10-CM | Status: STABLE

## 2024-04-25 PROCEDURE — 99212 OFFICE O/P EST SF 10 MIN: CPT

## 2024-04-25 PROCEDURE — ? COUNSELING

## 2024-04-25 PROCEDURE — ? PHOTO-DOCUMENTATION

## 2024-04-25 ASSESSMENT — LOCATION SIMPLE DESCRIPTION DERM: LOCATION SIMPLE: LEFT HAND

## 2024-04-25 ASSESSMENT — LOCATION ZONE DERM: LOCATION ZONE: HAND

## 2024-04-25 ASSESSMENT — LOCATION DETAILED DESCRIPTION DERM: LOCATION DETAILED: LEFT RADIAL DORSAL HAND

## 2024-05-21 RX ORDER — ALBUTEROL SULFATE 90 UG/1
AEROSOL, METERED RESPIRATORY (INHALATION)
Qty: 6.7 EACH | Refills: 2 | Status: SHIPPED | OUTPATIENT
Start: 2024-05-21

## 2024-06-04 RX ORDER — ATORVASTATIN CALCIUM 10 MG/1
10 TABLET, FILM COATED ORAL DAILY
Qty: 90 TABLET | Refills: 1 | Status: SHIPPED | OUTPATIENT
Start: 2024-06-04

## 2024-06-17 ENCOUNTER — OFFICE VISIT (OUTPATIENT)
Dept: INTERNAL MEDICINE CLINIC | Age: 74
End: 2024-06-17
Payer: MEDICARE

## 2024-06-17 VITALS
HEIGHT: 67 IN | HEART RATE: 78 BPM | DIASTOLIC BLOOD PRESSURE: 70 MMHG | OXYGEN SATURATION: 96 % | BODY MASS INDEX: 34.53 KG/M2 | WEIGHT: 220 LBS | SYSTOLIC BLOOD PRESSURE: 120 MMHG | TEMPERATURE: 97.3 F

## 2024-06-17 DIAGNOSIS — Z78.0 POST-MENOPAUSAL: Primary | ICD-10-CM

## 2024-06-17 DIAGNOSIS — E11.9 TYPE 2 DIABETES MELLITUS WITHOUT COMPLICATION, WITHOUT LONG-TERM CURRENT USE OF INSULIN (HCC): ICD-10-CM

## 2024-06-17 DIAGNOSIS — I10 PRIMARY HYPERTENSION: ICD-10-CM

## 2024-06-17 LAB — HBA1C MFR BLD: 5.7 %

## 2024-06-17 PROCEDURE — 99214 OFFICE O/P EST MOD 30 MIN: CPT | Performed by: STUDENT IN AN ORGANIZED HEALTH CARE EDUCATION/TRAINING PROGRAM

## 2024-06-17 PROCEDURE — 1123F ACP DISCUSS/DSCN MKR DOCD: CPT | Performed by: STUDENT IN AN ORGANIZED HEALTH CARE EDUCATION/TRAINING PROGRAM

## 2024-06-17 PROCEDURE — 83036 HEMOGLOBIN GLYCOSYLATED A1C: CPT | Performed by: STUDENT IN AN ORGANIZED HEALTH CARE EDUCATION/TRAINING PROGRAM

## 2024-06-17 PROCEDURE — 3044F HG A1C LEVEL LT 7.0%: CPT | Performed by: STUDENT IN AN ORGANIZED HEALTH CARE EDUCATION/TRAINING PROGRAM

## 2024-06-17 PROCEDURE — 3074F SYST BP LT 130 MM HG: CPT | Performed by: STUDENT IN AN ORGANIZED HEALTH CARE EDUCATION/TRAINING PROGRAM

## 2024-06-17 PROCEDURE — 3078F DIAST BP <80 MM HG: CPT | Performed by: STUDENT IN AN ORGANIZED HEALTH CARE EDUCATION/TRAINING PROGRAM

## 2024-06-17 SDOH — ECONOMIC STABILITY: FOOD INSECURITY: WITHIN THE PAST 12 MONTHS, THE FOOD YOU BOUGHT JUST DIDN'T LAST AND YOU DIDN'T HAVE MONEY TO GET MORE.: NEVER TRUE

## 2024-06-17 SDOH — ECONOMIC STABILITY: FOOD INSECURITY: WITHIN THE PAST 12 MONTHS, YOU WORRIED THAT YOUR FOOD WOULD RUN OUT BEFORE YOU GOT MONEY TO BUY MORE.: NEVER TRUE

## 2024-06-17 SDOH — ECONOMIC STABILITY: INCOME INSECURITY: HOW HARD IS IT FOR YOU TO PAY FOR THE VERY BASICS LIKE FOOD, HOUSING, MEDICAL CARE, AND HEATING?: NOT HARD AT ALL

## 2024-06-17 ASSESSMENT — ENCOUNTER SYMPTOMS
ABDOMINAL PAIN: 0
SHORTNESS OF BREATH: 0
CONSTIPATION: 0
SINUS PAIN: 0
CHEST TIGHTNESS: 0
VOMITING: 0
DIARRHEA: 0
EYE PAIN: 0

## 2024-06-17 NOTE — ASSESSMENT & PLAN NOTE
This problem is well-controlled on current regimen.  - Continue triamterene hydrochlorothiazide  - Continue weight loss

## 2024-06-17 NOTE — PROGRESS NOTES
2024    Britney Del Valle (:  1950) is a 73 y.o. female, here for evaluation of the following medical concerns:    Chief Complaint   Patient presents with    Establish Care     Previous pt Dr. Bolanos        HPI    Patient is here to establish care. Prior patient of Dr. Bolanos.     Patient is getting MRI Thursday of her low back. She is seeing Dr Ramirez (pain management).    She feels well overall.  Denies any specific complaints.  Has lost 33 pounds on Ozempic in the last 4 months.      Review of Systems   Constitutional:  Negative for fatigue and unexpected weight change.   HENT:  Negative for hearing loss and sinus pain.    Eyes:  Negative for pain.   Respiratory:  Negative for chest tightness and shortness of breath.    Cardiovascular:  Negative for chest pain and palpitations.   Gastrointestinal:  Negative for abdominal pain, constipation, diarrhea and vomiting.   Genitourinary:  Negative for difficulty urinating and vaginal bleeding.   Musculoskeletal:  Negative for myalgias.   Neurological:  Negative for dizziness and weakness.   Psychiatric/Behavioral:  Negative for dysphoric mood.        Prior to Visit Medications    Medication Sig Taking? Authorizing Provider   atorvastatin (LIPITOR) 10 MG tablet TAKE 1 TABLET EVERY DAY Yes Shanti Bolanos MD   albuterol sulfate HFA (PROVENTIL;VENTOLIN;PROAIR) 108 (90 Base) MCG/ACT inhaler INHALE 2 PUFFS BY MOUTH EVERY 4 HOURS AS NEEDED FOR WHEEZE OR FOR SHORTNESS OF BREATH Yes Shanti Bolanos MD   HYDROcodone-acetaminophen (NORCO) 5-325 MG per tablet Take 1 tablet by mouth 3 times daily as needed for Pain for up to 30 days. Max Daily Amount: 3 tablets Yes Isaiah Ramirez MD   metFORMIN (GLUCOPHAGE-XR) 500 MG extended release tablet TAKE 1 TABLET EVERY DAY Yes Shanti Bolanos MD   allopurinol (ZYLOPRIM) 100 MG tablet TAKE 1 TABLET EVERY DAY Yes Shanti Bolanos MD   rOPINIRole (REQUIP) 1 MG tablet TAKE 1 TABLET EVERY DAY Yes Shanti Bolanos MD

## 2024-06-17 NOTE — ASSESSMENT & PLAN NOTE
This problem is well-controlled on current regimen.  A1c 5.7% today.  - Increase Ozempic from 0.5 to 1mg weekly  -Continue metformin  - Discussed healthy diet and exercise

## 2024-06-20 ENCOUNTER — HOSPITAL ENCOUNTER (OUTPATIENT)
Dept: MRI IMAGING | Age: 74
Discharge: HOME | End: 2024-06-20
Attending: ANESTHESIOLOGY
Payer: MEDICARE

## 2024-06-20 ENCOUNTER — HOSPITAL ENCOUNTER (OUTPATIENT)
Dept: GENERAL RADIOLOGY | Age: 74
Discharge: HOME | End: 2024-06-20
Attending: ANESTHESIOLOGY
Payer: MEDICARE

## 2024-06-20 DIAGNOSIS — Z95.0 PACEMAKER: ICD-10-CM

## 2024-06-20 DIAGNOSIS — M54.16 LUMBAR RADICULOPATHY: ICD-10-CM

## 2024-06-20 PROCEDURE — 72148 MRI LUMBAR SPINE W/O DYE: CPT

## 2024-06-20 PROCEDURE — 71045 X-RAY EXAM CHEST 1 VIEW: CPT

## 2024-06-21 PROBLEM — M54.16 LUMBAR RADICULOPATHY: Status: ACTIVE | Noted: 2024-06-21

## 2024-06-21 PROBLEM — M54.51 VERTEBROGENIC LOW BACK PAIN: Status: ACTIVE | Noted: 2024-06-21

## 2024-07-09 ENCOUNTER — HOSPITAL ENCOUNTER (OUTPATIENT)
Dept: GENERAL RADIOLOGY | Age: 74
Discharge: HOME | End: 2024-07-09
Attending: STUDENT IN AN ORGANIZED HEALTH CARE EDUCATION/TRAINING PROGRAM
Payer: MEDICARE

## 2024-07-09 ENCOUNTER — TELEPHONE (OUTPATIENT)
Dept: INTERNAL MEDICINE CLINIC | Age: 74
End: 2024-07-09

## 2024-07-09 DIAGNOSIS — E11.9 TYPE 2 DIABETES MELLITUS WITHOUT COMPLICATION, WITHOUT LONG-TERM CURRENT USE OF INSULIN (HCC): Primary | ICD-10-CM

## 2024-07-09 DIAGNOSIS — Z78.0 POST-MENOPAUSAL: ICD-10-CM

## 2024-07-09 PROCEDURE — 77080 DXA BONE DENSITY AXIAL: CPT

## 2024-07-09 RX ORDER — SEMAGLUTIDE 1.34 MG/ML
1 INJECTION, SOLUTION SUBCUTANEOUS
Qty: 3 ML | Refills: 5 | Status: SHIPPED | OUTPATIENT
Start: 2024-07-09

## 2024-07-09 NOTE — TELEPHONE ENCOUNTER
Pt is requesting an increase in the dosage of the medication listed below:     Semaglutide,0.25 or 0.5MG/DOS, (OZEMPIC, 0.25 OR 0.5 MG/DOSE,) 2 MG/3ML SOPN     She would like to increase to the 7.0     Please send to:     [facility]/pharmacy #[id number] - [place], [place] - [address] - P [phone] - F [phone]     Please advise pt at:   : [phone]

## 2024-09-16 ENCOUNTER — OFFICE VISIT (OUTPATIENT)
Dept: INTERNAL MEDICINE CLINIC | Age: 74
End: 2024-09-16

## 2024-09-16 VITALS
BODY MASS INDEX: 32.11 KG/M2 | OXYGEN SATURATION: 98 % | WEIGHT: 205 LBS | DIASTOLIC BLOOD PRESSURE: 72 MMHG | SYSTOLIC BLOOD PRESSURE: 110 MMHG | HEART RATE: 88 BPM

## 2024-09-16 DIAGNOSIS — F11.20 OPIOID DEPENDENCE WITH CURRENT USE (HCC): ICD-10-CM

## 2024-09-16 DIAGNOSIS — E11.9 TYPE 2 DIABETES MELLITUS WITHOUT COMPLICATION, WITHOUT LONG-TERM CURRENT USE OF INSULIN (HCC): Primary | ICD-10-CM

## 2024-09-16 DIAGNOSIS — Z12.31 ENCOUNTER FOR SCREENING MAMMOGRAM FOR MALIGNANT NEOPLASM OF BREAST: ICD-10-CM

## 2024-09-16 DIAGNOSIS — Z00.00 MEDICARE ANNUAL WELLNESS VISIT, SUBSEQUENT: ICD-10-CM

## 2024-09-16 DIAGNOSIS — L30.4 INTERTRIGO: ICD-10-CM

## 2024-09-16 DIAGNOSIS — I49.5 SSS (SICK SINUS SYNDROME) (HCC): ICD-10-CM

## 2024-09-16 PROBLEM — M51.26 DISC DISPLACEMENT, LUMBAR: Chronic | Status: RESOLVED | Noted: 2019-01-28 | Resolved: 2024-09-16

## 2024-09-16 PROBLEM — M51.26 DISPLACEMENT OF LUMBAR INTERVERTEBRAL DISC WITHOUT MYELOPATHY: Status: RESOLVED | Noted: 2018-10-29 | Resolved: 2024-09-16

## 2024-09-16 PROBLEM — M47.817 LUMBOSACRAL SPONDYLOSIS WITHOUT MYELOPATHY: Status: RESOLVED | Noted: 2018-10-29 | Resolved: 2024-09-16

## 2024-09-16 PROBLEM — M51.369 LUMBAR DEGENERATIVE DISC DISEASE: Chronic | Status: RESOLVED | Noted: 2019-01-28 | Resolved: 2024-09-16

## 2024-09-16 PROBLEM — M54.51 VERTEBROGENIC LOW BACK PAIN: Status: RESOLVED | Noted: 2024-06-21 | Resolved: 2024-09-16

## 2024-09-16 PROBLEM — M51.36 LUMBAR DEGENERATIVE DISC DISEASE: Chronic | Status: RESOLVED | Noted: 2019-01-28 | Resolved: 2024-09-16

## 2024-09-16 RX ORDER — SEMAGLUTIDE 2.68 MG/ML
2 INJECTION, SOLUTION SUBCUTANEOUS
Qty: 3 ML | Refills: 0 | Status: SHIPPED | OUTPATIENT
Start: 2024-09-16 | End: 2024-10-14

## 2024-09-16 ASSESSMENT — PATIENT HEALTH QUESTIONNAIRE - PHQ9
7. TROUBLE CONCENTRATING ON THINGS, SUCH AS READING THE NEWSPAPER OR WATCHING TELEVISION: NOT AT ALL
3. TROUBLE FALLING OR STAYING ASLEEP: NOT AT ALL
9. THOUGHTS THAT YOU WOULD BE BETTER OFF DEAD, OR OF HURTING YOURSELF: NOT AT ALL
6. FEELING BAD ABOUT YOURSELF - OR THAT YOU ARE A FAILURE OR HAVE LET YOURSELF OR YOUR FAMILY DOWN: NOT AT ALL
SUM OF ALL RESPONSES TO PHQ QUESTIONS 1-9: 3
5. POOR APPETITE OR OVEREATING: NOT AT ALL
8. MOVING OR SPEAKING SO SLOWLY THAT OTHER PEOPLE COULD HAVE NOTICED. OR THE OPPOSITE, BEING SO FIGETY OR RESTLESS THAT YOU HAVE BEEN MOVING AROUND A LOT MORE THAN USUAL: NOT AT ALL
4. FEELING TIRED OR HAVING LITTLE ENERGY: NOT AT ALL
10. IF YOU CHECKED OFF ANY PROBLEMS, HOW DIFFICULT HAVE THESE PROBLEMS MADE IT FOR YOU TO DO YOUR WORK, TAKE CARE OF THINGS AT HOME, OR GET ALONG WITH OTHER PEOPLE: NOT DIFFICULT AT ALL
SUM OF ALL RESPONSES TO PHQ QUESTIONS 1-9: 3
2. FEELING DOWN, DEPRESSED OR HOPELESS: NOT AT ALL
1. LITTLE INTEREST OR PLEASURE IN DOING THINGS: NEARLY EVERY DAY
SUM OF ALL RESPONSES TO PHQ QUESTIONS 1-9: 3
SUM OF ALL RESPONSES TO PHQ9 QUESTIONS 1 & 2: 3
SUM OF ALL RESPONSES TO PHQ QUESTIONS 1-9: 3

## 2024-09-30 RX ORDER — METFORMIN HCL 500 MG
500 TABLET, EXTENDED RELEASE 24 HR ORAL DAILY
Qty: 90 TABLET | Refills: 1 | Status: SHIPPED | OUTPATIENT
Start: 2024-09-30

## 2024-09-30 RX ORDER — ALLOPURINOL 100 MG/1
100 TABLET ORAL DAILY
Qty: 90 TABLET | Refills: 1 | Status: SHIPPED | OUTPATIENT
Start: 2024-09-30

## 2024-09-30 RX ORDER — LEVOTHYROXINE SODIUM 100 UG/1
100 TABLET ORAL DAILY
Qty: 90 TABLET | Refills: 1 | Status: SHIPPED | OUTPATIENT
Start: 2024-09-30

## 2024-09-30 RX ORDER — TRIAMTERENE/HYDROCHLOROTHIAZID 37.5-25 MG
TABLET ORAL
Qty: 45 TABLET | Refills: 1 | Status: SHIPPED | OUTPATIENT
Start: 2024-09-30

## 2024-09-30 RX ORDER — ROPINIROLE 1 MG/1
1 TABLET, FILM COATED ORAL DAILY
Qty: 90 TABLET | Refills: 1 | Status: SHIPPED | OUTPATIENT
Start: 2024-09-30

## 2024-09-30 NOTE — TELEPHONE ENCOUNTER
Pt is requesting a refill on medications below:          metFORMIN (GLUCOPHAGE-XR) 500 MG extended release tablet [[id number]]     allopurinol (ZYLOPRIM) 100 MG tablet [[id number]]       rOPINIRole (REQUIP) 1 MG tablet [[id number]]     triamterene-hydroCHLOROthiazide (MAXZIDE-25) 37.5-25 MG per tablet [[id number]]       levothyroxine (SYNTHROID) 100 MCG tablet [[id number]]     [facility] - [place], [place] - [address] - P [phone] - F [phone]

## 2024-10-21 DIAGNOSIS — E11.9 TYPE 2 DIABETES MELLITUS WITHOUT COMPLICATION, WITHOUT LONG-TERM CURRENT USE OF INSULIN (HCC): ICD-10-CM

## 2024-10-21 RX ORDER — ATORVASTATIN CALCIUM 10 MG/1
10 TABLET, FILM COATED ORAL DAILY
Qty: 90 TABLET | Refills: 1 | Status: SHIPPED | OUTPATIENT
Start: 2024-10-21

## 2024-10-21 RX ORDER — SEMAGLUTIDE 2.68 MG/ML
2 INJECTION, SOLUTION SUBCUTANEOUS
Qty: 3 ML | Refills: 0 | Status: SHIPPED | OUTPATIENT
Start: 2024-10-21 | End: 2024-11-18

## 2024-10-21 NOTE — TELEPHONE ENCOUNTER
Last appointment: 9/16/2024  Next appointment: 1/16/2025  Last refill: 6/4/24  Requested Prescriptions     Pending Prescriptions Disp Refills    atorvastatin (LIPITOR) 10 MG tablet [Pharmacy Med Name: ATORVASTATIN 10MG TAB] 90 tablet 0

## 2024-10-21 NOTE — TELEPHONE ENCOUNTER
Last appointment: 9/16/2024  Next appointment: 1/16/2025  Last refill:   Requested Prescriptions     Pending Prescriptions Disp Refills    OZEMPIC, 2 MG/DOSE, 8 MG/3ML SOPN sc injection [Pharmacy Med Name: OZEMPIC 8 MG/3 ML (2 MG/DOSE)]       Sig: INJECT 2 MG INTO THE SKIN EVERY 7 DAYS FOR 28 DAYS

## 2024-11-05 ENCOUNTER — APPOINTMENT (RX ONLY)
Dept: URBAN - METROPOLITAN AREA CLINIC 170 | Facility: CLINIC | Age: 74
Setting detail: DERMATOLOGY
End: 2024-11-05

## 2024-11-05 DIAGNOSIS — Z85.820 PERSONAL HISTORY OF MALIGNANT MELANOMA OF SKIN: ICD-10-CM

## 2024-11-05 DIAGNOSIS — D22 MELANOCYTIC NEVI: ICD-10-CM | Status: STABLE

## 2024-11-05 DIAGNOSIS — D18.0 HEMANGIOMA: ICD-10-CM | Status: STABLE

## 2024-11-05 DIAGNOSIS — L82.1 OTHER SEBORRHEIC KERATOSIS: ICD-10-CM | Status: STABLE

## 2024-11-05 DIAGNOSIS — L81.4 OTHER MELANIN HYPERPIGMENTATION: ICD-10-CM | Status: STABLE

## 2024-11-05 PROBLEM — D18.01 HEMANGIOMA OF SKIN AND SUBCUTANEOUS TISSUE: Status: ACTIVE | Noted: 2024-11-05

## 2024-11-05 PROBLEM — D22.5 MELANOCYTIC NEVI OF TRUNK: Status: ACTIVE | Noted: 2024-11-05

## 2024-11-05 PROCEDURE — ? TREATMENT REGIMEN

## 2024-11-05 PROCEDURE — 99213 OFFICE O/P EST LOW 20 MIN: CPT

## 2024-11-05 PROCEDURE — ? FULL BODY SKIN EXAM

## 2024-11-05 PROCEDURE — ? COUNSELING

## 2024-11-05 ASSESSMENT — LOCATION DETAILED DESCRIPTION DERM
LOCATION DETAILED: RIGHT INFERIOR MEDIAL MIDBACK
LOCATION DETAILED: EPIGASTRIC SKIN
LOCATION DETAILED: RIGHT INFERIOR MEDIAL UPPER BACK
LOCATION DETAILED: SUBXIPHOID

## 2024-11-05 ASSESSMENT — LOCATION ZONE DERM: LOCATION ZONE: TRUNK

## 2024-11-05 ASSESSMENT — LOCATION SIMPLE DESCRIPTION DERM
LOCATION SIMPLE: ABDOMEN
LOCATION SIMPLE: RIGHT UPPER BACK
LOCATION SIMPLE: RIGHT LOWER BACK

## 2024-11-05 NOTE — HPI: EVALUATION OF SKIN LESION(S)
What Type Of Note Output Would You Prefer (Optional)?: Bullet Format
Hpi Title: Evaluation of Skin Lesions
Location: Right arm
Year Removed: 2012

## 2024-11-18 DIAGNOSIS — E11.9 TYPE 2 DIABETES MELLITUS WITHOUT COMPLICATION, WITHOUT LONG-TERM CURRENT USE OF INSULIN (HCC): ICD-10-CM

## 2024-11-18 RX ORDER — SEMAGLUTIDE 2.68 MG/ML
2 INJECTION, SOLUTION SUBCUTANEOUS
Qty: 3 ML | Refills: 3 | Status: SHIPPED | OUTPATIENT
Start: 2024-11-18 | End: 2025-03-10

## 2024-12-03 DIAGNOSIS — E11.9 TYPE 2 DIABETES MELLITUS WITHOUT COMPLICATION, WITHOUT LONG-TERM CURRENT USE OF INSULIN (HCC): ICD-10-CM

## 2024-12-03 RX ORDER — SEMAGLUTIDE 2.68 MG/ML
2 INJECTION, SOLUTION SUBCUTANEOUS
Qty: 3 ML | Refills: 3 | Status: SHIPPED | OUTPATIENT
Start: 2024-12-03 | End: 2025-03-25

## 2024-12-03 NOTE — TELEPHONE ENCOUNTER
Pt is needing the medication below to be sent to pharmacy below:        semaglutide, 2 MG/DOSE, (OZEMPIC, 2 MG/DOSE,) 8 MG/3ML SOPN sc injection [[id number]]       [facility] - [place], [place] - [address] - P [phone] - F [phone]

## 2024-12-23 DIAGNOSIS — Z00.00 MEDICARE ANNUAL WELLNESS VISIT, SUBSEQUENT: ICD-10-CM

## 2024-12-23 LAB
25(OH)D3 SERPL-MCNC: 46.3 NG/ML
ALBUMIN SERPL-MCNC: 4.1 G/DL (ref 3.4–5)
ALBUMIN/GLOB SERPL: 1.9 {RATIO} (ref 1.1–2.2)
ALP SERPL-CCNC: 74 U/L (ref 40–129)
ALT SERPL-CCNC: 21 U/L (ref 10–40)
ANION GAP SERPL CALCULATED.3IONS-SCNC: 14 MMOL/L (ref 3–16)
AST SERPL-CCNC: 23 U/L (ref 15–37)
BILIRUB SERPL-MCNC: 0.7 MG/DL (ref 0–1)
BILIRUB UR QL STRIP.AUTO: NEGATIVE
BUN SERPL-MCNC: 29 MG/DL (ref 7–20)
CALCIUM SERPL-MCNC: 10.4 MG/DL (ref 8.3–10.6)
CHLORIDE SERPL-SCNC: 102 MMOL/L (ref 99–110)
CHOLEST SERPL-MCNC: 151 MG/DL (ref 0–199)
CLARITY UR: ABNORMAL
CO2 SERPL-SCNC: 26 MMOL/L (ref 21–32)
COLOR UR: YELLOW
CREAT SERPL-MCNC: 1.1 MG/DL (ref 0.6–1.2)
CREAT UR-MCNC: 224 MG/DL (ref 28–259)
EPI CELLS #/AREA URNS HPF: ABNORMAL /HPF (ref 0–5)
GFR SERPLBLD CREATININE-BSD FMLA CKD-EPI: 53 ML/MIN/{1.73_M2}
GLUCOSE SERPL-MCNC: 104 MG/DL (ref 70–99)
GLUCOSE UR STRIP.AUTO-MCNC: NEGATIVE MG/DL
HDLC SERPL-MCNC: 52 MG/DL (ref 40–60)
HGB UR QL STRIP.AUTO: NEGATIVE
KETONES UR STRIP.AUTO-MCNC: ABNORMAL MG/DL
LDLC SERPL CALC-MCNC: 58 MG/DL
LEUKOCYTE ESTERASE UR QL STRIP.AUTO: ABNORMAL
MICROALBUMIN UR DL<=1MG/L-MCNC: 4.11 MG/DL
MICROALBUMIN/CREAT UR: 18.3 MG/G (ref 0–30)
NITRITE UR QL STRIP.AUTO: NEGATIVE
PH UR STRIP.AUTO: 5 [PH] (ref 5–8)
POTASSIUM SERPL-SCNC: 3.9 MMOL/L (ref 3.5–5.1)
PROT SERPL-MCNC: 6.3 G/DL (ref 6.4–8.2)
PROT UR STRIP.AUTO-MCNC: 30 MG/DL
RBC #/AREA URNS HPF: ABNORMAL /HPF (ref 0–4)
SODIUM SERPL-SCNC: 142 MMOL/L (ref 136–145)
SP GR UR STRIP.AUTO: 1.03 (ref 1–1.03)
TRIGL SERPL-MCNC: 206 MG/DL (ref 0–150)
TSH SERPL DL<=0.005 MIU/L-ACNC: 2.6 UIU/ML (ref 0.27–4.2)
UA DIPSTICK W REFLEX MICRO PNL UR: YES
URN SPEC COLLECT METH UR: ABNORMAL
UROBILINOGEN UR STRIP-ACNC: 0.2 E.U./DL
VLDLC SERPL CALC-MCNC: 41 MG/DL
WBC #/AREA URNS HPF: ABNORMAL /HPF (ref 0–5)

## 2024-12-24 LAB
EST. AVERAGE GLUCOSE BLD GHB EST-MCNC: 114 MG/DL
HBA1C MFR BLD: 5.6 %

## 2025-01-07 ENCOUNTER — TELEPHONE (OUTPATIENT)
Dept: INTERNAL MEDICINE CLINIC | Age: 75
End: 2025-01-07

## 2025-01-07 NOTE — TELEPHONE ENCOUNTER
Please take a look and see if there is something missing from the form? Otherwise she can call them and ask

## 2025-01-07 NOTE — TELEPHONE ENCOUNTER
Pt received a letter from [employer] stating the pt assistance application we faxed was incomplete or needed clarification.   Her letter didn't state what was still needed.   Would like to know if we can correct and resend.

## 2025-01-16 ENCOUNTER — OFFICE VISIT (OUTPATIENT)
Dept: INTERNAL MEDICINE CLINIC | Age: 75
End: 2025-01-16

## 2025-01-16 VITALS
SYSTOLIC BLOOD PRESSURE: 110 MMHG | BODY MASS INDEX: 29.35 KG/M2 | WEIGHT: 187 LBS | DIASTOLIC BLOOD PRESSURE: 76 MMHG | HEIGHT: 67 IN

## 2025-01-16 DIAGNOSIS — F11.20 OPIOID DEPENDENCE WITH CURRENT USE (HCC): ICD-10-CM

## 2025-01-16 DIAGNOSIS — E11.9 TYPE 2 DIABETES MELLITUS WITHOUT COMPLICATION, WITHOUT LONG-TERM CURRENT USE OF INSULIN (HCC): ICD-10-CM

## 2025-01-16 DIAGNOSIS — Z12.31 ENCOUNTER FOR SCREENING MAMMOGRAM FOR MALIGNANT NEOPLASM OF BREAST: ICD-10-CM

## 2025-01-16 DIAGNOSIS — Z00.00 MEDICARE ANNUAL WELLNESS VISIT, SUBSEQUENT: Primary | ICD-10-CM

## 2025-01-16 DIAGNOSIS — N18.31 STAGE 3A CHRONIC KIDNEY DISEASE (HCC): ICD-10-CM

## 2025-01-16 DIAGNOSIS — I48.0 PAROXYSMAL ATRIAL FIBRILLATION (HCC): ICD-10-CM

## 2025-01-16 SDOH — ECONOMIC STABILITY: FOOD INSECURITY: WITHIN THE PAST 12 MONTHS, THE FOOD YOU BOUGHT JUST DIDN'T LAST AND YOU DIDN'T HAVE MONEY TO GET MORE.: NEVER TRUE

## 2025-01-16 SDOH — ECONOMIC STABILITY: FOOD INSECURITY: WITHIN THE PAST 12 MONTHS, YOU WORRIED THAT YOUR FOOD WOULD RUN OUT BEFORE YOU GOT MONEY TO BUY MORE.: NEVER TRUE

## 2025-01-16 ASSESSMENT — PATIENT HEALTH QUESTIONNAIRE - PHQ9
2. FEELING DOWN, DEPRESSED OR HOPELESS: NOT AT ALL
1. LITTLE INTEREST OR PLEASURE IN DOING THINGS: SEVERAL DAYS
9. THOUGHTS THAT YOU WOULD BE BETTER OFF DEAD, OR OF HURTING YOURSELF: NOT AT ALL
SUM OF ALL RESPONSES TO PHQ QUESTIONS 1-9: 1
SUM OF ALL RESPONSES TO PHQ QUESTIONS 1-9: 1
6. FEELING BAD ABOUT YOURSELF - OR THAT YOU ARE A FAILURE OR HAVE LET YOURSELF OR YOUR FAMILY DOWN: NOT AT ALL
SUM OF ALL RESPONSES TO PHQ9 QUESTIONS 1 & 2: 1
SUM OF ALL RESPONSES TO PHQ QUESTIONS 1-9: 1
SUM OF ALL RESPONSES TO PHQ QUESTIONS 1-9: 1
10. IF YOU CHECKED OFF ANY PROBLEMS, HOW DIFFICULT HAVE THESE PROBLEMS MADE IT FOR YOU TO DO YOUR WORK, TAKE CARE OF THINGS AT HOME, OR GET ALONG WITH OTHER PEOPLE: NOT DIFFICULT AT ALL
8. MOVING OR SPEAKING SO SLOWLY THAT OTHER PEOPLE COULD HAVE NOTICED. OR THE OPPOSITE, BEING SO FIGETY OR RESTLESS THAT YOU HAVE BEEN MOVING AROUND A LOT MORE THAN USUAL: NOT AT ALL
5. POOR APPETITE OR OVEREATING: NOT AT ALL
7. TROUBLE CONCENTRATING ON THINGS, SUCH AS READING THE NEWSPAPER OR WATCHING TELEVISION: NOT AT ALL
4. FEELING TIRED OR HAVING LITTLE ENERGY: NOT AT ALL
3. TROUBLE FALLING OR STAYING ASLEEP: NOT AT ALL

## 2025-01-16 ASSESSMENT — LIFESTYLE VARIABLES: HOW OFTEN DO YOU HAVE A DRINK CONTAINING ALCOHOL: NEVER

## 2025-01-16 NOTE — ASSESSMENT & PLAN NOTE
Patient has had longer episodes on recent monitor through EP. She was advised to pursue watchman procedure as she is not a candidate for OAC. Has had SOB/dizziness lately, per EP this is likely afib.  - proceed with watchman per EP recs  - awaiting further recs for rate control to improve her sx

## 2025-01-16 NOTE — ASSESSMENT & PLAN NOTE
Tx for lumbosacral pain/radiculopathy. Insurance denied RAE. Follows with pain med.  - continue pain medicine f/u  - continue norco

## 2025-01-16 NOTE — PROGRESS NOTES
Medicare Annual Wellness Visit    Britney Del Valle is here for Medicare AWV    Assessment & Plan   Medicare annual wellness visit, subsequent  Assessment & Plan:  - dentist appt scheduled  - eye dr appt UTD  - mammogram overdue - ordered  - colonoscopy UTD- was told no further scope needed due to age  - DEXA UTD  - healthy diet and exercise discussed  - oustanding immunizations discussed  Paroxysmal atrial fibrillation (HCC)  Assessment & Plan:  Patient has had longer episodes on recent monitor through EP. She was advised to pursue watchman procedure as she is not a candidate for OAC. Has had SOB/dizziness lately, per EP this is likely afib.  - proceed with watchman per EP recs  - awaiting further recs for rate control to improve her sx  Opioid dependence with current use ([facility])  Assessment & Plan:  Tx for lumbosacral pain/radiculopathy. Insurance denied RAE. Follows with pain med.  - continue pain medicine f/u  - continue norco  Type 2 diabetes mellitus without complication, without long-term current use of insulin ([facility])  Assessment & Plan:  This problem is well-controlled on current regimen.  A1c 5.7% in June 2024.  Patient has lost 63 pounds since April 2024 on Ozempic.  - Continue Ozempic 2mg weekly  - Continue metformin  - Discussed healthy diet and exercise   -- diabetic foot exam today -- see phys exam  Orders:  -      DIABETES FOOT EXAM  Encounter for screening mammogram for malignant neoplasm of breast  -     BERNARDO DIGITAL SCREEN W OR WO CAD BILATERAL; Future  Stage 3a chronic kidney disease (HCC)  Assessment & Plan:   Worsening in kidney function on recent labs.  - Recheck BMP in 6 months.  - No NSAIDs  - Dehydrated  Orders:  -     Basic Metabolic Panel; Future     Return in about 6 months (around 7/16/2025) for follow up.     Subjective       Patient's complete Health Risk Assessment and screening values have been reviewed and are found in Flowsheets. The following problems were reviewed today and where

## 2025-01-16 NOTE — ASSESSMENT & PLAN NOTE
- dentist appt scheduled  - eye dr appt UTD  - mammogram overdue - ordered  - colonoscopy UTD- was told no further scope needed due to age  - DEXA UTD  - healthy diet and exercise discussed  - oustanding immunizations discussed

## 2025-01-16 NOTE — ASSESSMENT & PLAN NOTE
This problem is well-controlled on current regimen.  A1c 5.7% in June 2024.  Patient has lost 63 pounds since April 2024 on Ozempic.  - Continue Ozempic 2mg weekly  - Continue metformin  - Discussed healthy diet and exercise   -- diabetic foot exam today -- see phys exam

## 2025-01-16 NOTE — ASSESSMENT & PLAN NOTE
Worsening in kidney function on recent labs.  - Recheck BMP in 6 months.  - No NSAIDs  - Dehydrated

## 2025-01-16 NOTE — PATIENT INSTRUCTIONS
attack. These may include:    Chest pain or pressure, or a strange feeling in the chest.     Sweating.     Shortness of breath.     Pain, pressure, or a strange feeling in the back, neck, jaw, or upper belly or in one or both shoulders or arms.     Lightheadedness or sudden weakness.     A fast or irregular heartbeat.   After you call 911, the  may tell you to chew 1 adult-strength or 2 to 4 low-dose aspirin. Wait for an ambulance. Do not try to drive yourself.  Watch closely for changes in your health, and be sure to contact your doctor if you have any problems.  Where can you learn more?  Go to https://www.[employer].net/patientEd and enter F075 to learn more about \"A Healthy Heart: Care Instructions.\"  Current as of: July 31, 2024  Content Version: 14.3  © 2024 [employer].   Care instructions adapted under license by [employer]. If you have questions about a medical condition or this instruction, always ask your healthcare professional. [employer], [employer], disclaims any warranty or liability for your use of this information.    Personalized Preventive Plan for Britney Del Valle - 1/16/2025  Medicare offers a range of preventive health benefits. Some of the tests and screenings are paid in full while other may be subject to a deductible, co-insurance, and/or copay.  Some of these benefits include a comprehensive review of your medical history including lifestyle, illnesses that may run in your family, and various assessments and screenings as appropriate.  After reviewing your medical record and screening and assessments performed today your provider may have ordered immunizations, labs, imaging, and/or referrals for you.  A list of these orders (if applicable) as well as your Preventive Care list are included within your After Visit Summary for your review.

## 2025-02-13 DIAGNOSIS — J45.21 MILD INTERMITTENT ASTHMA WITH ACUTE EXACERBATION: ICD-10-CM

## 2025-02-13 RX ORDER — ALBUTEROL SULFATE 0.83 MG/ML
2.5 SOLUTION RESPIRATORY (INHALATION) EVERY 4 HOURS PRN
Qty: 120 EACH | Refills: 3 | Status: SHIPPED | OUTPATIENT
Start: 2025-02-13

## 2025-02-13 NOTE — TELEPHONE ENCOUNTER
REFILL      albuterol sulfate HFA (PROVENTIL;VENTOLIN;PROAIR) 108 (90 Base) MCG/ACT inhaler     [facility]/pharmacy #[id number] - [place], [place] - [address] - P [phone] - f [phone]

## 2025-02-15 PROBLEM — Z00.00 MEDICARE ANNUAL WELLNESS VISIT, SUBSEQUENT: Status: RESOLVED | Noted: 2018-01-29 | Resolved: 2025-02-15

## 2025-02-17 DIAGNOSIS — E11.9 TYPE 2 DIABETES MELLITUS WITHOUT COMPLICATION, WITHOUT LONG-TERM CURRENT USE OF INSULIN (HCC): ICD-10-CM

## 2025-02-17 RX ORDER — SEMAGLUTIDE 2.68 MG/ML
INJECTION, SOLUTION SUBCUTANEOUS
Qty: 3 ML | Refills: 3 | Status: SHIPPED | OUTPATIENT
Start: 2025-02-17

## 2025-02-17 RX ORDER — ROPINIROLE 1 MG/1
1 TABLET, FILM COATED ORAL DAILY
Qty: 90 TABLET | Refills: 1 | Status: SHIPPED | OUTPATIENT
Start: 2025-02-17

## 2025-02-17 RX ORDER — LEVOTHYROXINE SODIUM 100 UG/1
100 TABLET ORAL DAILY
Qty: 90 TABLET | Refills: 1 | Status: SHIPPED | OUTPATIENT
Start: 2025-02-17

## 2025-03-01 RX ORDER — METFORMIN HYDROCHLORIDE 500 MG/1
500 TABLET, EXTENDED RELEASE ORAL DAILY
Qty: 90 TABLET | Refills: 1 | Status: SHIPPED | OUTPATIENT
Start: 2025-03-01

## 2025-03-01 RX ORDER — TRIAMTERENE AND HYDROCHLOROTHIAZIDE 37.5; 25 MG/1; MG/1
TABLET ORAL
Qty: 45 TABLET | Refills: 1 | Status: SHIPPED | OUTPATIENT
Start: 2025-03-01

## 2025-03-24 ENCOUNTER — TELEPHONE (OUTPATIENT)
Dept: INTERNAL MEDICINE CLINIC | Age: 75
End: 2025-03-24

## 2025-03-25 NOTE — TELEPHONE ENCOUNTER
Submitted PA for Ozempic (2 MG/DOSE) 8MG/3ML pen-injectors  Via CMM Key: [id number] STATUS: Authorization already on file for this request. Authorization starting on 01/01/2025 and ending on 12/31/2025.     If this requires a response please respond to the pool ( P MHCX PSC MEDICATION PRE-AUTH).      Thank you please advise patient.

## 2025-03-26 RX ORDER — ATORVASTATIN CALCIUM 10 MG/1
10 TABLET, FILM COATED ORAL DAILY
Qty: 90 TABLET | Refills: 1 | Status: SHIPPED | OUTPATIENT
Start: 2025-03-26

## 2025-04-01 RX ORDER — ALBUTEROL SULFATE 90 UG/1
INHALANT RESPIRATORY (INHALATION)
Qty: 6.7 EACH | Refills: 2 | Status: SHIPPED | OUTPATIENT
Start: 2025-04-01

## 2025-04-01 NOTE — TELEPHONE ENCOUNTER
Pt needs refill   albuterol sulfate HFA (PROVENTIL;VENTOLIN;PROAIR) 108 (90 Base) MCG/ACT inhaler [[id number]]     [facility]/pharmacy #[id number] - [place], [place] - [address] - P [phone] - F [phone]

## 2025-04-14 ENCOUNTER — TELEPHONE (OUTPATIENT)
Dept: INTERNAL MEDICINE CLINIC | Age: 75
End: 2025-04-14

## 2025-04-14 NOTE — TELEPHONE ENCOUNTER
Pt came in and requested to change pharm to [facility] - [address] [facility] [address] - P [phone] - F [phone]

## 2025-04-21 ENCOUNTER — OFFICE VISIT (OUTPATIENT)
Dept: INTERNAL MEDICINE CLINIC | Age: 75
End: 2025-04-21

## 2025-04-21 VITALS
SYSTOLIC BLOOD PRESSURE: 128 MMHG | DIASTOLIC BLOOD PRESSURE: 70 MMHG | BODY MASS INDEX: 29.03 KG/M2 | WEIGHT: 185 LBS | HEIGHT: 67 IN | HEART RATE: 92 BPM | OXYGEN SATURATION: 97 %

## 2025-04-21 DIAGNOSIS — I48.0 PAROXYSMAL ATRIAL FIBRILLATION (HCC): ICD-10-CM

## 2025-04-21 DIAGNOSIS — E04.1 THYROID NODULE: ICD-10-CM

## 2025-04-21 DIAGNOSIS — J45.21 MILD INTERMITTENT ASTHMA WITH ACUTE EXACERBATION: ICD-10-CM

## 2025-04-21 DIAGNOSIS — E11.9 TYPE 2 DIABETES MELLITUS WITHOUT COMPLICATION, WITHOUT LONG-TERM CURRENT USE OF INSULIN (HCC): ICD-10-CM

## 2025-04-21 DIAGNOSIS — E11.9 TYPE 2 DIABETES MELLITUS WITHOUT COMPLICATION, WITHOUT LONG-TERM CURRENT USE OF INSULIN (HCC): Primary | ICD-10-CM

## 2025-04-21 PROBLEM — R82.81 PYURIA: Status: RESOLVED | Noted: 2017-09-25 | Resolved: 2025-04-21

## 2025-04-21 PROCEDURE — 83036 HEMOGLOBIN GLYCOSYLATED A1C: CPT | Performed by: STUDENT IN AN ORGANIZED HEALTH CARE EDUCATION/TRAINING PROGRAM

## 2025-04-21 PROCEDURE — 3078F DIAST BP <80 MM HG: CPT | Performed by: STUDENT IN AN ORGANIZED HEALTH CARE EDUCATION/TRAINING PROGRAM

## 2025-04-21 PROCEDURE — 99214 OFFICE O/P EST MOD 30 MIN: CPT | Performed by: STUDENT IN AN ORGANIZED HEALTH CARE EDUCATION/TRAINING PROGRAM

## 2025-04-21 PROCEDURE — 1123F ACP DISCUSS/DSCN MKR DOCD: CPT | Performed by: STUDENT IN AN ORGANIZED HEALTH CARE EDUCATION/TRAINING PROGRAM

## 2025-04-21 PROCEDURE — 3074F SYST BP LT 130 MM HG: CPT | Performed by: STUDENT IN AN ORGANIZED HEALTH CARE EDUCATION/TRAINING PROGRAM

## 2025-04-21 PROCEDURE — G2211 COMPLEX E/M VISIT ADD ON: HCPCS | Performed by: STUDENT IN AN ORGANIZED HEALTH CARE EDUCATION/TRAINING PROGRAM

## 2025-04-21 RX ORDER — ROPINIROLE 1 MG/1
1 TABLET, FILM COATED ORAL DAILY
Qty: 90 TABLET | Refills: 1 | Status: SHIPPED | OUTPATIENT
Start: 2025-04-21

## 2025-04-21 RX ORDER — TRIAMTERENE AND HYDROCHLOROTHIAZIDE 37.5; 25 MG/1; MG/1
TABLET ORAL
Qty: 45 TABLET | Refills: 1 | OUTPATIENT
Start: 2025-04-21

## 2025-04-21 RX ORDER — ALBUTEROL SULFATE 0.83 MG/ML
2.5 SOLUTION RESPIRATORY (INHALATION) EVERY 4 HOURS PRN
Qty: 120 EACH | Refills: 3 | Status: SHIPPED | OUTPATIENT
Start: 2025-04-21

## 2025-04-21 RX ORDER — ATORVASTATIN CALCIUM 10 MG/1
10 TABLET, FILM COATED ORAL DAILY
Qty: 90 TABLET | Refills: 1 | Status: SHIPPED | OUTPATIENT
Start: 2025-04-21

## 2025-04-21 RX ORDER — SEMAGLUTIDE 2.68 MG/ML
2 INJECTION, SOLUTION SUBCUTANEOUS
Qty: 3 ML | Refills: 3 | Status: SHIPPED | OUTPATIENT
Start: 2025-04-21

## 2025-04-21 RX ORDER — ALBUTEROL SULFATE 90 UG/1
INHALANT RESPIRATORY (INHALATION)
Qty: 6.7 EACH | Refills: 2 | Status: SHIPPED | OUTPATIENT
Start: 2025-04-21

## 2025-04-21 RX ORDER — LEVOTHYROXINE SODIUM 100 UG/1
100 TABLET ORAL DAILY
Qty: 90 TABLET | Refills: 1 | Status: SHIPPED | OUTPATIENT
Start: 2025-04-21

## 2025-04-21 RX ORDER — CLOPIDOGREL BISULFATE 75 MG/1
75 TABLET ORAL DAILY
COMMUNITY

## 2025-04-21 ASSESSMENT — PATIENT HEALTH QUESTIONNAIRE - PHQ9
SUM OF ALL RESPONSES TO PHQ QUESTIONS 1-9: 0
4. FEELING TIRED OR HAVING LITTLE ENERGY: NOT AT ALL
SUM OF ALL RESPONSES TO PHQ QUESTIONS 1-9: 0
1. LITTLE INTEREST OR PLEASURE IN DOING THINGS: NOT AT ALL
8. MOVING OR SPEAKING SO SLOWLY THAT OTHER PEOPLE COULD HAVE NOTICED. OR THE OPPOSITE, BEING SO FIGETY OR RESTLESS THAT YOU HAVE BEEN MOVING AROUND A LOT MORE THAN USUAL: NOT AT ALL
SUM OF ALL RESPONSES TO PHQ QUESTIONS 1-9: 0
SUM OF ALL RESPONSES TO PHQ QUESTIONS 1-9: 0
7. TROUBLE CONCENTRATING ON THINGS, SUCH AS READING THE NEWSPAPER OR WATCHING TELEVISION: NOT AT ALL
5. POOR APPETITE OR OVEREATING: NOT AT ALL
2. FEELING DOWN, DEPRESSED OR HOPELESS: NOT AT ALL
6. FEELING BAD ABOUT YOURSELF - OR THAT YOU ARE A FAILURE OR HAVE LET YOURSELF OR YOUR FAMILY DOWN: NOT AT ALL
10. IF YOU CHECKED OFF ANY PROBLEMS, HOW DIFFICULT HAVE THESE PROBLEMS MADE IT FOR YOU TO DO YOUR WORK, TAKE CARE OF THINGS AT HOME, OR GET ALONG WITH OTHER PEOPLE: NOT DIFFICULT AT ALL
3. TROUBLE FALLING OR STAYING ASLEEP: NOT AT ALL
9. THOUGHTS THAT YOU WOULD BE BETTER OFF DEAD, OR OF HURTING YOURSELF: NOT AT ALL

## 2025-04-21 NOTE — PROGRESS NOTES
2025    Britney Del Valle (:  1950) is a 74 y.o. female, here for evaluation of the following medical concerns:    Chief Complaint   Patient presents with    Diabetes        HPI    Patient had watchman procedure 25. She ended up in ICU with pericardial effusion. They took her off eliquis and put her on plavix. She returns in August to cardiologist, and they'll likely take her off plavix at that time. She has been following closely with cardiology.      Prior to Visit Medications    Medication Sig Taking? Authorizing Provider   clopidogrel (PLAVIX) 75 MG tablet Take 1 tablet by mouth daily Yes Feroz Sims MD   albuterol sulfate HFA (PROVENTIL;VENTOLIN;PROAIR) 108 (90 Base) MCG/ACT inhaler INHALE 2 PUFFS BY MOUTH EVERY 4 HOURS AS NEEDED FOR WHEEZE OR FOR SHORTNESS OF BREATH Yes Cady Braxton MD   atorvastatin (LIPITOR) 10 MG tablet TAKE 1 TABLET EVERY DAY Yes Cady Braxton MD   metFORMIN (GLUCOPHAGE-XR) 500 MG extended release tablet TAKE 1 TABLET EVERY DAY Yes Cady Braxton MD   rOPINIRole (REQUIP) 1 MG tablet TAKE 1 TABLET EVERY DAY Yes Cady Braxton MD   levothyroxine (SYNTHROID) 100 MCG tablet TAKE 1 TABLET EVERY DAY Yes Cady Braxton MD   OZEMPIC, 2 MG/DOSE, 8 MG/3ML SOPN sc injection INJECT 2MG UNDER THE SKIN ONE TIME WEEKLY Yes Cady Braxton MD   albuterol (PROVENTIL) (2.5 MG/3ML) 0.083% nebulizer solution Take 3 mLs by nebulization every 4 hours as needed for Wheezing or Shortness of Breath Yes Cady Braxton MD   allopurinol (ZYLOPRIM) 100 MG tablet Take 1 tablet by mouth daily Yes Cady Braxton MD   Cholecalciferol (VITAMIN D3) 50 MCG ( UT) CAPS Take by mouth daily Yes Feroz Sims MD   Multiple Vitamins-Minerals (THERAPEUTIC MULTIVITAMIN-MINERALS) tablet Take 1 tablet by mouth daily Yes Feroz Sims MD   ketoconazole (NIZORAL) 2 % cream Apply topically daily. Yes Ward Gaona MD        Allergies   Allergen Reactions    Warfarin And

## 2025-04-21 NOTE — ASSESSMENT & PLAN NOTE
This problem is well-controlled on current regimen.  Patient has lost 63 pounds since April 2024 on Ozempic.   - Continue Ozempic 2mg weekly  - Continue metformin  - Discussed healthy diet and exercise   - A1c today 4.9%

## 2025-04-21 NOTE — TELEPHONE ENCOUNTER
Pts pharmacy is requesting a refill on medications below for the pt:      albuterol sulfate HFA (PROVENTIL;VENTOLIN;PROAIR) 108 (90 Base) MCG/ACT inhaler [[id number]]     albuterol (PROVENTIL) (2.5 MG/3ML) 0.083% nebulizer solution [[id number]]       levothyroxine (SYNTHROID) 100 MCG tablet [[id number]]     rOPINIRole (REQUIP) 1 MG tablet [[id number]]     atorvastatin (LIPITOR) 10 MG tablet [[id number]]     triamterene-hydroCHLOROthiazide (MAXZIDE-25) 37.5-25 MG per tablet [[id number]       metFORMIN (GLUCOPHAGE-XR) 500 MG extended release tablet [[id number]]       OZEMPIC, 2 MG/DOSE, 8 MG/3ML SOPN sc injection [[id number]]       HYDROcodone-acetaminophen (NORCO) 5-325 MG per tablet [[id number]]  ENDED     [facility] - [address] [facility] [address] - P [phone] - F [phone]

## 2025-04-21 NOTE — ASSESSMENT & PLAN NOTE
Patient has had longer episodes on recent monitor through EP. She was advised to pursue watchman procedure as she is not a candidate for OAC. Has had SOB/dizziness lately, per EP this is likely afib. Underwent watchman Feb 2025 c/b pericardial effusion.  - continue plavix  - continue EP f/u

## 2025-04-22 ENCOUNTER — RESULTS FOLLOW-UP (OUTPATIENT)
Dept: INTERNAL MEDICINE CLINIC | Age: 75
End: 2025-04-22

## 2025-04-22 LAB
CREAT UR-MCNC: 137 MG/DL (ref 28–259)
MICROALBUMIN UR DL<=1MG/L-MCNC: <1.2 MG/DL
MICROALBUMIN/CREAT UR: NORMAL MG/G (ref 0–30)

## 2025-04-25 ENCOUNTER — TELEPHONE (OUTPATIENT)
Dept: INTERNAL MEDICINE CLINIC | Age: 75
End: 2025-04-25

## 2025-04-25 NOTE — TELEPHONE ENCOUNTER
Jecil from [facility] needs refill below    [phone] (Jecil)  [facility]        triamterene-hydroCHLOROthiazide (MAXZIDE-25) 37.5-25 MG per tablet [[id number]]  DISCONTINUED       HYDROcodone-acetaminophen (NORCO) 5-325 MG per tablet [[id number]]  ENDED     [facility] - [address] [facility] [address] - P [phone] - F [phone]

## 2025-04-27 NOTE — TELEPHONE ENCOUNTER
Ok, we can ask patient if she is taking the triamterene hctz? And I don't rx norco. These may just be old Rx from automatic refill

## 2025-05-16 LAB — MAMMOGRAPHY, EXTERNAL: NORMAL

## 2025-05-22 ENCOUNTER — TELEPHONE (OUTPATIENT)
Dept: INTERNAL MEDICINE CLINIC | Age: 75
End: 2025-05-22

## 2025-05-22 NOTE — TELEPHONE ENCOUNTER
Ruthann from [facility] called to leave her contact information incase we needed care coordination for the pt. She also stated she is updating the pts care plan and it will be available on the [facility] Provider portal.    Ruthann - [phone]

## 2025-05-27 ENCOUNTER — TELEPHONE (OUTPATIENT)
Dept: INTERNAL MEDICINE CLINIC | Age: 75
End: 2025-05-27

## 2025-05-27 NOTE — TELEPHONE ENCOUNTER
Pt states Alida Erickson was suppose to send over a fax regarding medication coverage. Pt is checking to see  if she received this.  Please call and advise  [phone]

## 2025-06-25 ENCOUNTER — TELEPHONE (OUTPATIENT)
Dept: INTERNAL MEDICINE CLINIC | Age: 75
End: 2025-06-25

## 2025-06-25 NOTE — TELEPHONE ENCOUNTER
Pt is calling to see if her Ozempic has arrived so she can pick medication today    [phone]  Pls call and advise

## 2025-06-30 RX ORDER — METFORMIN HYDROCHLORIDE 500 MG/1
500 TABLET, EXTENDED RELEASE ORAL DAILY
Qty: 90 TABLET | Refills: 1 | Status: SHIPPED | OUTPATIENT
Start: 2025-06-30

## 2025-06-30 NOTE — TELEPHONE ENCOUNTER
Pts pharmacy is requesting a refill on medication below for the pt:      metFORMIN (GLUCOPHAGE-XR) 500 MG extended release tablet [[id number]]     [facility] - [address] [facility] [address] - P [phone] - F [phone]

## 2025-07-16 ENCOUNTER — OFFICE VISIT (OUTPATIENT)
Dept: FAMILY MEDICINE CLINIC | Age: 75
End: 2025-07-16
Payer: COMMERCIAL

## 2025-07-16 VITALS
TEMPERATURE: 96.8 F | DIASTOLIC BLOOD PRESSURE: 62 MMHG | BODY MASS INDEX: 28.88 KG/M2 | SYSTOLIC BLOOD PRESSURE: 118 MMHG | HEART RATE: 86 BPM | OXYGEN SATURATION: 95 % | WEIGHT: 184 LBS | HEIGHT: 67 IN

## 2025-07-16 DIAGNOSIS — Z86.718 HISTORY OF DVT (DEEP VEIN THROMBOSIS): ICD-10-CM

## 2025-07-16 DIAGNOSIS — M46.1 SACROILIITIS, NOT ELSEWHERE CLASSIFIED: ICD-10-CM

## 2025-07-16 DIAGNOSIS — I95.1 ORTHOSTATIC HYPOTENSION: ICD-10-CM

## 2025-07-16 DIAGNOSIS — F11.20 OPIOID DEPENDENCE WITH CURRENT USE (HCC): ICD-10-CM

## 2025-07-16 DIAGNOSIS — E11.9 TYPE 2 DIABETES MELLITUS WITHOUT COMPLICATION, UNSPECIFIED WHETHER LONG TERM INSULIN USE (HCC): Primary | ICD-10-CM

## 2025-07-16 DIAGNOSIS — G25.81 RESTLESS LEG SYNDROME: ICD-10-CM

## 2025-07-16 DIAGNOSIS — E03.9 HYPOTHYROIDISM, UNSPECIFIED TYPE: ICD-10-CM

## 2025-07-16 DIAGNOSIS — I48.0 PAROXYSMAL ATRIAL FIBRILLATION (HCC): ICD-10-CM

## 2025-07-16 DIAGNOSIS — Z95.0 PACEMAKER: ICD-10-CM

## 2025-07-16 DIAGNOSIS — N18.31 STAGE 3A CHRONIC KIDNEY DISEASE (HCC): ICD-10-CM

## 2025-07-16 DIAGNOSIS — M48.061 SPINAL STENOSIS OF LUMBAR REGION, UNSPECIFIED WHETHER NEUROGENIC CLAUDICATION PRESENT: Chronic | ICD-10-CM

## 2025-07-16 DIAGNOSIS — E78.2 HYPERLIPIDEMIA, MIXED: ICD-10-CM

## 2025-07-16 DIAGNOSIS — M54.16 LUMBAR RADICULOPATHY: ICD-10-CM

## 2025-07-16 DIAGNOSIS — Z78.0 POST-MENOPAUSAL: ICD-10-CM

## 2025-07-16 PROBLEM — N28.9 RENAL INSUFFICIENCY: Status: RESOLVED | Noted: 2017-09-25 | Resolved: 2025-07-16

## 2025-07-16 PROBLEM — K90.49 MALABSORPTION DUE TO INTOLERANCE, NOT ELSEWHERE CLASSIFIED: Status: RESOLVED | Noted: 2022-09-14 | Resolved: 2025-07-16

## 2025-07-16 PROBLEM — F32.4 MAJOR DEPRESSIVE DISORDER, SINGLE EPISODE, IN PARTIAL REMISSION: Status: RESOLVED | Noted: 2022-04-08 | Resolved: 2025-07-16

## 2025-07-16 PROBLEM — I49.5 SSS (SICK SINUS SYNDROME) (HCC): Status: RESOLVED | Noted: 2024-09-16 | Resolved: 2025-07-16

## 2025-07-16 LAB — HBA1C MFR BLD: 5.3 %

## 2025-07-16 PROCEDURE — 83036 HEMOGLOBIN GLYCOSYLATED A1C: CPT | Performed by: NURSE PRACTITIONER

## 2025-07-16 PROCEDURE — 1123F ACP DISCUSS/DSCN MKR DOCD: CPT | Performed by: NURSE PRACTITIONER

## 2025-07-16 PROCEDURE — 99214 OFFICE O/P EST MOD 30 MIN: CPT | Performed by: NURSE PRACTITIONER

## 2025-07-16 PROCEDURE — G2211 COMPLEX E/M VISIT ADD ON: HCPCS | Performed by: NURSE PRACTITIONER

## 2025-07-16 PROCEDURE — 1159F MED LIST DOCD IN RCRD: CPT | Performed by: NURSE PRACTITIONER

## 2025-07-16 PROCEDURE — 3044F HG A1C LEVEL LT 7.0%: CPT | Performed by: NURSE PRACTITIONER

## 2025-07-16 NOTE — PROGRESS NOTES
Britney Del Valle (:  1950) is a 74 y.o. female,Established patient, here for evaluation of the following chief complaint(s):  Follow-up (Diabetes follow up )  This patient is under the primary care of Dr. Braxton who is currently on leave.  I will assume and provide all primary care of this patient until her return to practice at which time all care will be returned to Dr. Braxton. Until that time patient has been made aware that I can provider her care as needed and they agree to this plan.      ASSESSMENT/PLAN:  Assessment & Plan  1. Type 2 Diabetes Mellitus.  - Last A1c was 5.3, indicating good control.  - Currently on metformin 500 mg daily and Ozempic 2 mg once a week.  - Continue current medication regimen.    2. Stage III Chronic Kidney Disease.  - Last kidney function test in 2024 showed a slight decrease in kidney function, expected with age.  - No significant changes noted.  - Recheck of kidney function will be ordered.    3. Sick Sinus Syndrome.  - Pacemaker sends reports; no odd feelings reported.  - No further action required at this time.  - Continue monitoring through pacemaker reports.    4. Atrial Fibrillation.  - Currently on Plavix due to history of intestinal bleeding, preventing use of other blood thinners.  - Follow-up with cardiology in 2025 to discuss discontinuing blood thinners.  - Continue current medication regimen.    5. Spinal Stenosis and Sacroiliitis.  - Takes Norco for back pain.  - Continue current pain management regimen.  - Monitor pain levels and medication effectiveness.    6. Restless Leg Syndrome.  - Takes Requip (ropinirole) and reports it works well.  - Continue current medication.  - Monitor for any changes in symptoms.    7. Postmenopausal Status.  - Last DEXA scan in 2024 showed normal bone density.  - Continue taking vitamin D and calcium supplements.  - No further action required at this time.    8. Sleep Apnea.  - Does not currently use a CPAP machine;

## 2025-07-22 RX ORDER — METFORMIN HYDROCHLORIDE 500 MG/1
500 TABLET, EXTENDED RELEASE ORAL DAILY
Qty: 90 TABLET | Refills: 1 | Status: SHIPPED | OUTPATIENT
Start: 2025-07-22

## 2025-07-22 NOTE — TELEPHONE ENCOUNTER
Pharm calling pt needs refill     metFORMIN (GLUCOPHAGE-XR) 500 MG extended release tablet [[id number]]   [facility] - [address] [facility] [address] - P [phone] - F [phone] [address], [place] [address]  Phone: [phone]  Fax: [phone]

## 2025-07-24 ENCOUNTER — HOSPITAL ENCOUNTER (OUTPATIENT)
Dept: GENERAL RADIOLOGY | Age: 75
Discharge: HOME | End: 2025-07-24
Payer: COMMERCIAL

## 2025-07-24 DIAGNOSIS — E03.9 HYPOTHYROIDISM, UNSPECIFIED TYPE: ICD-10-CM

## 2025-07-24 DIAGNOSIS — I48.0 PAROXYSMAL ATRIAL FIBRILLATION (HCC): ICD-10-CM

## 2025-07-24 DIAGNOSIS — E78.2 HYPERLIPIDEMIA, MIXED: ICD-10-CM

## 2025-07-24 DIAGNOSIS — N18.31 STAGE 3A CHRONIC KIDNEY DISEASE (HCC): ICD-10-CM

## 2025-07-24 DIAGNOSIS — M96.1 POSTLAMINECTOMY SYNDROME: ICD-10-CM

## 2025-07-24 DIAGNOSIS — E11.9 TYPE 2 DIABETES MELLITUS WITHOUT COMPLICATION, UNSPECIFIED WHETHER LONG TERM INSULIN USE (HCC): ICD-10-CM

## 2025-07-24 LAB
ALBUMIN SERPL-MCNC: 3.8 G/DL (ref 3.4–5)
ALBUMIN/GLOB SERPL: 2 {RATIO} (ref 1.1–2.2)
ALP SERPL-CCNC: 75 U/L (ref 40–129)
ALT SERPL-CCNC: 18 U/L (ref 10–40)
ANION GAP SERPL CALCULATED.3IONS-SCNC: 11 MMOL/L (ref 3–16)
AST SERPL-CCNC: 20 U/L (ref 15–37)
BASOPHILS # BLD: 0.1 K/UL (ref 0–0.2)
BASOPHILS NFR BLD: 0.9 %
BILIRUB SERPL-MCNC: 0.6 MG/DL (ref 0–1)
BUN SERPL-MCNC: 21 MG/DL (ref 7–20)
CALCIUM SERPL-MCNC: 9.8 MG/DL (ref 8.3–10.6)
CHLORIDE SERPL-SCNC: 103 MMOL/L (ref 99–110)
CO2 SERPL-SCNC: 26 MMOL/L (ref 21–32)
CREAT SERPL-MCNC: 0.9 MG/DL (ref 0.6–1.2)
DEPRECATED RDW RBC AUTO: 14.7 % (ref 12.4–15.4)
EOSINOPHIL # BLD: 0.2 K/UL (ref 0–0.6)
EOSINOPHIL NFR BLD: 3.7 %
GFR SERPLBLD CREATININE-BSD FMLA CKD-EPI: 67 ML/MIN/{1.73_M2}
GLUCOSE SERPL-MCNC: 84 MG/DL (ref 70–99)
HCT VFR BLD AUTO: 38.8 % (ref 36–48)
HGB BLD-MCNC: 13.2 G/DL (ref 12–16)
LYMPHOCYTES # BLD: 1.6 K/UL (ref 1–5.1)
LYMPHOCYTES NFR BLD: 25.2 %
MCH RBC QN AUTO: 32.1 PG (ref 26–34)
MCHC RBC AUTO-ENTMCNC: 34.1 G/DL (ref 31–36)
MCV RBC AUTO: 94.4 FL (ref 80–100)
MONOCYTES # BLD: 0.5 K/UL (ref 0–1.3)
MONOCYTES NFR BLD: 8.1 %
NEUTROPHILS # BLD: 3.9 K/UL (ref 1.7–7.7)
NEUTROPHILS NFR BLD: 62.1 %
PLATELET # BLD AUTO: 274 K/UL (ref 135–450)
PMV BLD AUTO: 8.7 FL (ref 5–10.5)
POTASSIUM SERPL-SCNC: 4.1 MMOL/L (ref 3.5–5.1)
PROT SERPL-MCNC: 5.7 G/DL (ref 6.4–8.2)
RBC # BLD AUTO: 4.11 M/UL (ref 4–5.2)
SODIUM SERPL-SCNC: 140 MMOL/L (ref 136–145)
TSH SERPL DL<=0.005 MIU/L-ACNC: 3.3 UIU/ML (ref 0.27–4.2)
WBC # BLD AUTO: 6.3 K/UL (ref 4–11)

## 2025-07-24 PROCEDURE — 72040 X-RAY EXAM NECK SPINE 2-3 VW: CPT

## (undated) DEVICE — ALCOHOL RUBBING 16OZ 70% ISO

## (undated) DEVICE — Z DISCONTINUED USE 2432103 SOLUTION PREP PVP-I W/ APPL URETHANE COT TIP SPNG

## (undated) DEVICE — GAUZE,SPONGE,4"X4",16PLY,STRL,LF,10/TRAY: Brand: MEDLINE

## (undated) DEVICE — SOLUTION SCRB 3% CHLOROXYLENOL BLU ANTIMIC SKIN CLN

## (undated) DEVICE — APPLICATOR PREP 26ML 0.7% IOD POVACRYLEX 74% ISO ALC ST

## (undated) DEVICE — ENDOSCOPY KIT: Brand: MEDLINE INDUSTRIES, INC.

## (undated) DEVICE — GAUZE SPONGE 8PLY LF ST 4X4IN 2/PK

## (undated) DEVICE — STERILE POLYISOPRENE POWDER-FREE SURGICAL GLOVES: Brand: PROTEXIS

## (undated) DEVICE — UNIVERSAL BLOCK TRAY: Brand: MEDLINE INDUSTRIES, INC.

## (undated) DEVICE — AVANOS* UNIVERSAL BLOCK TRAYS: Brand: AVANOS

## (undated) DEVICE — TOWEL OR BLUEE 16X26IN ST 8 PACK ORB08 16X26ORTWL

## (undated) DEVICE — Z DISCONTINUED (SUB = MFG CAT 4422) SPONGE GZ 4X4 IN 8 PLY NS

## (undated) DEVICE — BITE BLOCK ENDOSCP AD 60 FR W/ ADJ STRP PLAS GRN BLOX